# Patient Record
Sex: FEMALE | Race: WHITE | NOT HISPANIC OR LATINO | Employment: OTHER | URBAN - METROPOLITAN AREA
[De-identification: names, ages, dates, MRNs, and addresses within clinical notes are randomized per-mention and may not be internally consistent; named-entity substitution may affect disease eponyms.]

---

## 2023-08-14 ENCOUNTER — HOSPITAL ENCOUNTER (OUTPATIENT)
Dept: DATA CONVERSION | Facility: HOSPITAL | Age: 73
Discharge: HOME | End: 2023-08-14

## 2023-08-14 DIAGNOSIS — K76.9 LIVER DISEASE, UNSPECIFIED: ICD-10-CM

## 2023-09-08 PROBLEM — D75.89 MACROCYTOSIS WITHOUT ANEMIA: Status: ACTIVE | Noted: 2023-09-08

## 2023-09-08 PROBLEM — R00.2 PALPITATIONS: Status: ACTIVE | Noted: 2023-09-08

## 2023-09-08 PROBLEM — R74.8 ELEVATED LIVER ENZYMES: Status: ACTIVE | Noted: 2023-09-08

## 2023-09-08 PROBLEM — J44.9 CHRONIC OBSTRUCTIVE PULMONARY DISEASE (MULTI): Status: ACTIVE | Noted: 2023-09-08

## 2023-09-08 PROBLEM — N76.1 SUBACUTE AND CHRONIC VAGINITIS: Status: ACTIVE | Noted: 2023-09-08

## 2023-09-08 PROBLEM — R06.02 SHORTNESS OF BREATH: Status: ACTIVE | Noted: 2023-09-08

## 2023-09-08 PROBLEM — E78.5 HYPERLIPIDEMIA: Status: ACTIVE | Noted: 2023-09-08

## 2023-09-08 PROBLEM — I20.9 ANGINA PECTORIS (CMS-HCC): Status: ACTIVE | Noted: 2023-09-08

## 2023-09-08 RX ORDER — PRAVASTATIN SODIUM 80 MG/1
1 TABLET ORAL DAILY
COMMUNITY
Start: 2021-10-19 | End: 2023-12-26 | Stop reason: ALTCHOICE

## 2023-09-08 RX ORDER — POLYMYXIN B SULFATE AND TRIMETHOPRIM 1; 10000 MG/ML; [USP'U]/ML
SOLUTION OPHTHALMIC EVERY 6 HOURS
COMMUNITY
Start: 2022-12-02 | End: 2023-12-26 | Stop reason: ALTCHOICE

## 2023-09-08 RX ORDER — METOPROLOL SUCCINATE 50 MG/1
1 TABLET, EXTENDED RELEASE ORAL DAILY
COMMUNITY
Start: 2020-12-02 | End: 2024-01-23 | Stop reason: SDUPTHER

## 2023-09-08 RX ORDER — AMLODIPINE BESYLATE 5 MG/1
1 TABLET ORAL DAILY
COMMUNITY
End: 2024-02-09 | Stop reason: DRUGHIGH

## 2023-09-08 RX ORDER — ASPIRIN 81 MG/1
1 TABLET ORAL DAILY
COMMUNITY

## 2023-09-08 RX ORDER — CLOPIDOGREL BISULFATE 75 MG/1
1 TABLET ORAL DAILY
COMMUNITY
Start: 2022-08-30 | End: 2023-12-26 | Stop reason: ALTCHOICE

## 2023-09-08 RX ORDER — UMECLIDINIUM 62.5 UG/1
1 AEROSOL, POWDER ORAL DAILY
COMMUNITY
End: 2023-12-26 | Stop reason: ALTCHOICE

## 2023-09-08 RX ORDER — ISOSORBIDE DINITRATE 10 MG/1
1 TABLET ORAL 2 TIMES DAILY
COMMUNITY
End: 2023-12-26 | Stop reason: ALTCHOICE

## 2023-09-08 RX ORDER — ATORVASTATIN CALCIUM 40 MG/1
1 TABLET, FILM COATED ORAL DAILY
COMMUNITY
Start: 2023-02-02 | End: 2024-05-01 | Stop reason: SDUPTHER

## 2023-09-08 RX ORDER — ISOSORBIDE MONONITRATE 60 MG/1
TABLET, EXTENDED RELEASE ORAL
COMMUNITY
Start: 2020-12-17 | End: 2023-12-26 | Stop reason: ALTCHOICE

## 2023-09-08 RX ORDER — HYDROCHLOROTHIAZIDE 25 MG/1
1 TABLET ORAL EVERY MORNING
COMMUNITY
End: 2023-12-26 | Stop reason: ALTCHOICE

## 2023-09-15 ENCOUNTER — HOSPITAL ENCOUNTER (OUTPATIENT)
Dept: DATA CONVERSION | Facility: HOSPITAL | Age: 73
End: 2023-09-15

## 2023-09-15 ENCOUNTER — HOSPITAL ENCOUNTER (OUTPATIENT)
Dept: DATA CONVERSION | Facility: HOSPITAL | Age: 73
Discharge: HOME | End: 2023-09-15
Payer: COMMERCIAL

## 2023-09-15 DIAGNOSIS — F41.9 ANXIETY DISORDER, UNSPECIFIED: ICD-10-CM

## 2023-09-22 ENCOUNTER — HOSPITAL ENCOUNTER (OUTPATIENT)
Dept: DATA CONVERSION | Facility: HOSPITAL | Age: 73
Discharge: HOME | End: 2023-09-22
Payer: COMMERCIAL

## 2023-09-22 DIAGNOSIS — R16.0 HEPATOMEGALY, NOT ELSEWHERE CLASSIFIED: ICD-10-CM

## 2023-09-22 LAB
ALBUMIN SERPL-MCNC: 4.3 GM/DL (ref 3.5–5)
ALBUMIN/GLOB SERPL: 1.6 RATIO (ref 1.5–3)
ALP BLD-CCNC: 60 U/L (ref 35–125)
ALT SERPL-CCNC: 23 U/L (ref 5–40)
ANTICOAGULANT: NORMAL
AST SERPL-CCNC: 26 U/L (ref 5–40)
BILIRUB DIRECT SERPL-MCNC: 0.2 MG/DL (ref 0–0.2)
BILIRUB INDIRECT SERPL-MCNC: 0.3 MG/DL (ref 0–0.8)
BILIRUB SERPL-MCNC: 0.5 MG/DL (ref 0.1–1.2)
GLOBULIN SER-MCNC: 2.7 G/DL (ref 1.9–3.7)
INR PPP: 1 (ref 0.86–1.16)
PROT SERPL-MCNC: 7 G/DL (ref 5.9–7.9)
PROTHROMBIN TIME: 10.3 SEC (ref 9.3–12.7)

## 2023-09-23 LAB — AFP SERPL-MCNC: NORMAL NG/ML

## 2023-10-17 ENCOUNTER — APPOINTMENT (OUTPATIENT)
Dept: PRIMARY CARE | Facility: CLINIC | Age: 73
End: 2023-10-17
Payer: MEDICARE

## 2023-12-12 ENCOUNTER — TELEPHONE (OUTPATIENT)
Dept: PRIMARY CARE | Facility: CLINIC | Age: 73
End: 2023-12-12
Payer: COMMERCIAL

## 2023-12-12 DIAGNOSIS — J40 BRONCHITIS: Primary | ICD-10-CM

## 2023-12-12 RX ORDER — AZITHROMYCIN 250 MG/1
TABLET, FILM COATED ORAL
Qty: 6 TABLET | Refills: 0 | Status: SHIPPED | OUTPATIENT
Start: 2023-12-12 | End: 2023-12-17

## 2023-12-12 RX ORDER — BENZONATATE 100 MG/1
100 CAPSULE ORAL 3 TIMES DAILY PRN
Qty: 42 CAPSULE | Refills: 0 | Status: SHIPPED | OUTPATIENT
Start: 2023-12-12 | End: 2024-01-11

## 2023-12-12 NOTE — TELEPHONE ENCOUNTER
PATIENT CALLED C/O MOIST COUGH X 1 WEEK. HAS TREATED WITH ROBITUSSIN NOT HELPING. COVID NEGATIVE, NO FEVER. REQUESTING SOMETHING FOR COUGH

## 2023-12-18 ENCOUNTER — TELEPHONE (OUTPATIENT)
Dept: PRIMARY CARE | Facility: CLINIC | Age: 73
End: 2023-12-18
Payer: COMMERCIAL

## 2023-12-18 NOTE — TELEPHONE ENCOUNTER
"Patient states that tessalon perles have not helped. Requesting \"something different\". Please advise.  "

## 2023-12-26 ENCOUNTER — OFFICE VISIT (OUTPATIENT)
Dept: PRIMARY CARE | Facility: CLINIC | Age: 73
End: 2023-12-26
Payer: MEDICARE

## 2023-12-26 VITALS
SYSTOLIC BLOOD PRESSURE: 121 MMHG | BODY MASS INDEX: 32.33 KG/M2 | HEIGHT: 67 IN | HEART RATE: 82 BPM | OXYGEN SATURATION: 93 % | TEMPERATURE: 97.8 F | WEIGHT: 206 LBS | DIASTOLIC BLOOD PRESSURE: 73 MMHG

## 2023-12-26 DIAGNOSIS — J40 BRONCHITIS: Primary | ICD-10-CM

## 2023-12-26 PROBLEM — F41.9 ANXIETY: Status: ACTIVE | Noted: 2023-12-26

## 2023-12-26 PROBLEM — K76.9 LESION OF LIVER: Status: ACTIVE | Noted: 2023-12-26

## 2023-12-26 PROCEDURE — 1036F TOBACCO NON-USER: CPT | Performed by: LICENSED PRACTICAL NURSE

## 2023-12-26 PROCEDURE — 99213 OFFICE O/P EST LOW 20 MIN: CPT | Performed by: LICENSED PRACTICAL NURSE

## 2023-12-26 PROCEDURE — 1126F AMNT PAIN NOTED NONE PRSNT: CPT | Performed by: LICENSED PRACTICAL NURSE

## 2023-12-26 PROCEDURE — 1159F MED LIST DOCD IN RCRD: CPT | Performed by: LICENSED PRACTICAL NURSE

## 2023-12-26 PROCEDURE — 1160F RVW MEDS BY RX/DR IN RCRD: CPT | Performed by: LICENSED PRACTICAL NURSE

## 2023-12-26 RX ORDER — UMECLIDINIUM 62.5 UG/1
AEROSOL, POWDER ORAL
COMMUNITY

## 2023-12-26 RX ORDER — DOXYCYCLINE 100 MG/1
100 CAPSULE ORAL 2 TIMES DAILY
Qty: 14 CAPSULE | Refills: 0 | Status: SHIPPED | OUTPATIENT
Start: 2023-12-26 | End: 2024-01-02

## 2023-12-26 RX ORDER — METHYLPREDNISOLONE 4 MG/1
TABLET ORAL
Qty: 21 TABLET | Refills: 0 | Status: SHIPPED | OUTPATIENT
Start: 2023-12-26 | End: 2024-01-02

## 2023-12-26 RX ORDER — ALBUTEROL SULFATE 90 UG/1
2 AEROSOL, METERED RESPIRATORY (INHALATION) EVERY 6 HOURS PRN
COMMUNITY

## 2023-12-26 ASSESSMENT — ENCOUNTER SYMPTOMS
OCCASIONAL FEELINGS OF UNSTEADINESS: 0
COUGH: 1
LOSS OF SENSATION IN FEET: 0
SORE THROAT: 0
RHINORRHEA: 0
SINUS PRESSURE: 0
DEPRESSION: 0
SHORTNESS OF BREATH: 1

## 2023-12-26 ASSESSMENT — PAIN SCALES - GENERAL: PAINLEVEL: 0-NO PAIN

## 2023-12-26 ASSESSMENT — PATIENT HEALTH QUESTIONNAIRE - PHQ9
SUM OF ALL RESPONSES TO PHQ9 QUESTIONS 1 AND 2: 0
2. FEELING DOWN, DEPRESSED OR HOPELESS: NOT AT ALL
1. LITTLE INTEREST OR PLEASURE IN DOING THINGS: NOT AT ALL

## 2023-12-26 NOTE — PATIENT INSTRUCTIONS
It was nice meeting you today Mrs. Castellano. I'm sorry you have having difficulty with this cough. I will prescribe you a different antibiotic and a steroid that should help you some. If you notice your symptoms are not improving or worsening please contact your pulmonologist. If you feel sever shortness call 911.

## 2023-12-26 NOTE — PROGRESS NOTES
[facility]: [facility]  PROGRESS NOTE      Elsa Castellano is a 73 y.o. female that is presenting today for Cough (X 2 weeks).      Subjective   Pt presents to the office today for concerns of a cough that has not seemed to go away. She has had the cough for approximately 1 month. She reports a productive cough with clear mucous. She reports the cough is not worsening, but continues to affect her breathing. She tried a Zpak and tessalon perles last week and noticed no improvement in her symptoms. She denies nasal congestion, ear concerns, fever, chills.     Cough  Associated symptoms include shortness of breath. Pertinent negatives include no postnasal drip, rhinorrhea or sore throat.     Review of Systems   HENT:  Negative for postnasal drip, rhinorrhea, sinus pressure and sore throat.    Respiratory:  Positive for cough and shortness of breath.       Objective   Vitals:    12/26/23 0922   BP: 121/73   Pulse: 82   Temp: 36.6 °C (97.8 °F)   SpO2: 93%      Body mass index is 32.02 kg/m².  Physical Exam  Constitutional:       General: She is not in acute distress.     Appearance: She is not ill-appearing or toxic-appearing.   Cardiovascular:      Rate and Rhythm: Normal rate and regular rhythm.   Pulmonary:      Effort: Pulmonary effort is normal.      Breath sounds: Examination of the right-upper field reveals wheezing. Examination of the left-upper field reveals wheezing. Examination of the right-middle field reveals wheezing. Examination of the left-middle field reveals wheezing. Wheezing present.       Diagnostic Results   Lab Results   Component Value Date    GLUCOSE 100 (H) 08/05/2023    CALCIUM 9.0 08/05/2023     08/05/2023    K 4.8 08/05/2023    CO2 20 (L) 08/05/2023     (H) 08/05/2023    BUN 16 08/05/2023    CREATININE 0.8 08/05/2023     Lab Results   Component Value Date    ALT 23 09/22/2023    AST 26 09/22/2023    ALKPHOS 60 09/22/2023    BILITOT 0.5 09/22/2023     Lab  "Results   Component Value Date    WBC 6.7 08/05/2023    HGB 13.8 08/05/2023    HCT 43.4 08/05/2023    MCV 98.2 08/05/2023     08/05/2023     Lab Results   Component Value Date    CHOL 158 08/05/2023    CHOL 216 (H) 01/27/2023    CHOL 200 07/27/2022     Lab Results   Component Value Date    HDL 57 08/05/2023    HDL 60 01/27/2023    HDL 62 07/27/2022     Lab Results   Component Value Date    LDLCALC 79 08/05/2023    LDLCALC 120 01/27/2023    LDLCALC 101 07/27/2022     Lab Results   Component Value Date    TRIG 112 08/05/2023    TRIG 181 (H) 01/27/2023    TRIG 186 (H) 07/27/2022     No components found for: \"CHOLHDL\"  Lab Results   Component Value Date    HGBA1C 5.6 01/27/2023     Other labs not included in the list above were reviewed either before or during this encounter.    History    Past Medical History:   Diagnosis Date    Personal history of other diseases of the circulatory system     History of hypertension    Personal history of other diseases of the respiratory system     History of chronic obstructive lung disease     History reviewed. No pertinent surgical history.  Family History   Problem Relation Name Age of Onset    Stroke Father      No Known Problems Brother       Social History     Socioeconomic History    Marital status:      Spouse name: Not on file    Number of children: Not on file    Years of education: Not on file    Highest education level: Not on file   Occupational History    Not on file   Tobacco Use    Smoking status: Former     Types: Cigarettes     Passive exposure: Past    Smokeless tobacco: Never   Vaping Use    Vaping Use: Never used   Substance and Sexual Activity    Alcohol use: Yes    Drug use: Never    Sexual activity: Not on file   Other Topics Concern    Not on file   Social History Narrative    Not on file     Social Determinants of Health     Financial Resource Strain: Not on file   Food Insecurity: Not on file   Transportation Needs: Not on file   Physical " Activity: Not on file   Stress: Not on file   Social Connections: Not on file   Intimate Partner Violence: Not on file   Housing Stability: Not on file     Allergies   Allergen Reactions    Hydrochlorothiazide Dizziness    Penicillins Unknown     Current Outpatient Medications on File Prior to Visit   Medication Sig Dispense Refill    albuterol (Proventil HFA) 90 mcg/actuation inhaler Inhale 2 puffs every 6 hours if needed for wheezing.      amLODIPine (Norvasc) 5 mg tablet Take 1 tablet (5 mg) by mouth once daily.      aspirin (Adult Low Dose Aspirin) 81 mg EC tablet Take 1 tablet (81 mg) by mouth once daily.      atorvastatin (Lipitor) 40 mg tablet Take 1 tablet (40 mg) by mouth once daily.      benzonatate (Tessalon) 100 mg capsule Take 1 capsule (100 mg) by mouth 3 times a day as needed for cough. Do not crush or chew. 42 capsule 0    metoprolol succinate XL (Toprol-XL) 50 mg 24 hr tablet Take 1 tablet (50 mg) by mouth once daily.      umeclidinium (Incruse Ellipta) 62.5 mcg/actuation inhalation Inhale.      [DISCONTINUED] albuterol 108 (90 Base) MCG/ACT inhaler every 4 hours.      [DISCONTINUED] umeclidinium (Incruse Ellipta) 62.5 mcg/actuation inhalation Inhale 1 puff (62.5 mcg) once daily.      [DISCONTINUED] carbamide peroxide (Debrox) 6.5 % otic solution Administer into affected ear(s).      [DISCONTINUED] clopidogrel (Plavix) 75 mg tablet Take 1 tablet (75 mg) by mouth once daily.      [DISCONTINUED] hydroCHLOROthiazide (HYDRODiuril) 25 mg tablet Take 1 tablet (25 mg) by mouth once daily in the morning.      [DISCONTINUED] isosorbide dinitrate (Isordil) 10 mg tablet Take 1 tablet (10 mg) by mouth 2 times a day.      [DISCONTINUED] isosorbide mononitrate ER (Imdur) 60 mg 24 hr tablet Take by mouth.      [DISCONTINUED] polymyxin B sulf-trimethoprim (Polytrim) ophthalmic solution every 6 hours.      [DISCONTINUED] pravastatin (Pravachol) 80 mg tablet Take 1 tablet (80 mg) by mouth once daily.       No current  facility-administered medications on file prior to visit.     Immunization History   Administered Date(s) Administered    DTaP vaccine, pediatric  (INFANRIX) 08/24/2007    Flu vaccine, quadrivalent, high-dose, preservative free, age 65y+ (FLUZONE) 09/23/2020, 09/28/2021, 10/19/2022    Influenza, High Dose Seasonal, Preservative Free 11/18/2015, 10/12/2018, 12/16/2019    Influenza, injectable, quadrivalent 10/09/2014, 11/12/2019, 10/12/2020    Influenza, seasonal, injectable 10/15/2010, 10/28/2011, 11/30/2012, 01/03/2014, 10/05/2015, 10/17/2016    Moderna SARS-CoV-2 Vaccination 02/02/2021, 03/01/2021, 10/28/2021    PPD Test 08/26/2011    Pneumococcal conjugate vaccine, 13-valent (PREVNAR 13) 01/16/2015    Pneumococcal conjugate vaccine, 20-valent (PREVNAR 20) 07/27/2022    Pneumococcal polysaccharide vaccine, 23-valent, age 2 years and older (PNEUMOVAX 23) 12/16/2019    Tdap vaccine, age 7 year and older (BOOSTRIX) 01/19/2022    Zoster, live 02/04/2010     Patient's medical history was reviewed and updated either before or during this encounter.       Assessment/Plan   Problem List Items Addressed This Visit    None  Visit Diagnoses       Bronchitis    -  Primary    Relevant Medications    doxycycline (Vibramycin) 100 mg capsule    methylPREDNISolone (Medrol Dospak) 4 mg tablets            Ammon Miller, APRN-CNP

## 2023-12-30 PROBLEM — H10.022 OTHER MUCOPURULENT CONJUNCTIVITIS, LEFT EYE: Status: ACTIVE | Noted: 2023-12-30

## 2023-12-30 PROBLEM — E66.9 OBESITY WITH BODY MASS INDEX 30 OR GREATER: Status: ACTIVE | Noted: 2023-12-30

## 2023-12-30 PROBLEM — R07.9 CHEST PAIN: Status: ACTIVE | Noted: 2023-09-08

## 2023-12-30 PROBLEM — I21.4 ACUTE NON-ST SEGMENT ELEVATION MYOCARDIAL INFARCTION (MULTI): Status: ACTIVE | Noted: 2022-08-28

## 2023-12-30 PROBLEM — R06.89 NOISY RESPIRATION: Status: ACTIVE | Noted: 2023-12-30

## 2023-12-30 PROBLEM — H61.20 IMPACTED CERUMEN: Status: ACTIVE | Noted: 2023-12-30

## 2023-12-30 PROBLEM — R73.03 PREDIABETES: Status: ACTIVE | Noted: 2023-01-27

## 2023-12-30 PROBLEM — R16.0 HEPATOMEGALY: Status: ACTIVE | Noted: 2023-08-14

## 2023-12-30 PROBLEM — Z86.79 HISTORY OF HYPERTENSION: Status: ACTIVE | Noted: 2023-12-30

## 2023-12-30 PROBLEM — J31.0 RHINITIS: Status: ACTIVE | Noted: 2023-12-30

## 2023-12-30 PROBLEM — R05.9 COUGH: Status: ACTIVE | Noted: 2022-09-28

## 2024-01-23 ENCOUNTER — OFFICE VISIT (OUTPATIENT)
Dept: CARDIOLOGY | Facility: CLINIC | Age: 74
End: 2024-01-23
Payer: MEDICARE

## 2024-01-23 VITALS
DIASTOLIC BLOOD PRESSURE: 76 MMHG | WEIGHT: 203 LBS | RESPIRATION RATE: 18 BRPM | HEART RATE: 79 BPM | SYSTOLIC BLOOD PRESSURE: 140 MMHG | TEMPERATURE: 98.6 F | BODY MASS INDEX: 31.86 KG/M2 | HEIGHT: 67 IN

## 2024-01-23 DIAGNOSIS — E78.2 MIXED HYPERLIPIDEMIA: ICD-10-CM

## 2024-01-23 DIAGNOSIS — R07.2 PRECORDIAL PAIN: ICD-10-CM

## 2024-01-23 DIAGNOSIS — R00.2 PALPITATIONS: ICD-10-CM

## 2024-01-23 DIAGNOSIS — Z86.79 HISTORY OF HYPERTENSION: ICD-10-CM

## 2024-01-23 DIAGNOSIS — R06.02 SHORTNESS OF BREATH: Primary | ICD-10-CM

## 2024-01-23 PROCEDURE — 93000 ELECTROCARDIOGRAM COMPLETE: CPT | Performed by: INTERNAL MEDICINE

## 2024-01-23 PROCEDURE — 99214 OFFICE O/P EST MOD 30 MIN: CPT | Performed by: INTERNAL MEDICINE

## 2024-01-23 PROCEDURE — 1126F AMNT PAIN NOTED NONE PRSNT: CPT | Performed by: INTERNAL MEDICINE

## 2024-01-23 PROCEDURE — 1036F TOBACCO NON-USER: CPT | Performed by: INTERNAL MEDICINE

## 2024-01-23 PROCEDURE — 1159F MED LIST DOCD IN RCRD: CPT | Performed by: INTERNAL MEDICINE

## 2024-01-23 RX ORDER — METOPROLOL SUCCINATE 50 MG/1
50 TABLET, EXTENDED RELEASE ORAL DAILY
Qty: 90 TABLET | Refills: 3 | Status: SHIPPED | OUTPATIENT
Start: 2024-01-23 | End: 2025-01-22

## 2024-01-23 ASSESSMENT — LIFESTYLE VARIABLES
AUDIT TOTAL SCORE: 4
HOW OFTEN DURING THE LAST YEAR HAVE YOU BEEN UNABLE TO REMEMBER WHAT HAPPENED THE NIGHT BEFORE BECAUSE YOU HAD BEEN DRINKING: NEVER
HOW OFTEN DO YOU HAVE SIX OR MORE DRINKS ON ONE OCCASION: NEVER
AUDIT-C TOTAL SCORE: 4
HAS A RELATIVE, FRIEND, DOCTOR, OR ANOTHER HEALTH PROFESSIONAL EXPRESSED CONCERN ABOUT YOUR DRINKING OR SUGGESTED YOU CUT DOWN: NO
HOW MANY STANDARD DRINKS CONTAINING ALCOHOL DO YOU HAVE ON A TYPICAL DAY: 1 OR 2
HOW OFTEN DURING THE LAST YEAR HAVE YOU FAILED TO DO WHAT WAS NORMALLY EXPECTED FROM YOU BECAUSE OF DRINKING: NEVER
HOW OFTEN DO YOU HAVE A DRINK CONTAINING ALCOHOL: 4 OR MORE TIMES A WEEK
HOW OFTEN DURING THE LAST YEAR HAVE YOU NEEDED AN ALCOHOLIC DRINK FIRST THING IN THE MORNING TO GET YOURSELF GOING AFTER A NIGHT OF HEAVY DRINKING: NEVER
HOW OFTEN DURING THE LAST YEAR HAVE YOU FOUND THAT YOU WERE NOT ABLE TO STOP DRINKING ONCE YOU HAD STARTED: NEVER
HAVE YOU OR SOMEONE ELSE BEEN INJURED AS A RESULT OF YOUR DRINKING: NO
HOW OFTEN DURING THE LAST YEAR HAVE YOU HAD A FEELING OF GUILT OR REMORSE AFTER DRINKING: NEVER
SKIP TO QUESTIONS 9-10: 1

## 2024-01-23 ASSESSMENT — PATIENT HEALTH QUESTIONNAIRE - PHQ9
SUM OF ALL RESPONSES TO PHQ9 QUESTIONS 1 AND 2: 0
1. LITTLE INTEREST OR PLEASURE IN DOING THINGS: NOT AT ALL
2. FEELING DOWN, DEPRESSED OR HOPELESS: NOT AT ALL

## 2024-01-23 ASSESSMENT — PAIN SCALES - GENERAL: PAINLEVEL: 0-NO PAIN

## 2024-01-23 NOTE — PROGRESS NOTES
History of present illness:  This is a very pleasant 73-year-old female returning to my office after recent hospitalization for non STEMI. Cardiac catheterization showed stable moderate LAD disease with negative FFR. Echo in 2021 was ok. Patient feels overall good. Patient denies having having chest pain or shortness of breath. No palpitations or dizziness.  Patient doing overall good.  Denies any chest pain or shortness of breath.    Past Medical History:   Diagnosis Date    Acute non-ST segment elevation myocardial infarction (CMS/[facility]) 08/28/2022    Chest pain 09/08/2023    Chronic obstructive pulmonary disease (CMS/HCC) 09/08/2023    History of hypertension 12/30/2023    Hyperlipidemia 09/08/2023    Palpitations 09/08/2023    Personal history of other diseases of the circulatory system     History of hypertension    Personal history of other diseases of the respiratory system     History of chronic obstructive lung disease    Prediabetes 01/27/2023    Shortness of breath 09/08/2023       Past Surgical History:   Procedure Laterality Date    CARDIAC CATHETERIZATION      x2       Allergies   Allergen Reactions    Penicillins Unknown, Anaphylaxis and Swelling    Hydrochlorothiazide Dizziness        reports that she has quit smoking. Her smoking use included cigarettes. She has been exposed to tobacco smoke. She has never used smokeless tobacco. She reports current alcohol use of about 10.0 standard drinks of alcohol per week. She reports that she does not use drugs.    Family History   Problem Relation Name Age of Onset    Stroke Father      Heart attack Father      No Known Problems Brother         Patient's Medications   New Prescriptions    No medications on file   Previous Medications    ALBUTEROL (PROVENTIL HFA) 90 MCG/ACTUATION INHALER    Inhale 2 puffs every 6 hours if needed for wheezing.    AMLODIPINE (NORVASC) 5 MG TABLET    Take 1 tablet (5 mg) by mouth once daily.    ASPIRIN (ADULT LOW DOSE ASPIRIN) 81  MG EC TABLET    Take 1 tablet (81 mg) by mouth once daily.    ATORVASTATIN (LIPITOR) 40 MG TABLET    Take 1 tablet (40 mg) by mouth once daily.    METOPROLOL SUCCINATE XL (TOPROL-XL) 50 MG 24 HR TABLET    Take 1 tablet (50 mg) by mouth once daily.    UMECLIDINIUM (INCRUSE ELLIPTA) 62.5 MCG/ACTUATION INHALATION    Inhale.   Modified Medications    No medications on file   Discontinued Medications    FLUTICASONE PROPIONATE 93 MCG/ACTUATION AEROSOL BREATH ACTIVATED    Administer 2 puffs into affected nostril(s) once daily.       Objective   Physical Exam  General: Patient in no acute distress   HEENT: Atraumatic normocephalic.  Neck: Supple, jugular venous pressure within normal limit.  No bruits  Lungs: Clear to auscultation bilaterally  Cardiovascular: Regular rate and rhythm, normal heart sounds, no murmurs rubs or gallops  Abdomen: Soft nontender nondistended.  Normal bowel sounds.  Extremities: Warm to touch, no edema.    Lab Review   No visits with results within 2 Month(s) from this visit.   Latest known visit with results is:   Hospital Outpatient Visit on 09/22/2023   Component Date Value    ALPHA-1 FETOPROTEIN (NG/* 09/22/2023                      Value:6  Reference range: 0  to  9  Unit: ng/mL    AFP testing is performed by chemiluminescent immunoassay   using the Siemens Atellica. Values obtained with   different analyte methods cannot be used interchangeably.   This test can be used as an adjunct in the diagnosis and   monitoring of AFP-producing tumors, including non-seminomatous   germ cell tumors and hepatocellular carcinomas.   Test performed at:  [facility] [address] [facility] [id number]      AST 09/22/2023 26     ALT (SGPT) 09/22/2023 23     Alkaline Phosphatase 09/22/2023 60     Total Bilirubin 09/22/2023 0.5     Bilirubin, Direct 09/22/2023 0.2     Bilirubin Indirect 09/22/2023 0.3     Total Protein 09/22/2023 7.0     Albumin 09/22/2023 4.3     Globulin, Total 09/22/2023 2.7     A/G Ratio 09/22/2023  1.6     Anticoagulant 09/22/2023                      Value:ASPIRIN  Performed at [facility],[address] [place] [address]      Protime 09/22/2023 10.3     INR 09/22/2023 1.0         Assessment/Plan   Patient Active Problem List   Diagnosis    Chest pain    Chronic obstructive pulmonary disease (CMS/HCC)    Elevated liver enzymes    Macrocytosis without anemia    Hyperlipidemia    Palpitations    Shortness of breath    Subacute and chronic vaginitis    Anxiety    Hepatomegaly    Other mucopurulent conjunctivitis, left eye    Prediabetes    Cough    History of hypertension    Impacted cerumen    Noisy respiration    Obesity with body mass index 30 or greater    Rhinitis    Acute non-ST segment elevation myocardial infarction (CMS/HCC)      This is a very pleasant 73-year-old female returning to my office after recent hospitalization for non STEMI. Cardiac catheterization showed stable moderate LAD disease with negative FFR. Echo in 2021 was ok. Patient feels overall good. Patient denies having having chest pain or shortness of breath. No palpitations or dizziness.  Patient doing overall good.  Denies any chest pain or shortness of breath.  At this point my recommendation is to do well continue current medications.  We will track her blood pressure if it remains elevated will increase amlodipine to 10 mg oral daily.  Otherwise continue same medications LDL of 71.  Will follow-up in 1 year.  EKG today showed normal sinus rhythm nonspecific ST-T wave abnormalities     Ady Bedolla MD

## 2024-02-08 PROBLEM — R05.9 COUGH: Status: RESOLVED | Noted: 2022-09-28 | Resolved: 2024-02-08

## 2024-02-08 PROBLEM — R06.89 NOISY RESPIRATION: Status: RESOLVED | Noted: 2023-12-30 | Resolved: 2024-02-08

## 2024-02-08 PROBLEM — H10.022 OTHER MUCOPURULENT CONJUNCTIVITIS, LEFT EYE: Status: RESOLVED | Noted: 2023-12-30 | Resolved: 2024-02-08

## 2024-02-08 PROBLEM — R06.02 SHORTNESS OF BREATH: Status: RESOLVED | Noted: 2023-09-08 | Resolved: 2024-02-08

## 2024-02-08 PROBLEM — R00.2 PALPITATIONS: Status: RESOLVED | Noted: 2023-09-08 | Resolved: 2024-02-08

## 2024-02-08 PROBLEM — I21.4 ACUTE NON-ST SEGMENT ELEVATION MYOCARDIAL INFARCTION (MULTI): Status: RESOLVED | Noted: 2022-08-28 | Resolved: 2024-02-08

## 2024-02-08 PROBLEM — H61.20 IMPACTED CERUMEN: Status: RESOLVED | Noted: 2023-12-30 | Resolved: 2024-02-08

## 2024-02-08 PROBLEM — N76.1 SUBACUTE AND CHRONIC VAGINITIS: Status: RESOLVED | Noted: 2023-09-08 | Resolved: 2024-02-08

## 2024-02-08 PROBLEM — R07.9 CHEST PAIN: Status: RESOLVED | Noted: 2023-09-08 | Resolved: 2024-02-08

## 2024-02-08 NOTE — PROGRESS NOTES
[facility]: [facility]  PROGRESS NOTE      Elsa Castellano is a 73 y.o. female that is presenting today for Follow-up (Pt states that her rt eye feels itchy. Pt states that its been going since this morning ).    Assessment/Plan   Diagnoses and all orders for this visit:  Mixed hyperlipidemia  Chronic obstructive pulmonary disease, unspecified COPD type (CMS/HCC)  Anxiety  Prediabetes  Primary hypertension  Coronary artery disease involving native coronary artery of native heart without angina pectoris  Liver mass  Other screening mammogram  -     BI mammo bilateral screening tomosynthesis; Future  Acute conjunctivitis of right eye, unspecified acute conjunctivitis type  -     tobramycin (Tobrex) 0.3 % ophthalmic solution; Administer 1 drop into the right eye every 4 hours for 7 days.  Encounter for routine laboratory testing  We reviewed her overall situation.  In terms of her liver lesion she will go and see Dr. Alexandra I placed a referral for such.  In terms of her cholesterol she will do blood work today.  Terms of her COPD she will keep her follow-up with Dr. Huerta and she seems to be clinically doing well.  Terms of her coronary artery disease she seems to be doing very well and also keeps follow-up with Dr. Bedolla.  In terms of her prediabetes we will recheck her labs today.  In terms of her blood pressure she is not under good control we will increase her amlodipine from 5 to 10 mg/day.  Her weight could be a factor with this as well.  We are both not pleased that she is gaining weight and we will refer her to dietitian in that regard.  We also will explore whether she could be covered for one of the GLP-1 inhibitors.    I will plan on seeing her back next month.  Labs are ordered today as well.  Subjective   She is here for follow-up today for her multiple medical problems as noted.  She really does not feel too poorly but she is concerned that she continues to gain weight despite the  fact that she still is active and exercising and is engaged in weight watchers.  We also discussed the fact that we had referred her to see Dr. Byrd in the fall because of a lesion in her liver that we think is just a hepatic adenoma.  She was not able to keep the appointment because she had COVID and has not yet rescheduled such.      Review of Systems   Respiratory: Negative.     Cardiovascular: Negative.    Gastrointestinal: Negative.    Endocrine: Negative.    Genitourinary: Negative.       Objective   Vitals:    02/09/24 0858   BP: (!) 130/98   Pulse: 73   Temp: 36.2 °C (97.2 °F)   SpO2: 99%      Body mass index is 32.8 kg/m².  Physical Exam  Constitutional:       Appearance: Normal appearance. She is obese.   HENT:      Head: Normocephalic and atraumatic.   Cardiovascular:      Rate and Rhythm: Normal rate and regular rhythm.      Pulses: Normal pulses.      Heart sounds: Normal heart sounds.   Pulmonary:      Effort: Pulmonary effort is normal.      Breath sounds: Normal breath sounds.   Abdominal:      General: Abdomen is flat. Bowel sounds are normal.      Palpations: Abdomen is soft.   Musculoskeletal:         General: Normal range of motion.      Cervical back: Normal range of motion.   Skin:     General: Skin is warm and dry.   Neurological:      General: No focal deficit present.      Mental Status: She is alert.   Psychiatric:         Mood and Affect: Mood normal.       Diagnostic Results   Lab Results   Component Value Date    GLUCOSE 100 (H) 08/05/2023    CALCIUM 9.0 08/05/2023     08/05/2023    K 4.8 08/05/2023    CO2 20 (L) 08/05/2023     (H) 08/05/2023    BUN 16 08/05/2023    CREATININE 0.8 08/05/2023     Lab Results   Component Value Date    ALT 23 09/22/2023    AST 26 09/22/2023    ALKPHOS 60 09/22/2023    BILITOT 0.5 09/22/2023     Lab Results   Component Value Date    WBC 6.7 08/05/2023    HGB 13.8 08/05/2023    HCT 43.4 08/05/2023    MCV 98.2 08/05/2023     08/05/2023  "    Lab Results   Component Value Date    CHOL 158 08/05/2023    CHOL 216 (H) 01/27/2023    CHOL 200 07/27/2022     Lab Results   Component Value Date    HDL 57 08/05/2023    HDL 60 01/27/2023    HDL 62 07/27/2022     Lab Results   Component Value Date    LDLCALC 79 08/05/2023    LDLCALC 120 01/27/2023    LDLCALC 101 07/27/2022     Lab Results   Component Value Date    TRIG 112 08/05/2023    TRIG 181 (H) 01/27/2023    TRIG 186 (H) 07/27/2022     No components found for: \"CHOLHDL\"  Lab Results   Component Value Date    HGBA1C 5.6 01/27/2023     Other labs not included in the list above were reviewed either before or during this encounter.    History    Past Medical History:   Diagnosis Date    Acute non-ST segment elevation myocardial infarction (CMS/HCC) 08/28/2022    Anxiety 12/26/2023    Chest pain 09/08/2023    Chronic obstructive pulmonary disease (CMS/HCC) 09/08/2023    Elevated liver enzymes 09/08/2023    Hepatomegaly 08/14/2023    History of hypertension 12/30/2023    Hyperlipidemia 09/08/2023    Macrocytosis without anemia 09/08/2023    Palpitations 09/08/2023    Personal history of other diseases of the circulatory system     History of hypertension    Personal history of other diseases of the respiratory system     History of chronic obstructive lung disease    Prediabetes 01/27/2023    Rhinitis 12/30/2023    Shortness of breath 09/08/2023     Past Surgical History:   Procedure Laterality Date    CARDIAC CATHETERIZATION  2022    x2    CATARACT EXTRACTION Bilateral 2014    COLONOSCOPY  2008    COLONOSCOPY  2013    COLONOSCOPY  2018    DILATION AND CURETTAGE OF UTERUS  2012    VAGINA SURGERY  1974     Family History   Problem Relation Name Age of Onset    Stroke Father      Heart attack Father      No Known Problems Brother       Social History     Socioeconomic History    Marital status:      Spouse name: Not on file    Number of children: Not on file    Years of education: Not on file    Highest " education level: Not on file   Occupational History    Not on file   Tobacco Use    Smoking status: Former     Types: Cigarettes     Passive exposure: Past    Smokeless tobacco: Never   Vaping Use    Vaping Use: Never used   Substance and Sexual Activity    Alcohol use: Yes     Alcohol/week: 10.0 standard drinks of alcohol     Types: 10 Standard drinks or equivalent per week    Drug use: Never    Sexual activity: Defer   Other Topics Concern    Not on file   Social History Narrative    Not on file     Social Determinants of Health     Financial Resource Strain: Not on file   Food Insecurity: Not on file   Transportation Needs: Not on file   Physical Activity: Not on file   Stress: Not on file   Social Connections: Not on file   Intimate Partner Violence: Not on file   Housing Stability: Not on file     Allergies   Allergen Reactions    Penicillins Unknown, Anaphylaxis and Swelling    Hydrochlorothiazide Dizziness     Current Outpatient Medications on File Prior to Visit   Medication Sig Dispense Refill    albuterol (Proventil HFA) 90 mcg/actuation inhaler Inhale 2 puffs every 6 hours if needed for wheezing.      aspirin (Adult Low Dose Aspirin) 81 mg EC tablet Take 1 tablet (81 mg) by mouth once daily.      atorvastatin (Lipitor) 40 mg tablet Take 1 tablet (40 mg) by mouth once daily.      metoprolol succinate XL (Toprol-XL) 50 mg 24 hr tablet Take 1 tablet (50 mg) by mouth once daily. 90 tablet 3    umeclidinium (Incruse Ellipta) 62.5 mcg/actuation inhalation Inhale.      [DISCONTINUED] amLODIPine (Norvasc) 5 mg tablet Take 1 tablet (5 mg) by mouth once daily.       No current facility-administered medications on file prior to visit.     Immunization History   Administered Date(s) Administered    DTaP vaccine, pediatric  (INFANRIX) 08/24/2007    Flu vaccine, quadrivalent, high-dose, preservative free, age 65y+ (FLUZONE) 09/23/2020, 09/28/2021, 10/19/2022    Influenza, High Dose Seasonal, Preservative Free  11/18/2015, 10/12/2018, 12/16/2019    Influenza, Seasonal, Quadrivalent, Adjuvanted 10/24/2023    Influenza, injectable, quadrivalent 10/09/2014, 11/12/2019, 10/12/2020    Influenza, seasonal, injectable 10/15/2010, 10/28/2011, 11/30/2012, 01/03/2014, 10/05/2015, 10/17/2016    Moderna COVID-19 vaccine, Fall 2023, 12 yeasrs and older (50mcg/0.5mL) 01/31/2024    Moderna COVID-19 vaccine, bivalent, blue cap/gray label *Check age/dose* 10/03/2022    Moderna SARS-CoV-2 Booster 01/31/2024    Moderna SARS-CoV-2 Vaccination 02/02/2021, 03/01/2021, 10/28/2021    Novel influenza-H1N1-09, preservative-free 01/14/2010    PPD Test 08/26/2011    Pneumococcal conjugate vaccine, 13-valent (PREVNAR 13) 01/16/2015    Pneumococcal conjugate vaccine, 20-valent (PREVNAR 20) 07/27/2022    Pneumococcal polysaccharide vaccine, 23-valent, age 2 years and older (PNEUMOVAX 23) 12/16/2019    Tdap vaccine, age 7 year and older (BOOSTRIX, ADACEL) 01/19/2022    Zoster, live 02/04/2010     Patient's medical history was reviewed and updated either before or during this encounter.       Gilbert Velazco MD

## 2024-02-09 ENCOUNTER — LAB (OUTPATIENT)
Dept: LAB | Facility: LAB | Age: 74
End: 2024-02-09
Payer: MEDICARE

## 2024-02-09 ENCOUNTER — OFFICE VISIT (OUTPATIENT)
Dept: PRIMARY CARE | Facility: CLINIC | Age: 74
End: 2024-02-09
Payer: MEDICARE

## 2024-02-09 VITALS
TEMPERATURE: 97.2 F | WEIGHT: 211 LBS | HEART RATE: 73 BPM | OXYGEN SATURATION: 99 % | DIASTOLIC BLOOD PRESSURE: 98 MMHG | SYSTOLIC BLOOD PRESSURE: 130 MMHG | BODY MASS INDEX: 32.8 KG/M2

## 2024-02-09 DIAGNOSIS — Z12.31 OTHER SCREENING MAMMOGRAM: ICD-10-CM

## 2024-02-09 DIAGNOSIS — H10.31 ACUTE CONJUNCTIVITIS OF RIGHT EYE, UNSPECIFIED ACUTE CONJUNCTIVITIS TYPE: ICD-10-CM

## 2024-02-09 DIAGNOSIS — R16.0 LIVER MASS: ICD-10-CM

## 2024-02-09 DIAGNOSIS — E78.2 MIXED HYPERLIPIDEMIA: ICD-10-CM

## 2024-02-09 DIAGNOSIS — Z01.89 ENCOUNTER FOR ROUTINE LABORATORY TESTING: ICD-10-CM

## 2024-02-09 DIAGNOSIS — E78.2 MIXED HYPERLIPIDEMIA: Primary | ICD-10-CM

## 2024-02-09 DIAGNOSIS — J44.9 CHRONIC OBSTRUCTIVE PULMONARY DISEASE, UNSPECIFIED COPD TYPE (MULTI): ICD-10-CM

## 2024-02-09 DIAGNOSIS — I10 PRIMARY HYPERTENSION: ICD-10-CM

## 2024-02-09 DIAGNOSIS — R73.03 PREDIABETES: ICD-10-CM

## 2024-02-09 DIAGNOSIS — F41.9 ANXIETY: ICD-10-CM

## 2024-02-09 DIAGNOSIS — I25.10 CORONARY ARTERY DISEASE INVOLVING NATIVE CORONARY ARTERY OF NATIVE HEART WITHOUT ANGINA PECTORIS: ICD-10-CM

## 2024-02-09 LAB
ALBUMIN SERPL-MCNC: 4.7 G/DL (ref 3.5–5)
ALP BLD-CCNC: 62 U/L (ref 35–125)
ALT SERPL-CCNC: 57 U/L (ref 5–40)
ANION GAP SERPL CALC-SCNC: 14 MMOL/L
AST SERPL-CCNC: 42 U/L (ref 5–40)
BASOPHILS # BLD AUTO: 0.09 X10*3/UL (ref 0–0.1)
BASOPHILS NFR BLD AUTO: 1.1 %
BILIRUB SERPL-MCNC: 0.5 MG/DL (ref 0.1–1.2)
BUN SERPL-MCNC: 16 MG/DL (ref 8–25)
CALCIUM SERPL-MCNC: 9.5 MG/DL (ref 8.5–10.4)
CHLORIDE SERPL-SCNC: 105 MMOL/L (ref 97–107)
CHOLEST SERPL-MCNC: 173 MG/DL (ref 133–200)
CHOLEST/HDLC SERPL: 3 {RATIO}
CO2 SERPL-SCNC: 20 MMOL/L (ref 24–31)
CREAT SERPL-MCNC: 0.8 MG/DL (ref 0.4–1.6)
EGFRCR SERPLBLD CKD-EPI 2021: 77 ML/MIN/1.73M*2
EOSINOPHIL # BLD AUTO: 0.4 X10*3/UL (ref 0–0.4)
EOSINOPHIL NFR BLD AUTO: 4.9 %
ERYTHROCYTE [DISTWIDTH] IN BLOOD BY AUTOMATED COUNT: 13.8 % (ref 11.5–14.5)
EST. AVERAGE GLUCOSE BLD GHB EST-MCNC: 123 MG/DL
GLUCOSE SERPL-MCNC: 88 MG/DL (ref 65–99)
HBA1C MFR BLD: 5.9 %
HCT VFR BLD AUTO: 48.7 % (ref 36–46)
HDLC SERPL-MCNC: 57 MG/DL
HGB BLD-MCNC: 15.4 G/DL (ref 12–16)
IMM GRANULOCYTES # BLD AUTO: 0.08 X10*3/UL (ref 0–0.5)
IMM GRANULOCYTES NFR BLD AUTO: 1 % (ref 0–0.9)
LDLC SERPL CALC-MCNC: 82 MG/DL (ref 65–130)
LYMPHOCYTES # BLD AUTO: 2.87 X10*3/UL (ref 0.8–3)
LYMPHOCYTES NFR BLD AUTO: 35.4 %
MCH RBC QN AUTO: 31 PG (ref 26–34)
MCHC RBC AUTO-ENTMCNC: 31.6 G/DL (ref 32–36)
MCV RBC AUTO: 98 FL (ref 80–100)
MONOCYTES # BLD AUTO: 0.64 X10*3/UL (ref 0.05–0.8)
MONOCYTES NFR BLD AUTO: 7.9 %
NEUTROPHILS # BLD AUTO: 4.03 X10*3/UL (ref 1.6–5.5)
NEUTROPHILS NFR BLD AUTO: 49.7 %
NRBC BLD-RTO: 0 /100 WBCS (ref 0–0)
PLATELET # BLD AUTO: 227 X10*3/UL (ref 150–450)
POTASSIUM SERPL-SCNC: 4.6 MMOL/L (ref 3.4–5.1)
PROT SERPL-MCNC: 7.2 G/DL (ref 5.9–7.9)
RBC # BLD AUTO: 4.97 X10*6/UL (ref 4–5.2)
SODIUM SERPL-SCNC: 139 MMOL/L (ref 133–145)
TRIGL SERPL-MCNC: 170 MG/DL (ref 40–150)
TSH SERPL DL<=0.05 MIU/L-ACNC: 2.86 MIU/L (ref 0.27–4.2)
WBC # BLD AUTO: 8.1 X10*3/UL (ref 4.4–11.3)

## 2024-02-09 PROCEDURE — 1160F RVW MEDS BY RX/DR IN RCRD: CPT | Performed by: INTERNAL MEDICINE

## 2024-02-09 PROCEDURE — 99214 OFFICE O/P EST MOD 30 MIN: CPT | Performed by: INTERNAL MEDICINE

## 2024-02-09 PROCEDURE — 1036F TOBACCO NON-USER: CPT | Performed by: INTERNAL MEDICINE

## 2024-02-09 PROCEDURE — 1126F AMNT PAIN NOTED NONE PRSNT: CPT | Performed by: INTERNAL MEDICINE

## 2024-02-09 PROCEDURE — 85025 COMPLETE CBC W/AUTO DIFF WBC: CPT

## 2024-02-09 PROCEDURE — 3080F DIAST BP >= 90 MM HG: CPT | Performed by: INTERNAL MEDICINE

## 2024-02-09 PROCEDURE — 36415 COLL VENOUS BLD VENIPUNCTURE: CPT

## 2024-02-09 PROCEDURE — 80061 LIPID PANEL: CPT

## 2024-02-09 PROCEDURE — 83036 HEMOGLOBIN GLYCOSYLATED A1C: CPT

## 2024-02-09 PROCEDURE — 1159F MED LIST DOCD IN RCRD: CPT | Performed by: INTERNAL MEDICINE

## 2024-02-09 PROCEDURE — 84443 ASSAY THYROID STIM HORMONE: CPT

## 2024-02-09 PROCEDURE — 3075F SYST BP GE 130 - 139MM HG: CPT | Performed by: INTERNAL MEDICINE

## 2024-02-09 PROCEDURE — 80053 COMPREHEN METABOLIC PANEL: CPT

## 2024-02-09 RX ORDER — AMLODIPINE BESYLATE 10 MG/1
10 TABLET ORAL DAILY
Qty: 90 TABLET | Refills: 3 | Status: SHIPPED | OUTPATIENT
Start: 2024-02-09 | End: 2025-02-08

## 2024-02-09 RX ORDER — TOBRAMYCIN 3 MG/ML
1 SOLUTION/ DROPS OPHTHALMIC EVERY 4 HOURS
Qty: 5 ML | Refills: 0 | Status: SHIPPED | OUTPATIENT
Start: 2024-02-09 | End: 2024-02-16

## 2024-02-09 ASSESSMENT — ENCOUNTER SYMPTOMS
LOSS OF SENSATION IN FEET: 0
OCCASIONAL FEELINGS OF UNSTEADINESS: 0
GASTROINTESTINAL NEGATIVE: 1
DEPRESSION: 0
RESPIRATORY NEGATIVE: 1
ENDOCRINE NEGATIVE: 1
CARDIOVASCULAR NEGATIVE: 1

## 2024-02-09 ASSESSMENT — PAIN SCALES - GENERAL: PAINLEVEL: 0-NO PAIN

## 2024-02-09 ASSESSMENT — PATIENT HEALTH QUESTIONNAIRE - PHQ9
2. FEELING DOWN, DEPRESSED OR HOPELESS: NOT AT ALL
1. LITTLE INTEREST OR PLEASURE IN DOING THINGS: NOT AT ALL
SUM OF ALL RESPONSES TO PHQ9 QUESTIONS 1 AND 2: 0

## 2024-02-09 NOTE — PATIENT INSTRUCTIONS
In terms of your medication I am not happy with your blood pressure lets increase your amlodipine from 5 mg daily to 10 mg daily.  I sent the 10 mg strength into [facility] for you but you can in the meantime take 5 mg 2 pills at once once a day.    I placed a referral for dietitian to help you with your weight.  I also need to explore whether you could benefit from Mounjaro or Ozempic to help you in this regard.  I think that that might be a good choice given your underlying prediabetes and underlying coronary disease.  We can do for now that your weight get out of control.    Blood work has been ordered today.  I also ordered your mammogram for the end of March.  I placed a referral in for you to complete that visit with Dr. Alexandra so we can explore what kind of follow-up you might need for that liver lesion that you have.    I will see you back next month.

## 2024-02-10 RX ORDER — TIRZEPATIDE 2.5 MG/.5ML
2.5 INJECTION, SOLUTION SUBCUTANEOUS
Qty: 2 ML | Refills: 0 | Status: SHIPPED | OUTPATIENT
Start: 2024-02-10 | End: 2024-02-13 | Stop reason: SDUPTHER

## 2024-02-12 ENCOUNTER — PATIENT MESSAGE (OUTPATIENT)
Dept: PRIMARY CARE | Facility: CLINIC | Age: 74
End: 2024-02-12
Payer: COMMERCIAL

## 2024-02-12 DIAGNOSIS — R73.03 PREDIABETES: ICD-10-CM

## 2024-02-13 RX ORDER — TIRZEPATIDE 2.5 MG/.5ML
2.5 INJECTION, SOLUTION SUBCUTANEOUS
Qty: 6 ML | Refills: 1 | Status: SHIPPED | OUTPATIENT
Start: 2024-02-13

## 2024-02-16 ENCOUNTER — PATIENT OUTREACH (OUTPATIENT)
Dept: CARE COORDINATION | Facility: CLINIC | Age: 74
End: 2024-02-16
Payer: COMMERCIAL

## 2024-03-19 ENCOUNTER — CLINICAL SUPPORT (OUTPATIENT)
Dept: CARE COORDINATION | Facility: CLINIC | Age: 74
End: 2024-03-19
Payer: COMMERCIAL

## 2024-03-19 NOTE — PROGRESS NOTES
Reason for Nutrition Visit:  Pt is a 73 y.o. female referred for weight management.  Pt reports her goal is to lose weight and improve her overall health, prevent diabetes      Past Medical Hx:  Patient Active Problem List   Diagnosis    Chronic obstructive pulmonary disease (CMS/HCC)    Elevated liver enzymes    Macrocytosis without anemia    Hyperlipidemia    Anxiety    Hepatomegaly    Prediabetes    History of hypertension    Obesity with body mass index 30 or greater    Rhinitis    Heart attack 2022, participated in cardiac rehab after, had a great experience, attended nutrition classes    Weight change:    Highest adult weight: 226 lbs  Current weight: 204 lbs  Has been on Mounjaro for 2 weeks so far, no side effects reported  Reports it being more difficult to lose weight as she got older    Lab Results   Component Value Date    HGBA1C 5.9 (H) 02/09/2024    CHOL 173 02/09/2024    TRIG 170 (H) 02/09/2024    HDL 57.0 02/09/2024      No data recorded   Food and Nutrition Hx:    24 Diet Recall:  Meal 1:  Either egg with toast, I can't believe its not butter OR greek yogurt, 2 cups black coffee   Meal 2: sometimes leftovers from eating out, or salad with light ranch  Meal 3:  out to eat: salad or chicken with veggies, shrimp, or pasta  Snacks: cheese cubes, a few club crackers  Beverages: water- about 60 oz daily, no pop, does drink whiskey daily in the evening   * reports she knows she does not eat enough vegetables    Past diet attempts: weight watchers lifetime member, Noom, Nutrisystem, diet pills, b12 shots, a few others  She has a good understanding of nutrition, but reports it difficult to implement sometimes    Intolerance: None  Appetite: Good      Types of Activities: Gym Membership  Frequency: 2 times a week  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  Sleep duration/quality : Disrupted sleep, up to use bathroom, 'thinking' keeps her awake sometimes    Supplements: Denies     Cravings : None no specific  cravings but keeps certain things out of the house like ice cream that is hard to resist    Food Preparation : Patient Used to cook a lot, but since being retired her  likes to eat out every night  Cooking Skills/Barriers : None reported  Grocery Shopping : Partner/Spouse    Eating Out Type : Dinner Frequency : daily  Karmen Cordero Sylvestros italian      Estimated Energy Needs:    Weight Maintanence: ~5363-9631 kcal/day   Weight Loss Needs: ~1782-9561 kcal/day    She is wondering if Nutrisystem might be a good option to start again, it worked for her in the past. But it is expensive. And not very sustainable. I recommend holding off on this. Since the Mounjara may help with eating smaller portions, I recommend giving it some time and we can reassess her progress as time goes on    Nutrition Goals:  Until our next visit work on goals/recommendations discussed    Nutrition Recommendations:  Decrease overall portions, choose healthy options when out to eat when able as discussed, increase consumption of fruits and vegetables, decrease alcohol consumption, use myplate as guide for balanced meals, include protein at each meal and snack if possible  Continue with consistent exercise routine and continue with consistent water consumption    Educational Handouts: Myplate guide and heart healthy handout  Follow up visit scheduled next month

## 2024-03-21 NOTE — PROGRESS NOTES
[facility]: [facility]  PROGRESS NOTE      Elsa Castellano is a 73 y.o. female that is presenting today for Follow-up.    Assessment/Plan   Diagnoses and all orders for this visit:  Prediabetes  Mixed hyperlipidemia  -     Comprehensive Metabolic Panel; Future  -     Lipid Panel; Future  Primary hypertension  Encounter for routine laboratory testing  -     Hemoglobin A1C; Future  Pre-diabetes  -     Hemoglobin A1C; Future  Other orders  -     Follow Up In Primary Care - Established  -     Follow Up In Primary Care - Established; Future  We reviewed the overall situation.  Her blood pressure is improved which is great.  She now has started the Mounjaro for her prediabetes/weight and is seeing the dietitian.  We are both very optimistic about the future.  We will keep her medicines the same and I will see her back in 3 months and see how her labs look prior to that visit  Subjective   She is here for follow-up for her medical problems.  In the interim she had a nice trip to [place].  In the interim she also saw Dr. Byrd regarding the liver lesion and they are going to just observe this with another scan in 3 months.  At her last visit we had increased her amlodipine from 5 to 10 mg daily which she has tolerated very well.  She also has started Mounjaro and thinks that this is having some beneficial impact on her diet/appetite and she is tolerating it well so far in the first few weeks.      Review of Systems   Respiratory: Negative.     Cardiovascular: Negative.    Gastrointestinal: Negative.    Genitourinary: Negative.       Objective   Vitals:    03/22/24 1045   BP: 126/74   Pulse: 72   Temp: 35.3 °C (95.5 °F)   SpO2: 97%      Body mass index is 32.73 kg/m².  Physical Exam  Cardiovascular:      Rate and Rhythm: Normal rate and regular rhythm.      Pulses: Normal pulses.      Heart sounds: Normal heart sounds.   Pulmonary:      Effort: Pulmonary effort is normal.      Breath sounds: Normal  "breath sounds.   Abdominal:      General: Abdomen is flat. Bowel sounds are normal.      Palpations: Abdomen is soft.   Musculoskeletal:      Cervical back: Normal range of motion and neck supple.   Skin:     General: Skin is warm and dry.       Diagnostic Results   Lab Results   Component Value Date    GLUCOSE 88 02/09/2024    CALCIUM 9.5 02/09/2024     02/09/2024    K 4.6 02/09/2024    CO2 20 (L) 02/09/2024     02/09/2024    BUN 16 02/09/2024    CREATININE 0.80 02/09/2024     Lab Results   Component Value Date    ALT 57 (H) 02/09/2024    AST 42 (H) 02/09/2024    ALKPHOS 62 02/09/2024    BILITOT 0.5 02/09/2024     Lab Results   Component Value Date    WBC 8.1 02/09/2024    HGB 15.4 02/09/2024    HCT 48.7 (H) 02/09/2024    MCV 98 02/09/2024     02/09/2024     Lab Results   Component Value Date    CHOL 173 02/09/2024    CHOL 158 08/05/2023    CHOL 216 (H) 01/27/2023     Lab Results   Component Value Date    HDL 57.0 02/09/2024    HDL 57 08/05/2023    HDL 60 01/27/2023     Lab Results   Component Value Date    LDLCALC 82 02/09/2024    LDLCALC 79 08/05/2023    LDLCALC 120 01/27/2023     Lab Results   Component Value Date    TRIG 170 (H) 02/09/2024    TRIG 112 08/05/2023    TRIG 181 (H) 01/27/2023     No components found for: \"CHOLHDL\"  Lab Results   Component Value Date    HGBA1C 5.9 (H) 02/09/2024     Other labs not included in the list above were reviewed either before or during this encounter.    History    Past Medical History:   Diagnosis Date    Acute non-ST segment elevation myocardial infarction (CMS/HCC) 08/28/2022    Anxiety 12/26/2023    Chest pain 09/08/2023    Chronic obstructive pulmonary disease (CMS/HCC) 09/08/2023    Elevated liver enzymes 09/08/2023    Hepatomegaly 08/14/2023    History of hypertension 12/30/2023    Hyperlipidemia 09/08/2023    Macrocytosis without anemia 09/08/2023    Palpitations 09/08/2023    Personal history of other diseases of the circulatory system     " History of hypertension    Personal history of other diseases of the respiratory system     History of chronic obstructive lung disease    Prediabetes 01/27/2023    Rhinitis 12/30/2023    Shortness of breath 09/08/2023     Past Surgical History:   Procedure Laterality Date    CARDIAC CATHETERIZATION  2022    x2    CATARACT EXTRACTION Bilateral 2014    COLONOSCOPY  2008    COLONOSCOPY  2013    COLONOSCOPY  2018    DILATION AND CURETTAGE OF UTERUS  2012    VAGINA SURGERY  1974     Family History   Problem Relation Name Age of Onset    Stroke Father      Heart attack Father      No Known Problems Brother       Social History     Socioeconomic History    Marital status:      Spouse name: Not on file    Number of children: Not on file    Years of education: Not on file    Highest education level: Not on file   Occupational History    Not on file   Tobacco Use    Smoking status: Former     Types: Cigarettes     Passive exposure: Past    Smokeless tobacco: Never   Vaping Use    Vaping Use: Never used   Substance and Sexual Activity    Alcohol use: Yes     Alcohol/week: 10.0 standard drinks of alcohol     Types: 10 Standard drinks or equivalent per week    Drug use: Never    Sexual activity: Defer   Other Topics Concern    Not on file   Social History Narrative    Not on file     Social Determinants of Health     Financial Resource Strain: Not on file   Food Insecurity: Not on file   Transportation Needs: Not on file   Physical Activity: Not on file   Stress: Not on file   Social Connections: Not on file   Intimate Partner Violence: Not on file   Housing Stability: Not on file     Allergies   Allergen Reactions    Penicillins Unknown, Anaphylaxis and Swelling    Hydrochlorothiazide Dizziness     Current Outpatient Medications on File Prior to Visit   Medication Sig Dispense Refill    albuterol (Proventil HFA) 90 mcg/actuation inhaler Inhale 2 puffs every 6 hours if needed for wheezing.      amLODIPine (Norvasc) 10 mg  tablet Take 1 tablet (10 mg) by mouth once daily. 90 tablet 3    aspirin (Adult Low Dose Aspirin) 81 mg EC tablet Take 1 tablet (81 mg) by mouth once daily.      atorvastatin (Lipitor) 40 mg tablet Take 1 tablet (40 mg) by mouth once daily.      metoprolol succinate XL (Toprol-XL) 50 mg 24 hr tablet Take 1 tablet (50 mg) by mouth once daily. 90 tablet 3    tirzepatide (Mounjaro) 2.5 mg/0.5 mL pen injector Inject 2.5 mg under the skin 1 (one) time per week. 6 mL 1    umeclidinium (Incruse Ellipta) 62.5 mcg/actuation inhalation Inhale.       No current facility-administered medications on file prior to visit.     Immunization History   Administered Date(s) Administered    DTaP vaccine, pediatric  (INFANRIX) 08/24/2007    Flu vaccine, quadrivalent, high-dose, preservative free, age 65y+ (FLUZONE) 09/23/2020, 09/28/2021, 10/19/2022    Influenza, High Dose Seasonal, Preservative Free 11/18/2015, 10/12/2018, 12/16/2019    Influenza, Seasonal, Quadrivalent, Adjuvanted 10/24/2023    Influenza, injectable, quadrivalent 10/09/2014, 11/12/2019, 10/12/2020    Influenza, seasonal, injectable 10/15/2010, 10/28/2011, 11/30/2012, 01/03/2014, 10/05/2015, 10/17/2016    Moderna COVID-19 vaccine, bivalent, blue cap/gray label *Check age/dose* 10/03/2022    Moderna SARS-CoV-2 Booster 01/31/2024    Moderna SARS-CoV-2 Vaccination 02/02/2021, 03/01/2021, 10/28/2021    Novel influenza-H1N1-09, preservative-free 01/14/2010    PPD Test 08/26/2011    Pneumococcal conjugate vaccine, 13-valent (PREVNAR 13) 01/16/2015    Pneumococcal conjugate vaccine, 20-valent (PREVNAR 20) 07/27/2022    Pneumococcal polysaccharide vaccine, 23-valent, age 2 years and older (PNEUMOVAX 23) 12/16/2019    Tdap vaccine, age 7 year and older (BOOSTRIX, ADACEL) 01/19/2022    Zoster, live 02/04/2010     Patient's medical history was reviewed and updated either before or during this encounter.       Gilbert Velazco MD

## 2024-03-22 ENCOUNTER — OFFICE VISIT (OUTPATIENT)
Dept: PRIMARY CARE | Facility: CLINIC | Age: 74
End: 2024-03-22
Payer: MEDICARE

## 2024-03-22 VITALS
BODY MASS INDEX: 32.8 KG/M2 | HEIGHT: 67 IN | OXYGEN SATURATION: 97 % | WEIGHT: 209 LBS | HEART RATE: 72 BPM | TEMPERATURE: 95.5 F | SYSTOLIC BLOOD PRESSURE: 126 MMHG | DIASTOLIC BLOOD PRESSURE: 74 MMHG

## 2024-03-22 DIAGNOSIS — R73.03 PRE-DIABETES: ICD-10-CM

## 2024-03-22 DIAGNOSIS — I10 PRIMARY HYPERTENSION: ICD-10-CM

## 2024-03-22 DIAGNOSIS — R73.03 PREDIABETES: Primary | ICD-10-CM

## 2024-03-22 DIAGNOSIS — Z01.89 ENCOUNTER FOR ROUTINE LABORATORY TESTING: ICD-10-CM

## 2024-03-22 DIAGNOSIS — E78.2 MIXED HYPERLIPIDEMIA: ICD-10-CM

## 2024-03-22 PROCEDURE — 3078F DIAST BP <80 MM HG: CPT | Performed by: INTERNAL MEDICINE

## 2024-03-22 PROCEDURE — 99213 OFFICE O/P EST LOW 20 MIN: CPT | Performed by: INTERNAL MEDICINE

## 2024-03-22 PROCEDURE — 3074F SYST BP LT 130 MM HG: CPT | Performed by: INTERNAL MEDICINE

## 2024-03-22 PROCEDURE — 1036F TOBACCO NON-USER: CPT | Performed by: INTERNAL MEDICINE

## 2024-03-22 PROCEDURE — 1159F MED LIST DOCD IN RCRD: CPT | Performed by: INTERNAL MEDICINE

## 2024-03-22 PROCEDURE — 1160F RVW MEDS BY RX/DR IN RCRD: CPT | Performed by: INTERNAL MEDICINE

## 2024-03-22 PROCEDURE — 1125F AMNT PAIN NOTED PAIN PRSNT: CPT | Performed by: INTERNAL MEDICINE

## 2024-03-22 PROCEDURE — G2211 COMPLEX E/M VISIT ADD ON: HCPCS | Performed by: INTERNAL MEDICINE

## 2024-03-22 ASSESSMENT — ENCOUNTER SYMPTOMS
RESPIRATORY NEGATIVE: 1
CARDIOVASCULAR NEGATIVE: 1
GASTROINTESTINAL NEGATIVE: 1
OCCASIONAL FEELINGS OF UNSTEADINESS: 0
DEPRESSION: 0
LOSS OF SENSATION IN FEET: 0

## 2024-03-22 ASSESSMENT — LIFESTYLE VARIABLES
HOW MANY STANDARD DRINKS CONTAINING ALCOHOL DO YOU HAVE ON A TYPICAL DAY: 1 OR 2
HOW OFTEN DO YOU HAVE A DRINK CONTAINING ALCOHOL: 4 OR MORE TIMES A WEEK
HOW OFTEN DURING THE LAST YEAR HAVE YOU NEEDED AN ALCOHOLIC DRINK FIRST THING IN THE MORNING TO GET YOURSELF GOING AFTER A NIGHT OF HEAVY DRINKING: NEVER
HOW OFTEN DURING THE LAST YEAR HAVE YOU BEEN UNABLE TO REMEMBER WHAT HAPPENED THE NIGHT BEFORE BECAUSE YOU HAD BEEN DRINKING: NEVER
HOW OFTEN DURING THE LAST YEAR HAVE YOU FAILED TO DO WHAT WAS NORMALLY EXPECTED FROM YOU BECAUSE OF DRINKING: NEVER
HAS A RELATIVE, FRIEND, DOCTOR, OR ANOTHER HEALTH PROFESSIONAL EXPRESSED CONCERN ABOUT YOUR DRINKING OR SUGGESTED YOU CUT DOWN: NO
HOW OFTEN DURING THE LAST YEAR HAVE YOU FOUND THAT YOU WERE NOT ABLE TO STOP DRINKING ONCE YOU HAD STARTED: NEVER
AUDIT-C TOTAL SCORE: 4
SKIP TO QUESTIONS 9-10: 1
HOW OFTEN DURING THE LAST YEAR HAVE YOU HAD A FEELING OF GUILT OR REMORSE AFTER DRINKING: NEVER
AUDIT TOTAL SCORE: 4
HAVE YOU OR SOMEONE ELSE BEEN INJURED AS A RESULT OF YOUR DRINKING: NO
HOW OFTEN DO YOU HAVE SIX OR MORE DRINKS ON ONE OCCASION: NEVER

## 2024-03-22 ASSESSMENT — PAIN SCALES - GENERAL: PAINLEVEL: 6

## 2024-03-22 NOTE — PATIENT INSTRUCTIONS
It looks like you are doing really well with the changes in your medications.  Lets keep this program the same and I will plan on seeing you back in 3 months.  I did order a blood test that you can do a few days before that appointment.

## 2024-03-25 ENCOUNTER — APPOINTMENT (OUTPATIENT)
Dept: RADIOLOGY | Facility: CLINIC | Age: 74
End: 2024-03-25
Payer: COMMERCIAL

## 2024-03-28 ENCOUNTER — HOSPITAL ENCOUNTER (OUTPATIENT)
Dept: RADIOLOGY | Facility: CLINIC | Age: 74
Discharge: HOME | End: 2024-03-28
Payer: MEDICARE

## 2024-03-28 VITALS — BODY MASS INDEX: 30.62 KG/M2 | HEIGHT: 68 IN | WEIGHT: 202 LBS

## 2024-03-28 DIAGNOSIS — Z12.31 OTHER SCREENING MAMMOGRAM: ICD-10-CM

## 2024-03-28 DIAGNOSIS — Z12.31 SCREENING MAMMOGRAM FOR BREAST CANCER: ICD-10-CM

## 2024-03-28 PROCEDURE — 77063 BREAST TOMOSYNTHESIS BI: CPT | Performed by: RADIOLOGY

## 2024-03-28 PROCEDURE — 77067 SCR MAMMO BI INCL CAD: CPT | Performed by: RADIOLOGY

## 2024-03-28 PROCEDURE — 77067 SCR MAMMO BI INCL CAD: CPT

## 2024-04-17 ENCOUNTER — PATIENT MESSAGE (OUTPATIENT)
Dept: PRIMARY CARE | Facility: CLINIC | Age: 74
End: 2024-04-17
Payer: COMMERCIAL

## 2024-04-17 DIAGNOSIS — R73.03 PREDIABETES: Primary | ICD-10-CM

## 2024-04-17 RX ORDER — TIRZEPATIDE 5 MG/.5ML
5 INJECTION, SOLUTION SUBCUTANEOUS
Qty: 2 ML | Refills: 2 | Status: SHIPPED | OUTPATIENT
Start: 2024-04-21

## 2024-04-23 ENCOUNTER — CLINICAL SUPPORT (OUTPATIENT)
Dept: CARE COORDINATION | Facility: CLINIC | Age: 74
End: 2024-04-23
Payer: MEDICARE

## 2024-04-23 NOTE — PROGRESS NOTES
Met with patient for nutrition counseling follow up. Her weight is 201 lbs, down 3 lbs since our last visit. She states she feels frustrated by the slowness of progress. We discussed how it may be slow, but more sustainable and this is still great progress. She explains she was able to make some changes in the past month. She has cut her alcohol consumption in half, she is adding water to beverages to make them last longer. She finds this very helpful especially in a social setting. She has also increased her fruit and vegetable intake, eating more salads for lunches. She is not snacking and has continued to drink a lot of water. She has also continued her walks for exercise. We discussed my recommendation to keep doing what she is doing as this is great progress. She agrees and looks forward to continuing and seeing how the next month goes.

## 2024-05-01 DIAGNOSIS — E78.49 OTHER HYPERLIPIDEMIA: ICD-10-CM

## 2024-05-01 RX ORDER — ATORVASTATIN CALCIUM 40 MG/1
40 TABLET, FILM COATED ORAL DAILY
Qty: 90 TABLET | Refills: 3 | Status: SHIPPED | OUTPATIENT
Start: 2024-05-01

## 2024-05-29 ENCOUNTER — CLINICAL SUPPORT (OUTPATIENT)
Dept: CARE COORDINATION | Facility: CLINIC | Age: 74
End: 2024-05-29
Payer: MEDICARE

## 2024-05-29 NOTE — PROGRESS NOTES
Met with Adwoa for nutrition counseling follow up. Her weight is now 198lbs! She is very happy to be under 200 lbs and to be seeing continued progress. We discussed how this is so encouraging and motivating. She reports continuing to be consistent with these habits: drinking water about 60 oz daily, eating more fruits and vegetables, salads often for lunch, not snacking often, prioritizing protein, and going to the gym. The main thing she has adjusted since our last visit is eating less at dinner, she reports cutting her portions  at dinner in half. She feels confident she can continue these efforts. We discussed no new goals at this time, just continue being consistent with current behaviors.

## 2024-06-18 ENCOUNTER — LAB (OUTPATIENT)
Dept: LAB | Facility: LAB | Age: 74
End: 2024-06-18
Payer: COMMERCIAL

## 2024-06-18 DIAGNOSIS — Z01.89 ENCOUNTER FOR ROUTINE LABORATORY TESTING: ICD-10-CM

## 2024-06-18 DIAGNOSIS — E78.2 MIXED HYPERLIPIDEMIA: ICD-10-CM

## 2024-06-18 DIAGNOSIS — R73.03 PRE-DIABETES: ICD-10-CM

## 2024-06-18 LAB
ALBUMIN SERPL-MCNC: 4.4 G/DL (ref 3.5–5)
ALP BLD-CCNC: 62 U/L (ref 35–125)
ALT SERPL-CCNC: 26 U/L (ref 5–40)
ANION GAP SERPL CALC-SCNC: 12 MMOL/L
AST SERPL-CCNC: 30 U/L (ref 5–40)
BILIRUB SERPL-MCNC: 0.5 MG/DL (ref 0.1–1.2)
BUN SERPL-MCNC: 19 MG/DL (ref 8–25)
CALCIUM SERPL-MCNC: 9.8 MG/DL (ref 8.5–10.4)
CHLORIDE SERPL-SCNC: 103 MMOL/L (ref 97–107)
CHOLEST SERPL-MCNC: 162 MG/DL (ref 133–200)
CHOLEST/HDLC SERPL: 2.6 {RATIO}
CO2 SERPL-SCNC: 21 MMOL/L (ref 24–31)
CREAT SERPL-MCNC: 0.9 MG/DL (ref 0.4–1.6)
EGFRCR SERPLBLD CKD-EPI 2021: 67 ML/MIN/1.73M*2
EST. AVERAGE GLUCOSE BLD GHB EST-MCNC: 111 MG/DL
GLUCOSE SERPL-MCNC: 94 MG/DL (ref 65–99)
HBA1C MFR BLD: 5.5 %
HDLC SERPL-MCNC: 62 MG/DL
LDLC SERPL CALC-MCNC: 80 MG/DL (ref 65–130)
POTASSIUM SERPL-SCNC: 4.7 MMOL/L (ref 3.4–5.1)
PROT SERPL-MCNC: 7 G/DL (ref 5.9–7.9)
SODIUM SERPL-SCNC: 136 MMOL/L (ref 133–145)
TRIGL SERPL-MCNC: 99 MG/DL (ref 40–150)

## 2024-06-18 PROCEDURE — 80061 LIPID PANEL: CPT

## 2024-06-18 PROCEDURE — 83036 HEMOGLOBIN GLYCOSYLATED A1C: CPT

## 2024-06-18 PROCEDURE — 80053 COMPREHEN METABOLIC PANEL: CPT

## 2024-06-25 NOTE — PROGRESS NOTES
[facility]: [facility]  PROGRESS NOTE      Elsa Castellano is a 73 y.o. female that is presenting today for Follow-up.    Assessment/Plan   Diagnoses and all orders for this visit:  Prediabetes  -     Hemoglobin A1C; Future  Mixed hyperlipidemia  -     Lipid Panel; Future  History of hypertension  -     CBC and Auto Differential; Future  -     Comprehensive Metabolic Panel; Future  Anxiety  Encounter for screening for malignant neoplasm of colon  -     Cologuard® colon cancer screening; Future  Encounter for routine laboratory testing  -     Hemoglobin A1C; Future  Other orders  -     Follow Up In Primary Care - Established  -     Follow Up In Primary Care - Medicare Annual; Future  We reviewed her current situation as well as her recent blood test.  She has lost now 8 pounds.  She is tolerating the Mounjaro fine but is having a hard time titrating up on the dose because of availability.  She is going to use up her current supply of 2.5 and then she is going to explore again getting the 5 mg.  It is still not available we will also we could explore getting her switched over to zepbound instead.    Terms of blood work everything has moved in the right direction prediabetes cholesterol and blood pressure are all stable at goal.  We did realize she needs a Cologuard and that is ordered for her for colon cancer screening.    I will see her back in 6 months.  Subjective   She is here for her follow-up visit she continues to take her Mounjaro and is tolerating it very well and thinks that it is benefiting from her.  She overall is happy with how she is feeling and is not having any new medical issues.      Review of Systems   Respiratory: Negative.     Cardiovascular: Negative.    Gastrointestinal: Negative.    Genitourinary: Negative.       Objective   Vitals:    06/26/24 0839   BP: 128/78   Pulse: 78   Temp: 36.5 °C (97.7 °F)   SpO2: 95%      Body mass index is 30.57 kg/m².  Physical  "Exam  Cardiovascular:      Rate and Rhythm: Normal rate and regular rhythm.      Pulses: Normal pulses.      Heart sounds: Normal heart sounds.   Pulmonary:      Effort: Pulmonary effort is normal.      Breath sounds: Normal breath sounds.   Abdominal:      General: Abdomen is flat. Bowel sounds are normal.      Palpations: Abdomen is soft.   Musculoskeletal:         General: Normal range of motion.   Psychiatric:         Mood and Affect: Mood normal.       Diagnostic Results   Lab Results   Component Value Date    GLUCOSE 94 06/18/2024    CALCIUM 9.8 06/18/2024     06/18/2024    K 4.7 06/18/2024    CO2 21 (L) 06/18/2024     06/18/2024    BUN 19 06/18/2024    CREATININE 0.90 06/18/2024     Lab Results   Component Value Date    ALT 26 06/18/2024    AST 30 06/18/2024    ALKPHOS 62 06/18/2024    BILITOT 0.5 06/18/2024     Lab Results   Component Value Date    WBC 8.1 02/09/2024    HGB 15.4 02/09/2024    HCT 48.7 (H) 02/09/2024    MCV 98 02/09/2024     02/09/2024     Lab Results   Component Value Date    CHOL 162 06/18/2024    CHOL 173 02/09/2024    CHOL 158 08/05/2023     Lab Results   Component Value Date    HDL 62.0 06/18/2024    HDL 57.0 02/09/2024    HDL 57 08/05/2023     Lab Results   Component Value Date    LDLCALC 80 06/18/2024    LDLCALC 82 02/09/2024    LDLCALC 79 08/05/2023     Lab Results   Component Value Date    TRIG 99 06/18/2024    TRIG 170 (H) 02/09/2024    TRIG 112 08/05/2023     No components found for: \"CHOLHDL\"  Lab Results   Component Value Date    HGBA1C 5.5 06/18/2024     Other labs not included in the list above were reviewed either before or during this encounter.    History    Past Medical History:   Diagnosis Date    Acute non-ST segment elevation myocardial infarction (Multi) 08/28/2022    Anxiety 12/26/2023    Chest pain 09/08/2023    Chronic obstructive pulmonary disease (Multi) 09/08/2023    Elevated liver enzymes 09/08/2023    Hepatomegaly 08/14/2023    History of " hypertension 12/30/2023    Hyperlipidemia 09/08/2023    Macrocytosis without anemia 09/08/2023    Palpitations 09/08/2023    Personal history of other diseases of the circulatory system     History of hypertension    Personal history of other diseases of the respiratory system     History of chronic obstructive lung disease    Prediabetes 01/27/2023    Rhinitis 12/30/2023    Shortness of breath 09/08/2023     Past Surgical History:   Procedure Laterality Date    CARDIAC CATHETERIZATION  2022    x2    CATARACT EXTRACTION Bilateral 2014    COLONOSCOPY  2008    COLONOSCOPY  2013    COLONOSCOPY  2018    DILATION AND CURETTAGE OF UTERUS  2012    VAGINA SURGERY  1974     Family History   Problem Relation Name Age of Onset    Stroke Father      Heart attack Father      No Known Problems Brother      Breast cancer Paternal Grandmother      Breast cancer Father's Sister       Social History     Socioeconomic History    Marital status:      Spouse name: Not on file    Number of children: Not on file    Years of education: Not on file    Highest education level: Not on file   Occupational History    Not on file   Tobacco Use    Smoking status: Former     Types: Cigarettes     Passive exposure: Past    Smokeless tobacco: Never   Vaping Use    Vaping status: Never Used   Substance and Sexual Activity    Alcohol use: Yes     Alcohol/week: 10.0 standard drinks of alcohol     Types: 10 Standard drinks or equivalent per week    Drug use: Never    Sexual activity: Defer   Other Topics Concern    Not on file   Social History Narrative    Not on file     Social Determinants of Health     Financial Resource Strain: Not on file   Food Insecurity: Not on file   Transportation Needs: Not on file   Physical Activity: Not on file   Stress: Not on file   Social Connections: Not on file   Intimate Partner Violence: Not on file   Housing Stability: Not on file     Allergies   Allergen Reactions    Penicillins Unknown, Anaphylaxis and  Swelling    Hydrochlorothiazide Dizziness     Current Outpatient Medications on File Prior to Visit   Medication Sig Dispense Refill    albuterol (Proventil HFA) 90 mcg/actuation inhaler Inhale 2 puffs every 6 hours if needed for wheezing.      amLODIPine (Norvasc) 10 mg tablet Take 1 tablet (10 mg) by mouth once daily. 90 tablet 3    aspirin (Adult Low Dose Aspirin) 81 mg EC tablet Take 1 tablet (81 mg) by mouth once daily.      atorvastatin (Lipitor) 40 mg tablet Take 1 tablet (40 mg) by mouth once daily. 90 tablet 3    metoprolol succinate XL (Toprol-XL) 50 mg 24 hr tablet Take 1 tablet (50 mg) by mouth once daily. 90 tablet 3    tirzepatide (Mounjaro) 2.5 mg/0.5 mL pen injector Inject 2.5 mg under the skin 1 (one) time per week. 6 mL 1    umeclidinium (Incruse Ellipta) 62.5 mcg/actuation inhalation Inhale.      tirzepatide (Mounjaro) 5 mg/0.5 mL pen injector Inject 5 mg under the skin 1 (one) time per week. (Patient not taking: Reported on 6/26/2024) 2 mL 2     No current facility-administered medications on file prior to visit.     Immunization History   Administered Date(s) Administered    DTaP vaccine, pediatric  (INFANRIX) 08/24/2007    Flu vaccine, quadrivalent, high-dose, preservative free, age 65y+ (FLUZONE) 09/23/2020, 09/28/2021, 10/19/2022    Influenza, High Dose Seasonal, Preservative Free 11/18/2015, 10/12/2018, 12/16/2019    Influenza, Seasonal, Quadrivalent, Adjuvanted 10/24/2023    Influenza, injectable, quadrivalent 10/09/2014, 11/12/2019, 10/12/2020    Influenza, seasonal, injectable 10/15/2010, 10/28/2011, 11/30/2012, 01/03/2014, 10/05/2015, 10/17/2016    Moderna COVID-19 vaccine, bivalent, blue cap/gray label *Check age/dose* 10/03/2022    Moderna SARS-CoV-2 Booster 01/31/2024    Moderna SARS-CoV-2 Vaccination 02/02/2021, 03/01/2021, 10/28/2021    Novel influenza-H1N1-09, preservative-free 01/14/2010    PPD Test 08/26/2011    Pneumococcal conjugate vaccine, 13-valent (PREVNAR 13) 01/16/2015     Pneumococcal conjugate vaccine, 20-valent (PREVNAR 20) 07/27/2022    Pneumococcal polysaccharide vaccine, 23-valent, age 2 years and older (PNEUMOVAX 23) 12/16/2019    Tdap vaccine, age 7 year and older (BOOSTRIX, ADACEL) 01/19/2022    Zoster, live 02/04/2010     Patient's medical history was reviewed and updated either before or during this encounter.       Gilbert Velazco MD

## 2024-06-26 ENCOUNTER — OFFICE VISIT (OUTPATIENT)
Dept: PRIMARY CARE | Facility: CLINIC | Age: 74
End: 2024-06-26
Payer: MEDICARE

## 2024-06-26 VITALS
TEMPERATURE: 97.7 F | BODY MASS INDEX: 30.46 KG/M2 | HEIGHT: 68 IN | WEIGHT: 201 LBS | SYSTOLIC BLOOD PRESSURE: 128 MMHG | HEART RATE: 78 BPM | DIASTOLIC BLOOD PRESSURE: 78 MMHG | OXYGEN SATURATION: 95 %

## 2024-06-26 DIAGNOSIS — F41.9 ANXIETY: ICD-10-CM

## 2024-06-26 DIAGNOSIS — Z01.89 ENCOUNTER FOR ROUTINE LABORATORY TESTING: ICD-10-CM

## 2024-06-26 DIAGNOSIS — Z12.11 ENCOUNTER FOR SCREENING FOR MALIGNANT NEOPLASM OF COLON: ICD-10-CM

## 2024-06-26 DIAGNOSIS — E78.2 MIXED HYPERLIPIDEMIA: ICD-10-CM

## 2024-06-26 DIAGNOSIS — R73.03 PREDIABETES: Primary | ICD-10-CM

## 2024-06-26 DIAGNOSIS — Z86.79 HISTORY OF HYPERTENSION: ICD-10-CM

## 2024-06-26 PROCEDURE — 1159F MED LIST DOCD IN RCRD: CPT | Performed by: INTERNAL MEDICINE

## 2024-06-26 PROCEDURE — 99214 OFFICE O/P EST MOD 30 MIN: CPT | Performed by: INTERNAL MEDICINE

## 2024-06-26 PROCEDURE — G2211 COMPLEX E/M VISIT ADD ON: HCPCS | Performed by: INTERNAL MEDICINE

## 2024-06-26 PROCEDURE — 1160F RVW MEDS BY RX/DR IN RCRD: CPT | Performed by: INTERNAL MEDICINE

## 2024-06-26 PROCEDURE — 1126F AMNT PAIN NOTED NONE PRSNT: CPT | Performed by: INTERNAL MEDICINE

## 2024-06-26 ASSESSMENT — PATIENT HEALTH QUESTIONNAIRE - PHQ9
2. FEELING DOWN, DEPRESSED OR HOPELESS: NOT AT ALL
SUM OF ALL RESPONSES TO PHQ9 QUESTIONS 1 AND 2: 0
1. LITTLE INTEREST OR PLEASURE IN DOING THINGS: NOT AT ALL

## 2024-06-26 ASSESSMENT — ENCOUNTER SYMPTOMS
GASTROINTESTINAL NEGATIVE: 1
RESPIRATORY NEGATIVE: 1
CARDIOVASCULAR NEGATIVE: 1

## 2024-06-26 ASSESSMENT — PAIN SCALES - GENERAL: PAINLEVEL: 0-NO PAIN

## 2024-06-26 NOTE — PATIENT INSTRUCTIONS
Looks like you are doing really well right now lets keep your medicines the same and I will plan on seeing you back in 6 months.  In when you are out of your 2.5 mg Mounjaro is let us know because I would really like to move that up to 5 mg or switch you to zepbound as we talked about.    Enjoy the summer and fall I will see what is colder outside.    As we discussed , george ordered.

## 2024-07-05 ENCOUNTER — TELEPHONE (OUTPATIENT)
Dept: PRIMARY CARE | Facility: CLINIC | Age: 74
End: 2024-07-05
Payer: COMMERCIAL

## 2024-07-05 DIAGNOSIS — J40 BRONCHITIS: Primary | ICD-10-CM

## 2024-07-05 RX ORDER — AZITHROMYCIN 250 MG/1
TABLET, FILM COATED ORAL DAILY
Qty: 6 TABLET | Refills: 0 | Status: SHIPPED | OUTPATIENT
Start: 2024-07-05 | End: 2024-07-10

## 2024-07-05 NOTE — TELEPHONE ENCOUNTER
Patient has productive rattling cough x 4 day covid negative, denies fever or any other Sx taking robitussin OTC and its not helping asking for Zpak productive yellow cough [facility] [employer] ([place])

## 2024-07-08 ENCOUNTER — APPOINTMENT (OUTPATIENT)
Dept: CARE COORDINATION | Facility: CLINIC | Age: 74
End: 2024-07-08
Payer: COMMERCIAL

## 2024-07-08 LAB — NONINV COLON CA DNA+OCC BLD SCRN STL QL: NEGATIVE

## 2024-07-15 ENCOUNTER — TELEPHONE (OUTPATIENT)
Dept: PRIMARY CARE | Facility: CLINIC | Age: 74
End: 2024-07-15

## 2024-07-15 DIAGNOSIS — R73.03 PREDIABETES: ICD-10-CM

## 2024-07-15 RX ORDER — TIRZEPATIDE 2.5 MG/.5ML
2.5 INJECTION, SOLUTION SUBCUTANEOUS
Qty: 6 ML | Refills: 1 | Status: SHIPPED | OUTPATIENT
Start: 2024-07-15 | End: 2024-07-15 | Stop reason: DRUGHIGH

## 2024-07-15 RX ORDER — TIRZEPATIDE 5 MG/.5ML
5 INJECTION, SOLUTION SUBCUTANEOUS
Qty: 2 ML | Refills: 2 | Status: SHIPPED | OUTPATIENT
Start: 2024-07-15

## 2024-07-29 ENCOUNTER — APPOINTMENT (OUTPATIENT)
Dept: CARE COORDINATION | Facility: CLINIC | Age: 74
End: 2024-07-29
Payer: COMMERCIAL

## 2024-07-30 NOTE — PROGRESS NOTES
Met with patient for nutrition counseling follow up. Her weight is 197 lbs, she was hoping for more weight loss. Adwoa is really happy her lab work has improved. Total cholesterol is now 162, TG are 99, LDL is 80, HDL 62, A1c is 5.5. These are all improvements from 5 months ago and a year ago. We discussed how great this is and she has done all the hard work making changes. She has continued to eat a lot of salads, consistently drinking water, drinking less alcohol, she eats a lot of the same foods. She does not get tired of eating the same things. She wants to try time restricted eating, maybe shortening the window by moving her breakfast later. We discussed some pros and cons to this. All questions were answered, she feels confident about moving forward and continuing to be consistent with healthy habits. Next follow up planned for 9/16.

## 2024-08-22 NOTE — PROGRESS NOTES
"[facility]: [facility]  PROGRESS NOTE      Elsa Castellano is a 73 y.o. female that is presenting today for No chief complaint on file..    Assessment/Plan   {Assess/Plan SmartLinks (Optional):27564}  Subjective   HPI  Review of Systems   Objective   There were no vitals filed for this visit.   There is no height or weight on file to calculate BMI.  Physical Exam  Diagnostic Results   Lab Results   Component Value Date    GLUCOSE 94 06/18/2024    CALCIUM 9.8 06/18/2024     06/18/2024    K 4.7 06/18/2024    CO2 21 (L) 06/18/2024     06/18/2024    BUN 19 06/18/2024    CREATININE 0.90 06/18/2024     Lab Results   Component Value Date    ALT 26 06/18/2024    AST 30 06/18/2024    ALKPHOS 62 06/18/2024    BILITOT 0.5 06/18/2024     Lab Results   Component Value Date    WBC 8.1 02/09/2024    HGB 15.4 02/09/2024    HCT 48.7 (H) 02/09/2024    MCV 98 02/09/2024     02/09/2024     Lab Results   Component Value Date    CHOL 162 06/18/2024    CHOL 173 02/09/2024    CHOL 158 08/05/2023     Lab Results   Component Value Date    HDL 62.0 06/18/2024    HDL 57.0 02/09/2024    HDL 57 08/05/2023     Lab Results   Component Value Date    LDLCALC 80 06/18/2024    LDLCALC 82 02/09/2024    LDLCALC 79 08/05/2023     Lab Results   Component Value Date    TRIG 99 06/18/2024    TRIG 170 (H) 02/09/2024    TRIG 112 08/05/2023     No components found for: \"CHOLHDL\"  Lab Results   Component Value Date    HGBA1C 5.5 06/18/2024     Other labs not included in the list above were reviewed either before or during this encounter.    History    Past Medical History:   Diagnosis Date    Acute non-ST segment elevation myocardial infarction (Multi) 08/28/2022    Anxiety 12/26/2023    Chest pain 09/08/2023    Chronic obstructive pulmonary disease (Multi) 09/08/2023    Elevated liver enzymes 09/08/2023    Hepatomegaly 08/14/2023    History of hypertension 12/30/2023    Hyperlipidemia 09/08/2023    Macrocytosis without " anemia 09/08/2023    Palpitations 09/08/2023    Personal history of other diseases of the circulatory system     History of hypertension    Personal history of other diseases of the respiratory system     History of chronic obstructive lung disease    Prediabetes 01/27/2023    Rhinitis 12/30/2023    Shortness of breath 09/08/2023     Past Surgical History:   Procedure Laterality Date    CARDIAC CATHETERIZATION  2022    x2    CATARACT EXTRACTION Bilateral 2014    COLONOSCOPY  2008    COLONOSCOPY  2013    COLONOSCOPY  2018    DILATION AND CURETTAGE OF UTERUS  2012    VAGINA SURGERY  1974     Family History   Problem Relation Name Age of Onset    Stroke Father      Heart attack Father      No Known Problems Brother      Breast cancer Paternal Grandmother      Breast cancer Father's Sister       Social History     Socioeconomic History    Marital status:      Spouse name: Not on file    Number of children: Not on file    Years of education: Not on file    Highest education level: Not on file   Occupational History    Not on file   Tobacco Use    Smoking status: Former     Types: Cigarettes     Passive exposure: Past    Smokeless tobacco: Never   Vaping Use    Vaping status: Never Used   Substance and Sexual Activity    Alcohol use: Yes     Alcohol/week: 10.0 standard drinks of alcohol     Types: 10 Standard drinks or equivalent per week    Drug use: Never    Sexual activity: Defer   Other Topics Concern    Not on file   Social History Narrative    Not on file     Social Determinants of Health     Financial Resource Strain: Not on file   Food Insecurity: Not on file   Transportation Needs: Not on file   Physical Activity: Not on file   Stress: Not on file   Social Connections: Not on file   Intimate Partner Violence: Not on file   Housing Stability: Not on file     Allergies   Allergen Reactions    Penicillins Unknown, Anaphylaxis and Swelling    Hydrochlorothiazide Dizziness     Current Outpatient Medications on  File Prior to Visit   Medication Sig Dispense Refill    albuterol (Proventil HFA) 90 mcg/actuation inhaler Inhale 2 puffs every 6 hours if needed for wheezing.      amLODIPine (Norvasc) 10 mg tablet Take 1 tablet (10 mg) by mouth once daily. 90 tablet 3    aspirin (Adult Low Dose Aspirin) 81 mg EC tablet Take 1 tablet (81 mg) by mouth once daily.      atorvastatin (Lipitor) 40 mg tablet Take 1 tablet (40 mg) by mouth once daily. 90 tablet 3    metoprolol succinate XL (Toprol-XL) 50 mg 24 hr tablet Take 1 tablet (50 mg) by mouth once daily. 90 tablet 3    tirzepatide (Mounjaro) 5 mg/0.5 mL pen injector Inject 5 mg under the skin 1 (one) time per week. 2 mL 2    umeclidinium (Incruse Ellipta) 62.5 mcg/actuation inhalation Inhale.       No current facility-administered medications on file prior to visit.     Immunization History   Administered Date(s) Administered    DTaP vaccine, pediatric  (INFANRIX) 08/24/2007    Flu vaccine, quadrivalent, high-dose, preservative free, age 65y+ (FLUZONE) 09/23/2020, 09/28/2021, 10/19/2022    Flu vaccine, trivalent, preservative free, HIGH-DOSE, age 65y+ (Fluzone) 11/18/2015, 10/12/2018, 12/16/2019    Influenza, Seasonal, Quadrivalent, Adjuvanted 10/24/2023    Influenza, injectable, quadrivalent 10/09/2014, 11/12/2019, 10/12/2020    Influenza, seasonal, injectable 10/15/2010, 10/28/2011, 11/30/2012, 01/03/2014, 10/05/2015, 10/17/2016    Moderna COVID-19 vaccine, bivalent, blue cap/gray label *Check age/dose* 10/03/2022    Moderna SARS-CoV-2 Booster 01/31/2024    Moderna SARS-CoV-2 Vaccination 02/02/2021, 03/01/2021, 10/28/2021    Novel influenza-H1N1-09, preservative-free 01/14/2010    PPD Test 08/26/2011    Pneumococcal conjugate vaccine, 13-valent (PREVNAR 13) 01/16/2015    Pneumococcal conjugate vaccine, 20-valent (PREVNAR 20) 07/27/2022    Pneumococcal polysaccharide vaccine, 23-valent, age 2 years and older (PNEUMOVAX 23) 12/16/2019    Tdap vaccine, age 7 year and older  (BOOSTRIX, ADACEL) 01/19/2022    Zoster, live 02/04/2010     Patient's medical history was reviewed and updated either before or during this encounter.       Gilbert Velazco MD

## 2024-08-23 ENCOUNTER — APPOINTMENT (OUTPATIENT)
Dept: PRIMARY CARE | Facility: CLINIC | Age: 74
End: 2024-08-23
Payer: MEDICARE

## 2024-08-23 DIAGNOSIS — L30.9 DERMATITIS: Primary | ICD-10-CM

## 2024-09-09 ENCOUNTER — HOSPITAL ENCOUNTER (OUTPATIENT)
Dept: RADIOLOGY | Facility: HOSPITAL | Age: 74
Discharge: HOME | End: 2024-09-09
Payer: MEDICARE

## 2024-09-09 DIAGNOSIS — D13.4 BENIGN NEOPLASM OF LIVER: ICD-10-CM

## 2024-09-09 PROCEDURE — A9575 INJ GADOTERATE MEGLUMI 0.1ML: HCPCS | Performed by: INTERNAL MEDICINE

## 2024-09-09 PROCEDURE — 74183 MRI ABD W/O CNTR FLWD CNTR: CPT

## 2024-09-09 PROCEDURE — 2550000001 HC RX 255 CONTRASTS: Performed by: INTERNAL MEDICINE

## 2024-09-09 PROCEDURE — 74183 MRI ABD W/O CNTR FLWD CNTR: CPT | Performed by: STUDENT IN AN ORGANIZED HEALTH CARE EDUCATION/TRAINING PROGRAM

## 2024-09-09 RX ORDER — GADOTERATE MEGLUMINE 376.9 MG/ML
18 INJECTION INTRAVENOUS
Status: COMPLETED | OUTPATIENT
Start: 2024-09-09 | End: 2024-09-09

## 2024-09-10 DIAGNOSIS — R16.0 LIVER MASS, LEFT LOBE: Primary | ICD-10-CM

## 2024-09-10 NOTE — PROGRESS NOTES
Called patient and advised liver biopsy of the liver lesion. She is agreeable. Discussed in general what a liver biopsy entails. Gave number for scheduling. Order for CT guided liver biopsy ordered. Salina Alexandra, DO

## 2024-09-15 DIAGNOSIS — R73.03 PREDIABETES: ICD-10-CM

## 2024-09-16 ENCOUNTER — APPOINTMENT (OUTPATIENT)
Dept: CARE COORDINATION | Facility: CLINIC | Age: 74
End: 2024-09-16
Payer: COMMERCIAL

## 2024-09-17 RX ORDER — TIRZEPATIDE 5 MG/.5ML
5 INJECTION, SOLUTION SUBCUTANEOUS WEEKLY
Qty: 0.5 ML | Refills: 11 | Status: SHIPPED | OUTPATIENT
Start: 2024-09-17

## 2024-09-17 NOTE — PROGRESS NOTES
"Nutrition Follow-up   Dietitian  7 week  follow-up. Pt is being seen at [facility] for  wt management, improving overall health    Labs  Lab Results   Component Value Date    HGBA1C 5.5 06/18/2024    HGBA1C 5.9 (H) 02/09/2024    HGBA1C 5.6 01/27/2023     06/18/2024    K 4.7 06/18/2024     06/18/2024    CO2 21 (L) 06/18/2024    BUN 19 06/18/2024    CREATININE 0.90 06/18/2024    CALCIUM 9.8 06/18/2024    ALBUMIN 4.4 06/18/2024    PROT 7.0 06/18/2024    BILITOT 0.5 06/18/2024    ALKPHOS 62 06/18/2024    ALT 26 06/18/2024    AST 30 06/18/2024    GLUCOSE 94 06/18/2024       Lab Results   Component Value Date    CHOL 162 06/18/2024    LDLCALC 80 06/18/2024    TRIG 99 06/18/2024    HDL 62.0 06/18/2024        A1c: 5.5 (6/2024) down from 5.9    Anthropometrics  Ht Readings from Last 1 Encounters:   09/09/24 1.727 m (5' 8\")       BMI Readings from Last 1 Encounters:   09/09/24 29.80 kg/m²       Wt Readings from Last 10 Encounters:   09/09/24 88.9 kg (196 lb)   06/26/24 91.2 kg (201 lb)   03/28/24 91.6 kg (202 lb)   03/22/24 94.8 kg (209 lb)   02/09/24 95.7 kg (211 lb)   01/23/24 92.1 kg (203 lb)   12/26/23 93.4 kg (206 lb)   08/04/23 92.5 kg (204 lb)   07/10/23 92.1 kg (203 lb)   01/27/23 91.2 kg (201 lb)       Weight change:     195 lbs on home scale    Visit Summary  Adwoa reports she is now on 5mg Mounjaro, no side effects noted. Appetite is decreased, sometimes she has a later lunch and skips dinner and wakes up hungry during the night. She is going to try to have something small in the evening to prevent this. She is continuing to be consistent with water intake, healthy meals, consistent protein intake. She challenges herself with step count.       Nutrition Diagnosis:    Diagnosis Status: Ongoing  Diagnosis : Unintended weight gain related to excessive energy intake as evidenced by weight gain    Nutrition Goals    Avoid skipping dinner, have something small that includes protein if appetite is low. "     Follow up Plan  Will follow-up- scheduled for 11/18

## 2024-10-01 NOTE — H&P
History Of Present Illness  Elsa Castellano is a 73 y.o. female presenting with hepatic lesions that have increased in size in past 2years. Currently measures 4.2 cm, prior measurement 2.6 cm. PMH includes  liver adenoma, HTN, HLD, prediabetes,     MRI Liver  IMPRESSION:  1. Diffuse hepatic steatosis with lateral segment 2 hepatic  progressively enhancing solid lesion measuring currently 4.2 x 2.9  cm. This lesion has significantly increased in size since CT scan  dated 08/28/2022 as it was measuring 2.6 x 2.0 cm. The growth,  patient's age/demographic coupled with the absence of classic signal  intensity of benign hepatic lesions including hemangioma would raise  the concern for malignant process including mass forming  cholangiocarcinoma or alternatively metastatic lesions.  2. 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 No duct dilatation. Please see below  guidelines for suggested follow-up  3. Known intestinal malrotation without obstruction      Past Medical History:  Past Medical History:   Diagnosis Date    Acute non-ST segment elevation myocardial infarction (Multi) 08/28/2022    Anxiety 12/26/2023    Chest pain 09/08/2023    Chronic obstructive pulmonary disease (Multi) 09/08/2023    Elevated liver enzymes 09/08/2023    Hepatomegaly 08/14/2023    History of hypertension 12/30/2023    Hyperlipidemia 09/08/2023    Macrocytosis without anemia 09/08/2023    Palpitations 09/08/2023    Personal history of other diseases of the circulatory system     History of hypertension    Personal history of other diseases of the respiratory system     History of chronic obstructive lung disease    Prediabetes 01/27/2023    Rhinitis 12/30/2023    Shortness of breath 09/08/2023        Past Surgical History:  Past Surgical History:   Procedure Laterality Date    CARDIAC CATHETERIZATION  2022    x2    CATARACT EXTRACTION Bilateral 2014    COLONOSCOPY  2008    COLONOSCOPY  2013     COLONOSCOPY  2018    DILATION AND CURETTAGE OF UTERUS  2012    VAGINA SURGERY  1974          Social History:   reports that she has quit smoking. Her smoking use included cigarettes. She has been exposed to tobacco smoke. She has never used smokeless tobacco. She reports current alcohol use of about 10.0 standard drinks of alcohol per week. She reports that she does not use drugs.     Family History:  Family History   Problem Relation Name Age of Onset    Stroke Father      Heart attack Father      No Known Problems Brother      Breast cancer Paternal Grandmother      Breast cancer Father's Sister          Allergies:  Allergies   Allergen Reactions    Penicillins Unknown, Anaphylaxis and Swelling    Hydrochlorothiazide Dizziness        Home Medications:  Current Outpatient Medications   Medication Instructions    albuterol (Proventil HFA) 90 mcg/actuation inhaler 2 puffs, inhalation, Every 6 hours PRN    amLODIPine (NORVASC) 10 mg, oral, Daily    aspirin (Adult Low Dose Aspirin) 81 mg EC tablet 1 tablet, oral, Daily    atorvastatin (LIPITOR) 40 mg, oral, Daily    metoprolol succinate XL (TOPROL-XL) 50 mg, oral, Daily    Mounjaro 5 mg, abdominal subcutaneous, Weekly    umeclidinium (Incruse Ellipta) 62.5 mcg/actuation inhalation inhalation       Inpatient Medications:  Scheduled medications   Medication Dose Route Frequency     PRN medications   Medication     Continuous Medications   Medication Dose Last Rate    dextrose 5%-0.45 % sodium chloride  100 mL/hr           Review of Systems   Constitutional: Negative.    HENT: Negative.     Eyes: Negative.    Respiratory: Negative.     Cardiovascular: Negative.    Gastrointestinal: Negative.    Endocrine: Negative.    Genitourinary: Negative.    Musculoskeletal: Negative.    Skin: Negative.    Allergic/Immunologic: Negative.    Neurological: Negative.    Hematological: Negative.    Psychiatric/Behavioral: Negative.            Physical Exam  Constitutional:       General:  She is awake. She is not in acute distress.     Appearance: She is not ill-appearing.   HENT:      Head: Normocephalic and atraumatic.      Mouth/Throat:      Mouth: Mucous membranes are moist.      Pharynx: Oropharynx is clear.   Cardiovascular:      Rate and Rhythm: Normal rate and regular rhythm.      Pulses:           Radial pulses are 2+ on the right side and 2+ on the left side.        Dorsalis pedis pulses are 2+ on the right side and 2+ on the left side.      Heart sounds: Normal heart sounds. No murmur heard.  Pulmonary:      Effort: Pulmonary effort is normal.      Breath sounds: Normal breath sounds.   Abdominal:      General: Bowel sounds are normal.      Palpations: Abdomen is soft.   Musculoskeletal:         General: Normal range of motion.      Cervical back: Normal range of motion.      Right lower leg: No edema.      Left lower leg: No edema.   Skin:     General: Skin is warm and dry.      Capillary Refill: Capillary refill takes less than 2 seconds.   Neurological:      General: No focal deficit present.      Mental Status: She is alert and oriented to person, place, and time. Mental status is at baseline.      Cranial Nerves: Cranial nerves 2-12 are intact.   Psychiatric:         Mood and Affect: Mood and affect normal.         Behavior: Behavior normal.        Sedation Plan    ASA 3     Mallampati class: I.           NPO since 2200 on 10/1/2024.     Last Recorded Vitals  Blood pressure 145/75, pulse 74, temperature 36.6 °C (97.8 °F), temperature source Temporal, resp. rate 16, SpO2 100%.         Vitals from the Past 24 Hours  Heart Rate:  [74]   Temp:  [36.6 °C (97.8 °F)]   Resp:  [16]   BP: (145)/(75)   SpO2:  [100 %]          Relevant Results    Labs    CBC:   Recent Labs     10/02/24  0917 02/09/24  1016 08/05/23  1038 01/27/23  0923 08/28/22  0943 07/27/22  0948   WBC 7.8 8.1 6.7 8.0 3.4* 7.1   HGB 15.0 15.4 13.8 15.3* 17.2* 14.8   HCT 44.6 48.7* 43.4 47.2* 53.2* 46.9*    227 186 185  "166 150   MCV 93 98 98.2 96.3 98.0 102.2*     BMP/CMP:   Recent Labs     06/18/24  1122 02/09/24  1016 09/22/23  1134 08/05/23  1038 01/27/23  0923 08/28/22  1158 08/28/22  0943 07/27/22  0948    139  --  140 144  --  134 138   K 4.7 4.6  --  4.8 4.8 3.2* SAMPLE HEMOLYZED TO BE RECOLLECTED 4.8    105  --  108* 107  --  102 104   BUN 19 16  --  16 17  --  15 18   CREATININE 0.90 0.80  --  0.8 0.8  --  1.1 0.9   CO2 21* 20*  --  20* 21*  --  18* 21*   CALCIUM 9.8 9.5  --  9.0 9.8  --  9.2 9.4   PROT 7.0 7.2 7.0 6.6 7.4  --   --  6.8   BILITOT 0.5 0.5 0.5 0.5 0.4  --   --  0.7   ALKPHOS 62 62 60 60 68  --   --  52   ALT 26 57* 23 19 21  --   --  43*   AST 30 42* 26 21 26  --   --  35   GLUCOSE 94 88  --  100* 98  --  149* 125*      Lipid Panel:   Recent Labs     06/18/24  1122 02/09/24  1016 08/05/23  1038 01/27/23  0923 07/27/22  0948 01/21/22  1046 07/17/21  1004 01/14/21  1044 06/26/20  1118 12/30/19  1022 04/26/19  0933 10/12/18  1119   CHOL 162 173 158 216* 200 199 223* 197 195 202* 194 232*   HDL 62.0 57.0 57 60 62 62 84 62 70 64 67 72   CHHDL 2.6 3.0 2.8 3.6 3.2 3.2 2.7 3.2 2.8 3.2 2.9 3.2   TRIG 99 170* 112 181* 186* 115 179* 125 99 108 120 127     Cardiac       No lab exists for component: \"CK\", \"CKMBP\"   Hemoglobin A1C:   Recent Labs     06/18/24  1122 02/09/24  1016 01/27/23  0923   HGBA1C 5.5 5.9* 5.6     TSH/ Free T4:   Recent Labs     02/09/24  1016 07/27/22  0948 01/14/21  1044 12/30/19  1022 10/12/18  1119   TSH 2.86 4.04 2.62 2.68 1.53         STUDY:  MR LIVER W AND WO IV CONTRAST;  9/9/2024 11:23 am      INDICATION:  Signs/Symptoms:benign neoplasm of liver.      ,D13.4 Benign neoplasm of liver      COMPARISON:  Multiple studies dated back to 2021      ACCESSION NUMBER(S):  [id number]      ORDERING CLINICIAN:  KINJAL LOCK      TECHNIQUE:  MRI LIVER; Multiplanar magnetic resonance images of the abdomen were  obtained including the following sequences; T2-weighted SSFSE with  and without " fat saturation, T1-weighted GRE in/opposed phase, DWI,  fat saturated 3D-T1w GRE pre and dynamically post contrast.  18 ML of  Dotarem was administered intravenously without immediate complication.      FINDINGS:  LIVER:  Normal size. Normal contour. Diffuse steatosis.      Lateral segment 2 lesion measuring 4.2 x 2.9 cm that shows  progressive fill in of contrast (series 9, image 16/series 12, image  19). No corresponding hyperintense signal in T2 weighted image to  suggest hemangioma.      BILE DUCTS:  No intrahepatic or extrahepatic bile duct dilatation is demonstrated.      GALLBLADDER:  Within normal limits.      PANCREAS:  Normal signal intensity. Normal enhancement. No solid masses.  Multiple tiny pancreatic cystic foci mostly in the region of the  head. The pancreatic duct is normal.      SPLEEN:  The spleen is normal in size without evidence of focal lesions.      ADRENAL GLANDS:  Within normal limits.      KIDNEYS:  No right-sided hydronephrosis. Left kidney is not visualized.      LYMPH NODES:  No lymphadenopathy.      ABDOMINAL VESSELS:  No aneurysmal dilatation of the abdominal aorta.      BOWEL:  Evidence of malrotation without obstruction.      PERITONEUM/RETROPERITONEUM:  No ascites.      BONES AND LOWER THORAX:  Multilevel degenerative spine changes and facet joint disease. The  visualized lower lung fields are unremarkable. The heart is normal in  size.      IMPRESSION:  1. Diffuse hepatic steatosis with lateral segment 2 hepatic  progressively enhancing solid lesion measuring currently 4.2 x 2.9  cm. This lesion has significantly increased in size since CT scan  dated 08/28/2022 as it was measuring 2.6 x 2.0 cm. The growth,  patient's age/demographic coupled with the absence of classic signal  intensity of benign hepatic lesions including hemangioma would raise  the concern for malignant process including mass forming  cholangiocarcinoma or alternatively metastatic lesions.  2. 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 No duct dilatation. Please see below  guidelines for suggested follow-up  3. Known intestinal malrotation without obstruction      Recommendation:  Hepatobiliary surgery/service referral.  Consider tissue sampling.      MACRO:  Incidental Finding:  There is a small cystic lesion noted within the  pancreas measuring less than 15 mm.  (**-YCF-**)      Instructions:  Follow-up contrast enhanced MRI or pancreas protocol  CT every two years up to 10 years or more frequent imaging versus  EUS/FNA in case of interval growth. (Debora AJ, Karel ME, Jericho DE,  et al. Management of Incidental Pancreatic Cysts: A White Paper of  the ACR Incidental Findings Committee. J Am Jeremy Radiol.  2017;14(7):911-923.) [email or web address]      Signed by: Shad Sanchez 9/9/2024 3:11 PM  Dictation workstation:   [id number]        Past Cardiology Tests (Last 3 Years):    EKG:  No results for input(s): \"ATRRATE\", \"VENTRATE\", \"PRINT\", \"QRSDUR\", \"QTCFRED\", \"QTCCALCB\" in the last 56416 hours.No results found for this or any previous visit (from the past 4464 hour(s)).  Echo:  Echocardiogram: No results found for this or any previous visit from the past 1800 days.    Ejection Fractions:  No results found for: \"EF\"  Cath:  Coronary Angiography:   ADULT CATH     Narrative  Ordered by an unspecified provider.    Right Heart Cath: No results found for this or any previous visit from the past 1800 days.    Stress Test:  Nuclear:No results found for this or any previous visit from the past 1800 days.    Metabolic Stress: No results found for this or any previous visit from the past 1800 days.    Cardiac Imaging:  Cardiac Scoring:   CT HEART CALCIUM SCORING WO IV CONTRAST 04/15/2021    Narrative  MRN: [id number]  Patient Name: MAICO STANLEY    STUDY:  CT CARDIAC SCORING;  4/15/2021 2:45 pm    INDICATION:  I20.8 Other forms of angina pectoris.    COMPARISON:  None.    ACCESSION " NUMBER(S):  [id number]    ORDERING CLINICIAN:  KIA FRIAS    TECHNIQUE:  Using prospective ECG gating, limited CT scan of the coronary  arteries was performed without intravenous contrast. Coronary calcium  scoring  was performed according to the method of Agatston.    FINDINGS:  The score and distribution of calcium in the coronary arteries is as  follows:    LM: 0.  LAD: 185.4.  LCx: 0.  RCA: 0.    Total: 185.4.    The visualized segments of the lungs are normally expanded. Patchy  bibasilar infiltrates and/or atelectasis. Elevated right  hemidiaphragm.    The visualized mid/lower ascending thoracic aorta measures 3.4 cm in  diameter.  There are some atherosclerotic calcifications visualized  descending thoracic aorta.    The heart is borderline enlarged. Trace pericardial effusion.    No gross evidence of mediastinal or hilar lymphadenopathy is  identified.    Small to moderate hiatal hernia. Wall thickening visualized distal  esophagus. Diffuse fatty liver. Focus relative increased attenuation  in the left hepatic lobe measuring 2.5 cm.    Impression  1. Coronary artery calcium score of 185.4 *. This corresponds to the  66th percentile for females age .  2. Patchy bibasilar infiltrates and/or atelectasis. Elevated right  hemidiaphragm.  3. Small to moderate hiatal hernia and wall thickening visualized  distal esophagus for which correlation with endoscopy is recommended.  4. Diffuse fatty liver. Focus of relative increased attenuation left  hepatic lobe up to 2.5 cm could represent focal fatty sparing or  other lesion. Suggest correlation with ultrasound or MRI for further  assessment.  5. Additional findings as above.    *Coronary artery calcium scoring may be helpful in predicting the  risk for future coronary heart disease events.  According to the  American College of Cardiology Foundation Clinical Expert Consensus  Task Force, such testing provides important prognostic information in  patients with  "more than one coronary heart disease risk factor. The  coronary artery calcium score correlates with the annual risk of a  non-fatal myocardial infarction or coronary heart disease death.    Coronary artery score            Annual Risk    0-99                             0.4%  100-399                        1.3%  >400                            2.4%    These three \"breakpoints\" correspond to lower, intermediate and high  risk states for future coronary events.  Such information should be  used, along with appropriate clinical judgment, to make decisions  regarding the intensity of risk factor management strategies to treat  blood lipids and to modify other non-lipid coronary risk factors.    Reference: [place] P et al. Circulation.  2007; 115:402-426    Cardiac MRI: No results found for this or any previous visit from the past 1800 days.         Assessment/Plan  Assessment/Plan   Active Problems:  There are no active Hospital Problems.    Liver lesions    Here for CT guided liver biopsy with Dr. Sexton on 10/2/2024.                  I spent 35 minutes in the professional and overall care of this patient.      Shiloh Shepard, APRN-CNP    "

## 2024-10-02 ENCOUNTER — HOSPITAL ENCOUNTER (OUTPATIENT)
Dept: RADIOLOGY | Facility: HOSPITAL | Age: 74
Discharge: HOME | End: 2024-10-02
Payer: MEDICARE

## 2024-10-02 VITALS
RESPIRATION RATE: 16 BRPM | HEART RATE: 70 BPM | DIASTOLIC BLOOD PRESSURE: 79 MMHG | OXYGEN SATURATION: 96 % | SYSTOLIC BLOOD PRESSURE: 133 MMHG

## 2024-10-02 VITALS
HEART RATE: 66 BPM | OXYGEN SATURATION: 94 % | DIASTOLIC BLOOD PRESSURE: 69 MMHG | SYSTOLIC BLOOD PRESSURE: 119 MMHG | RESPIRATION RATE: 16 BRPM | TEMPERATURE: 97.8 F

## 2024-10-02 DIAGNOSIS — R16.0 LIVER MASS, LEFT LOBE: ICD-10-CM

## 2024-10-02 LAB
ERYTHROCYTE [DISTWIDTH] IN BLOOD BY AUTOMATED COUNT: 13.6 % (ref 11.5–14.5)
HCT VFR BLD AUTO: 44.6 % (ref 36–46)
HGB BLD-MCNC: 15 G/DL (ref 12–16)
INR PPP: 1 (ref 0.9–1.2)
MCH RBC QN AUTO: 31.3 PG (ref 26–34)
MCHC RBC AUTO-ENTMCNC: 33.6 G/DL (ref 32–36)
MCV RBC AUTO: 93 FL (ref 80–100)
NRBC BLD-RTO: 0 /100 WBCS (ref 0–0)
PLATELET # BLD AUTO: 180 X10*3/UL (ref 150–450)
PROTHROMBIN TIME: 10.3 SECONDS (ref 9.3–12.7)
RBC # BLD AUTO: 4.8 X10*6/UL (ref 4–5.2)
WBC # BLD AUTO: 7.8 X10*3/UL (ref 4.4–11.3)

## 2024-10-02 PROCEDURE — 99153 MOD SED SAME PHYS/QHP EA: CPT | Performed by: STUDENT IN AN ORGANIZED HEALTH CARE EDUCATION/TRAINING PROGRAM

## 2024-10-02 PROCEDURE — 47000 NEEDLE BIOPSY OF LIVER PERQ: CPT | Performed by: STUDENT IN AN ORGANIZED HEALTH CARE EDUCATION/TRAINING PROGRAM

## 2024-10-02 PROCEDURE — 76942 ECHO GUIDE FOR BIOPSY: CPT | Performed by: STUDENT IN AN ORGANIZED HEALTH CARE EDUCATION/TRAINING PROGRAM

## 2024-10-02 PROCEDURE — 99152 MOD SED SAME PHYS/QHP 5/>YRS: CPT | Performed by: STUDENT IN AN ORGANIZED HEALTH CARE EDUCATION/TRAINING PROGRAM

## 2024-10-02 PROCEDURE — 7100000009 HC PHASE TWO TIME - INITIAL BASE CHARGE

## 2024-10-02 PROCEDURE — 76942 ECHO GUIDE FOR BIOPSY: CPT

## 2024-10-02 PROCEDURE — 99153 MOD SED SAME PHYS/QHP EA: CPT

## 2024-10-02 PROCEDURE — 85610 PROTHROMBIN TIME: CPT | Performed by: NURSE PRACTITIONER

## 2024-10-02 PROCEDURE — 7100000010 HC PHASE TWO TIME - EACH INCREMENTAL 1 MINUTE

## 2024-10-02 PROCEDURE — 85027 COMPLETE CBC AUTOMATED: CPT | Performed by: NURSE PRACTITIONER

## 2024-10-02 PROCEDURE — 99152 MOD SED SAME PHYS/QHP 5/>YRS: CPT

## 2024-10-02 PROCEDURE — 2720000007 HC OR 272 NO HCPCS

## 2024-10-02 PROCEDURE — 36415 COLL VENOUS BLD VENIPUNCTURE: CPT | Performed by: NURSE PRACTITIONER

## 2024-10-02 PROCEDURE — 2500000004 HC RX 250 GENERAL PHARMACY W/ HCPCS (ALT 636 FOR OP/ED): Performed by: STUDENT IN AN ORGANIZED HEALTH CARE EDUCATION/TRAINING PROGRAM

## 2024-10-02 PROCEDURE — 77012 CT SCAN FOR NEEDLE BIOPSY: CPT

## 2024-10-02 RX ORDER — LIDOCAINE HYDROCHLORIDE 10 MG/ML
INJECTION, SOLUTION EPIDURAL; INFILTRATION; INTRACAUDAL; PERINEURAL
Status: COMPLETED | OUTPATIENT
Start: 2024-10-02 | End: 2024-10-02

## 2024-10-02 RX ORDER — DEXTROSE MONOHYDRATE AND SODIUM CHLORIDE 5; .45 G/100ML; G/100ML
100 INJECTION, SOLUTION INTRAVENOUS CONTINUOUS
Status: DISCONTINUED | OUTPATIENT
Start: 2024-10-02 | End: 2024-10-02

## 2024-10-02 RX ORDER — FENTANYL CITRATE 50 UG/ML
INJECTION, SOLUTION INTRAMUSCULAR; INTRAVENOUS
Status: COMPLETED | OUTPATIENT
Start: 2024-10-02 | End: 2024-10-02

## 2024-10-02 RX ORDER — MIDAZOLAM HYDROCHLORIDE 1 MG/ML
INJECTION, SOLUTION INTRAMUSCULAR; INTRAVENOUS
Status: COMPLETED | OUTPATIENT
Start: 2024-10-02 | End: 2024-10-02

## 2024-10-02 ASSESSMENT — PAIN SCALES - GENERAL
PAINLEVEL_OUTOF10: 8
PAINLEVEL_OUTOF10: 0 - NO PAIN
PAINLEVEL_OUTOF10: 8
PAINLEVEL_OUTOF10: 0 - NO PAIN
PAINLEVEL_OUTOF10: 0 - NO PAIN
PAINLEVEL_OUTOF10: 5 - MODERATE PAIN
PAINLEVEL_OUTOF10: 8
PAINLEVEL_OUTOF10: 0 - NO PAIN
PAINLEVEL_OUTOF10: 0 - NO PAIN
PAINLEVEL_OUTOF10: 5 - MODERATE PAIN
PAINLEVEL_OUTOF10: 8
PAINLEVEL_OUTOF10: 5 - MODERATE PAIN

## 2024-10-02 ASSESSMENT — PAIN - FUNCTIONAL ASSESSMENT
PAIN_FUNCTIONAL_ASSESSMENT: 0-10

## 2024-10-02 ASSESSMENT — ENCOUNTER SYMPTOMS
ENDOCRINE NEGATIVE: 1
GASTROINTESTINAL NEGATIVE: 1
PSYCHIATRIC NEGATIVE: 1
RESPIRATORY NEGATIVE: 1
ALLERGIC/IMMUNOLOGIC NEGATIVE: 1
MUSCULOSKELETAL NEGATIVE: 1
CONSTITUTIONAL NEGATIVE: 1
EYES NEGATIVE: 1
HEMATOLOGIC/LYMPHATIC NEGATIVE: 1
NEUROLOGICAL NEGATIVE: 1
CARDIOVASCULAR NEGATIVE: 1

## 2024-10-02 ASSESSMENT — COLUMBIA-SUICIDE SEVERITY RATING SCALE - C-SSRS
2. HAVE YOU ACTUALLY HAD ANY THOUGHTS OF KILLING YOURSELF?: NO
6. HAVE YOU EVER DONE ANYTHING, STARTED TO DO ANYTHING, OR PREPARED TO DO ANYTHING TO END YOUR LIFE?: NO
1. IN THE PAST MONTH, HAVE YOU WISHED YOU WERE DEAD OR WISHED YOU COULD GO TO SLEEP AND NOT WAKE UP?: NO

## 2024-10-02 NOTE — DISCHARGE INSTRUCTIONS
No NSAIDS (ibuprofen, naproxen, aleve, advil) or aspirin for 48 hours after procedure. You may take tylenol if needed but do not exceed 10 tablets in 24 hour period       Follow these Instructions for a Safer Recovery:  Activity:  ? Rest by sitting up for 4 to 6 hours.  ? Limited activity for 24 hours after the test.  ? No driving for 24 hours. You will need someone to drive you home.  ? Do not do any heavy lifting, such as groceries or toddlers, for 48 hours.  ? Avoid intense exercise and contact sports for 48 hours.  Diet:  ? You may resume your normal diet.  Medicines:  ? If you take blood-thinning medications.  ? You may take your other medicines as ordered by your doctor.  Call your Doctor if you have:  ? Redness, swelling or pus-like drainage at the biopsy site.  ? Fever over 100.4 F or higher.  ? Pain or tenderness at the biopsy site that gets worst after the procedure.  ? Any Questions.  Call your Doctor right away, if you have any of these signs:  ? Bleeding from the biopsy site.  ? Shortness of breath or trouble breathing.  ? Increase in pain in the procedure site.  ? Dizziness or fainting.  If you are not able to contact your doctor, call 911 and go to the nearest Emergency Room.   How to Reach your Doctor:    Call Dr. Alexandra at [phone] with problems or questions.  This info is a general resource. It is not meant to replace your health care provider’s advice.  Ask your doctor or health care team any questions. Always follow their instructions.

## 2024-10-02 NOTE — Clinical Note
Pt able to move onto the cart on her own.  Pt sstates her stomach feels like somebody punched it.  Rates the pain at an 8.  Dr Sexton is aware

## 2024-10-02 NOTE — PRE-PROCEDURE NOTE
Interventional Radiology Preprocedure Note    Indication for procedure: The encounter diagnosis was Liver mass, left lobe.    Relevant review of systems: NA    Relevant Labs:   Lab Results   Component Value Date    CREATININE 0.90 06/18/2024    EGFR 67 06/18/2024    INR 1.0 10/02/2024    PROTIME 10.3 10/02/2024       Planned Sedation/Anesthesia: Moderate    Airway assessment: normal    Directed physical examination:    GENERAL: awake, no acute distress  HEENT: AT/NC  NECK: supple  CV: RRR  PULM: non-labored breathing  ABDOMEN: soft, ND  NEURO: AAOx3  MSK: full ROM  SKIN: no rash      Mallampati: III (soft and hard palate and base of uvula visible)    ASA Score: ASA 3 - Patient with moderate systemic disease with functional limitations    Benefits, risks and alternatives of procedure and planned sedation have been discussed with the patient and/or their representative. All questions answered and they agree to proceed.

## 2024-10-02 NOTE — Clinical Note
"Pt states the pain is an '8\" but she is calm and not in any distress.  Explained to patient that we will hold off on any pain medicine for now and see how she does and see if the pain gets any worse.  Pt states she is okay with that"

## 2024-10-02 NOTE — POST-PROCEDURE NOTE
Interventional Radiology Brief Postprocedure Note    Attending: Ketan Sexton    Diagnosis: Left hepatic lobe lesion    Description of procedure: CT and US guided 18G core needle biopsy of the left hepatic lobe lesion x2     Anesthesia:  MAC    Complications: None    Estimated Blood Loss: minimal    Medications (Filter: Administrations occurring from 0958 to 1107 on 10/02/24) As of 10/02/24 1107      midazolam (Versed) injection (mg) Total dose:  2 mg      Date/Time Rate/Dose/Volume Action       10/02/24  1014 1 mg Given      1028 1 mg Given               fentaNYL PF (Sublimaze) injection (mcg) Total dose:  100 mcg      Date/Time Rate/Dose/Volume Action       10/02/24  1014 50 mcg Given      1028 50 mcg Given               lidocaine PF (Xylocaine) 10 mg/mL (1 %) injection (mL) Total volume:  7 mL      Date/Time Rate/Dose/Volume Action       10/02/24  1020 7 mL Given                   ID Type Source Tests Collected by Time   1 : ct and ultrasound guided liver mass biopsy left Tissue LIVER MASS BIOPSY LEFT SURGICAL PATHOLOGY EXAM Pernell Sexton MD 10/2/2024 1103         See detailed result report with images in PACS.    The patient tolerated the procedure well without incident or complication and is in stable condition.

## 2024-10-10 LAB
LAB AP ASR DISCLAIMER: NORMAL
LABORATORY COMMENT REPORT: NORMAL
PATH REPORT.FINAL DX SPEC: NORMAL
PATH REPORT.GROSS SPEC: NORMAL
PATH REPORT.RELEVANT HX SPEC: NORMAL
PATH REPORT.TOTAL CANCER: NORMAL

## 2024-10-11 ENCOUNTER — TELEPHONE (OUTPATIENT)
Dept: GASTROENTEROLOGY | Facility: HOSPITAL | Age: 74
End: 2024-10-11

## 2024-10-11 DIAGNOSIS — C22.9 ADENOCARCINOMA DETERMINED BY BIOPSY OF LIVER (MULTI): Primary | ICD-10-CM

## 2024-10-14 LAB
LAB AP ASR DISCLAIMER: NORMAL
LABORATORY COMMENT REPORT: NORMAL
PATH REPORT.ADDENDUM SPEC: NORMAL
PATH REPORT.FINAL DX SPEC: NORMAL
PATH REPORT.GROSS SPEC: NORMAL
PATH REPORT.RELEVANT HX SPEC: NORMAL
PATH REPORT.TOTAL CANCER: NORMAL

## 2024-10-17 ENCOUNTER — PATIENT OUTREACH (OUTPATIENT)
Dept: HEMATOLOGY/ONCOLOGY | Facility: CLINIC | Age: 74
End: 2024-10-17
Payer: COMMERCIAL

## 2024-10-17 SDOH — ECONOMIC STABILITY: TRANSPORTATION INSECURITY
IN THE PAST 12 MONTHS, HAS LACK OF TRANSPORTATION KEPT YOU FROM MEETINGS, WORK, OR FROM GETTING THINGS NEEDED FOR DAILY LIVING?: NO

## 2024-10-17 SDOH — ECONOMIC STABILITY: GENERAL
WHICH OF THE FOLLOWING DO YOU KNOW HOW TO USE AND HAVE ACCESS TO EVERY DAY? (CHOOSE ALL THAT APPLY): DESKTOP COMPUTER, LAPTOP COMPUTER, OR TABLET WITH BROADBAND INTERNET CONNECTION;SMARTPHONE WITH CELLULAR DATA PLAN

## 2024-10-17 SDOH — ECONOMIC STABILITY: TRANSPORTATION INSECURITY
IN THE PAST 12 MONTHS, HAS THE LACK OF TRANSPORTATION KEPT YOU FROM MEDICAL APPOINTMENTS OR FROM GETTING MEDICATIONS?: NO

## 2024-10-17 SDOH — ECONOMIC STABILITY: FOOD INSECURITY: WITHIN THE PAST 12 MONTHS, THE FOOD YOU BOUGHT JUST DIDN'T LAST AND YOU DIDN'T HAVE MONEY TO GET MORE.: NEVER TRUE

## 2024-10-17 SDOH — ECONOMIC STABILITY: INCOME INSECURITY: IN THE LAST 12 MONTHS, WAS THERE A TIME WHEN YOU WERE NOT ABLE TO PAY THE MORTGAGE OR RENT ON TIME?: NO

## 2024-10-17 SDOH — ECONOMIC STABILITY: GENERAL
WHICH OF THE FOLLOWING WOULD YOU LIKE TO GET CONNECTED TO IN ORDER TO RECEIVE A DISCOUNT OR FOR FREE? (CHOOSE ALL THAT APPLY): NONE OF THESE

## 2024-10-17 SDOH — ECONOMIC STABILITY: FOOD INSECURITY: WITHIN THE PAST 12 MONTHS, YOU WORRIED THAT YOUR FOOD WOULD RUN OUT BEFORE YOU GOT MONEY TO BUY MORE.: NEVER TRUE

## 2024-10-17 SDOH — HEALTH STABILITY: PHYSICAL HEALTH: ON AVERAGE, HOW MANY MINUTES DO YOU ENGAGE IN EXERCISE AT THIS LEVEL?: 0 MIN

## 2024-10-17 SDOH — HEALTH STABILITY: PHYSICAL HEALTH: ON AVERAGE, HOW MANY DAYS PER WEEK DO YOU ENGAGE IN MODERATE TO STRENUOUS EXERCISE (LIKE A BRISK WALK)?: 0 DAYS

## 2024-10-17 ASSESSMENT — SOCIAL DETERMINANTS OF HEALTH (SDOH)
HOW OFTEN DO YOU ATTEND CHURCH OR RELIGIOUS SERVICES?: MORE THAN 4 TIMES PER YEAR
DO YOU BELONG TO ANY CLUBS OR ORGANIZATIONS SUCH AS CHURCH GROUPS UNIONS, FRATERNAL OR ATHLETIC GROUPS, OR SCHOOL GROUPS?: NO
WITHIN THE LAST YEAR, HAVE YOU BEEN AFRAID OF YOUR PARTNER OR EX-PARTNER?: NO
IN THE PAST 12 MONTHS, HAS THE ELECTRIC, GAS, OIL, OR WATER COMPANY THREATENED TO SHUT OFF SERVICE IN YOUR HOME?: NO
HOW OFTEN DO YOU ATTENT MEETINGS OF THE CLUB OR ORGANIZATION YOU BELONG TO?: NEVER
WITHIN THE LAST YEAR, HAVE YOU BEEN KICKED, HIT, SLAPPED, OR OTHERWISE PHYSICALLY HURT BY YOUR PARTNER OR EX-PARTNER?: NO
IN A TYPICAL WEEK, HOW MANY TIMES DO YOU TALK ON THE PHONE WITH FAMILY, FRIENDS, OR NEIGHBORS?: THREE TIMES A WEEK
HOW OFTEN DO YOU GET TOGETHER WITH FRIENDS OR RELATIVES?: MORE THAN THREE TIMES A WEEK
WITHIN THE LAST YEAR, HAVE YOU BEEN HUMILIATED OR EMOTIONALLY ABUSED IN OTHER WAYS BY YOUR PARTNER OR EX-PARTNER?: NO
HOW HARD IS IT FOR YOU TO PAY FOR THE VERY BASICS LIKE FOOD, HOUSING, MEDICAL CARE, AND HEATING?: NOT HARD AT ALL
WITHIN THE LAST YEAR, HAVE TO BEEN RAPED OR FORCED TO HAVE ANY KIND OF SEXUAL ACTIVITY BY YOUR PARTNER OR EX-PARTNER?: NO

## 2024-10-17 NOTE — PROGRESS NOTES
Called and spoke to Elsa. No questions at this time. Reminded of NPV with Dr. Castro on 10/21 at 10 am.

## 2024-10-21 ENCOUNTER — OFFICE VISIT (OUTPATIENT)
Dept: HEMATOLOGY/ONCOLOGY | Facility: CLINIC | Age: 74
End: 2024-10-21
Payer: MEDICARE

## 2024-10-21 VITALS
HEART RATE: 88 BPM | DIASTOLIC BLOOD PRESSURE: 80 MMHG | BODY MASS INDEX: 30.88 KG/M2 | TEMPERATURE: 96.2 F | OXYGEN SATURATION: 95 % | HEIGHT: 67 IN | RESPIRATION RATE: 17 BRPM | SYSTOLIC BLOOD PRESSURE: 134 MMHG | WEIGHT: 196.76 LBS

## 2024-10-21 DIAGNOSIS — C24.0: Primary | ICD-10-CM

## 2024-10-21 DIAGNOSIS — C78.7: ICD-10-CM

## 2024-10-21 DIAGNOSIS — C18.9: ICD-10-CM

## 2024-10-21 DIAGNOSIS — C22.9 ADENOCARCINOMA DETERMINED BY BIOPSY OF LIVER (MULTI): ICD-10-CM

## 2024-10-21 LAB
AFP SERPL-MCNC: 5 NG/ML (ref 0–9)
ALBUMIN SERPL BCP-MCNC: 4.4 G/DL (ref 3.4–5)
ALP SERPL-CCNC: 49 U/L (ref 33–136)
ALT SERPL W P-5'-P-CCNC: 34 U/L (ref 7–45)
ANION GAP SERPL CALC-SCNC: 15 MMOL/L (ref 10–20)
AST SERPL W P-5'-P-CCNC: 29 U/L (ref 9–39)
BASOPHILS # BLD AUTO: 0.06 X10*3/UL (ref 0–0.1)
BASOPHILS NFR BLD AUTO: 0.8 %
BILIRUB SERPL-MCNC: 0.6 MG/DL (ref 0–1.2)
BUN SERPL-MCNC: 17 MG/DL (ref 6–23)
CALCIUM SERPL-MCNC: 9.4 MG/DL (ref 8.6–10.3)
CANCER AG19-9 SERPL-ACNC: 47.24 U/ML
CEA SERPL-MCNC: 2.7 UG/L
CHLORIDE SERPL-SCNC: 104 MMOL/L (ref 98–107)
CO2 SERPL-SCNC: 21 MMOL/L (ref 21–32)
CREAT SERPL-MCNC: 0.83 MG/DL (ref 0.5–1.05)
EGFRCR SERPLBLD CKD-EPI 2021: 74 ML/MIN/1.73M*2
EOSINOPHIL # BLD AUTO: 0.36 X10*3/UL (ref 0–0.4)
EOSINOPHIL NFR BLD AUTO: 4.6 %
ERYTHROCYTE [DISTWIDTH] IN BLOOD BY AUTOMATED COUNT: 13.7 % (ref 11.5–14.5)
GLUCOSE SERPL-MCNC: 116 MG/DL (ref 74–99)
HCT VFR BLD AUTO: 43.3 % (ref 36–46)
HGB BLD-MCNC: 14.4 G/DL (ref 12–16)
IMM GRANULOCYTES # BLD AUTO: 0.03 X10*3/UL (ref 0–0.5)
IMM GRANULOCYTES NFR BLD AUTO: 0.4 % (ref 0–0.9)
LYMPHOCYTES # BLD AUTO: 2.3 X10*3/UL (ref 0.8–3)
LYMPHOCYTES NFR BLD AUTO: 29.3 %
MCH RBC QN AUTO: 31 PG (ref 26–34)
MCHC RBC AUTO-ENTMCNC: 33.3 G/DL (ref 32–36)
MCV RBC AUTO: 93 FL (ref 80–100)
MONOCYTES # BLD AUTO: 0.73 X10*3/UL (ref 0.05–0.8)
MONOCYTES NFR BLD AUTO: 9.3 %
NEUTROPHILS # BLD AUTO: 4.37 X10*3/UL (ref 1.6–5.5)
NEUTROPHILS NFR BLD AUTO: 55.6 %
NRBC BLD-RTO: 0 /100 WBCS (ref 0–0)
PLATELET # BLD AUTO: 224 X10*3/UL (ref 150–450)
POTASSIUM SERPL-SCNC: 4 MMOL/L (ref 3.5–5.3)
PROT SERPL-MCNC: 7.1 G/DL (ref 6.4–8.2)
RBC # BLD AUTO: 4.65 X10*6/UL (ref 4–5.2)
SODIUM SERPL-SCNC: 136 MMOL/L (ref 136–145)
WBC # BLD AUTO: 7.9 X10*3/UL (ref 4.4–11.3)

## 2024-10-21 PROCEDURE — 82105 ALPHA-FETOPROTEIN SERUM: CPT | Mod: WESLAB | Performed by: INTERNAL MEDICINE

## 2024-10-21 PROCEDURE — 1159F MED LIST DOCD IN RCRD: CPT | Performed by: INTERNAL MEDICINE

## 2024-10-21 PROCEDURE — 86301 IMMUNOASSAY TUMOR CA 19-9: CPT | Mod: WESLAB | Performed by: INTERNAL MEDICINE

## 2024-10-21 PROCEDURE — 82378 CARCINOEMBRYONIC ANTIGEN: CPT | Mod: WESLAB | Performed by: INTERNAL MEDICINE

## 2024-10-21 PROCEDURE — 99215 OFFICE O/P EST HI 40 MIN: CPT | Performed by: INTERNAL MEDICINE

## 2024-10-21 PROCEDURE — 84075 ASSAY ALKALINE PHOSPHATASE: CPT | Performed by: INTERNAL MEDICINE

## 2024-10-21 PROCEDURE — 1126F AMNT PAIN NOTED NONE PRSNT: CPT | Performed by: INTERNAL MEDICINE

## 2024-10-21 PROCEDURE — 99205 OFFICE O/P NEW HI 60 MIN: CPT | Performed by: INTERNAL MEDICINE

## 2024-10-21 PROCEDURE — 85025 COMPLETE CBC W/AUTO DIFF WBC: CPT | Performed by: INTERNAL MEDICINE

## 2024-10-21 PROCEDURE — 3008F BODY MASS INDEX DOCD: CPT | Performed by: INTERNAL MEDICINE

## 2024-10-21 PROCEDURE — 1036F TOBACCO NON-USER: CPT | Performed by: INTERNAL MEDICINE

## 2024-10-21 ASSESSMENT — PAIN SCALES - GENERAL: PAINLEVEL_OUTOF10: 0-NO PAIN

## 2024-10-21 ASSESSMENT — ENCOUNTER SYMPTOMS
DEPRESSION: 0
OCCASIONAL FEELINGS OF UNSTEADINESS: 0
LOSS OF SENSATION IN FEET: 0

## 2024-10-21 ASSESSMENT — COLUMBIA-SUICIDE SEVERITY RATING SCALE - C-SSRS
6. HAVE YOU EVER DONE ANYTHING, STARTED TO DO ANYTHING, OR PREPARED TO DO ANYTHING TO END YOUR LIFE?: NO
1. IN THE PAST MONTH, HAVE YOU WISHED YOU WERE DEAD OR WISHED YOU COULD GO TO SLEEP AND NOT WAKE UP?: NO
2. HAVE YOU ACTUALLY HAD ANY THOUGHTS OF KILLING YOURSELF?: NO

## 2024-10-21 NOTE — PROGRESS NOTES
"Patient ID: Elsa Castellano is a 73 y.o. female.    Diagnoses: Moderately differentiated adenocarcinoma of the liver    Genomic profile: pMMR, Tempus xT and Guardant 360 NGS testing ordered on 10-.    Assessment and Plan: 73 year old female with a history of pre-DM, HLD, HTN, CAD s/p MI, COPD, congenital single kidney, who presents as a new patient with adenocarcinoma of the liver.    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 We discussed that patient is in need of further staging including a CT CAP and EGD. Patient's most recent Cologuard was reportedly negative (she cannot undergo colonoscopy due to intestinal malrotation). We reviewed that this tumor can be ideally resected and she will be referred to Dr. Burton.     Plan: check CBC, CMP, CEA, , AFP, Tempus and Guardant 360 NGS. CT CAP. Refer to GI for EGD and Dr. Burton.     Follow up plan- 2 weeks to review the above     I have placed all orders as outlined above. I advised the patient to schedule the tests and follow-up appointment as discussed by contacting the  on the way out or calling by phone.    Providers:  Surgeon:   Jitendra:  Brandon:    Chief complaint: Liver lesion    HPI: 73 year old female with a history of pre-DM, HLD, HTN, CAD s/p MI, COPD, congenital single kidney, who presents as a new patient with adenocarcinoma of the liver.    Patient notes that in 2022 she had a \"small heart attack\" and a CT done on 8/28/22 demonstrated a lateral segment 2 lesion measuring 2.6x2cm. A follow up MRI liver performed on 9/15/23 showed this lesion was 2.8cm and read as \"most compatible with adenoma\". A follow up MRI liver perfromed on 9/9/24 demonstrated this lesion had grown to 4.x2x2.29cm. This was biopsied on 10/2/24 showing moderately differentiated adenocarcinoma involving the liver.     Patient notes that she has been in her usual state of health and " denies any complaints. Patient denies any fevers, chills, fatigue, lymphadenopathy, headache, chest pain, dyspnea, cough, n/v/c/d, abd pain, dysuria, rash, changes in weight or appetite.     Past Medical History:   Past Medical History:  08/28/2022: Acute non-ST segment elevation myocardial infarction   (Multi)  12/26/2023: Anxiety  09/08/2023: Chest pain  09/08/2023: Chronic obstructive pulmonary disease (Multi)  09/08/2023: Elevated liver enzymes  08/14/2023: Hepatomegaly  12/30/2023: History of hypertension  09/08/2023: Hyperlipidemia  09/08/2023: Macrocytosis without anemia  09/08/2023: Palpitations  No date: Personal history of other diseases of the circulatory system      Comment:  History of hypertension  No date: Personal history of other diseases of the respiratory system      Comment:  History of chronic obstructive lung disease  01/27/2023: Prediabetes  12/30/2023: Rhinitis  09/08/2023: Shortness of breath   Surgical History:    Past Surgical History:   Procedure Laterality Date    CARDIAC CATHETERIZATION  2022    x2    CATARACT EXTRACTION Bilateral 2014    COLONOSCOPY  2008    COLONOSCOPY  2013    COLONOSCOPY  2018    DILATION AND CURETTAGE OF UTERUS  2012    VAGINA SURGERY  1974      Family History:    Family History   Problem Relation Name Age of Onset    Stroke Father      Heart attack Father      No Known Problems Brother      Breast cancer Paternal Grandmother      Breast cancer Father's Sister       Family Oncology History:    Cancer-related family history includes Breast cancer in her father's sister and paternal grandmother.  Social History:    Social History     Tobacco Use    Smoking status: Former     Types: Cigarettes     Passive exposure: Past    Smokeless tobacco: Never   Vaping Use    Vaping status: Never Used   Substance Use Topics    Alcohol use: Yes     Alcohol/week: 10.0 standard drinks of alcohol     Types: 10 Standard drinks or equivalent per week    Drug use: Never     "    Allergies  Allergies   Allergen Reactions    Penicillins Unknown, Anaphylaxis and Swelling    Hydrochlorothiazide Dizziness        Medications  Current Outpatient Medications   Medication Instructions    albuterol (Proventil HFA) 90 mcg/actuation inhaler 2 puffs, inhalation, Every 6 hours PRN    amLODIPine (NORVASC) 10 mg, oral, Daily    aspirin (Adult Low Dose Aspirin) 81 mg EC tablet 1 tablet, oral, Daily    atorvastatin (LIPITOR) 40 mg, oral, Daily    metoprolol succinate XL (TOPROL-XL) 50 mg, oral, Daily    Mounjaro 5 mg, abdominal subcutaneous, Weekly    umeclidinium (Incruse Ellipta) 62.5 mcg/actuation inhalation inhalation          Objective   VS: /80 (BP Location: Right arm, Patient Position: Sitting, BP Cuff Size: Adult long)   Pulse 88   Temp 35.7 °C (96.2 °F) (Temporal)   Resp 17   Ht 1.695 m (5' 6.73\")   Wt 89.3 kg (196 lb 12.2 oz)   SpO2 95%   BMI 31.07 kg/m²   Weight:   Vitals:    10/21/24 1006   Weight: 89.3 kg (196 lb 12.2 oz)        PHYSICAL EXAMINATION  ECOG performance status- 0  VS:  /80 (BP Location: Right arm, Patient Position: Sitting, BP Cuff Size: Adult long)   Pulse 88   Temp 35.7 °C (96.2 °F) (Temporal)   Resp 17   Ht 1.695 m (5' 6.73\")   Wt 89.3 kg (196 lb 12.2 oz)   SpO2 95%   BMI 31.07 kg/m²   Weight:   Vitals:    10/21/24 1006   Weight: 89.3 kg (196 lb 12.2 oz)       BSA: 2.05 meters squared   Pain Scale: 0    Constitutional: Awake/alert/oriented x3, cooperative and answers questions appropriately.     Eyes: No pallor, conjunctival injection, clear sclera.    Mouth: No ulceration. No thrush.     Head/Neck: Neck supple, no apparent injury, thyroid without mass or tenderness, No JVD, trachea midline, no bruits.     Respiratory/Thorax: Normal breath sounds with bilaterally symmetrical chest expansion. No dullness.      Cardiovascular: No audible murmurs, normal heart sounds. No pericardial rub.     Gastrointestinal: Nondistended, soft, non-tender, no rebound " tenderness or guarding, no masses palpable, no organomegaly, +BS, no bruits. No ascites.     Musculoskeletal: No joint swelling, redness.      Extremities: normal extremities, no cyanosis edema, contusions or wounds, no clubbing.     Lymphatic: No significant lymphadenopathy.     Skin: Warm and dry, no lesions, no rashes.     Labs                         Image   9/9/24 MRI Liver  IMPRESSION:  1. Diffuse hepatic steatosis with lateral segment 2 hepatic  progressively enhancing solid lesion measuring currently 4.2 x 2.9  cm. This lesion has significantly increased in size since CT scan  dated 08/28/2022 as it was measuring 2.6 x 2.0 cm. The growth,  patient's age/demographic coupled with the absence of classic signal  intensity of benign hepatic lesions including hemangioma would raise  the concern for malignant process including mass forming  cholangiocarcinoma or alternatively metastatic lesions.  2. 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 No duct dilatation. Please see below  guidelines for suggested follow-up  3. Known intestinal malrotation without obstruction    This note was created using a voice recognition system ( the Dragon dictation system). Inaccuracies and misspellings are unintentional.     Patient seen with Dr. Alex Spears.  I concur with the documentation and the plan.    Scottie Castro MD.

## 2024-10-30 ENCOUNTER — HOSPITAL ENCOUNTER (OUTPATIENT)
Dept: RADIOLOGY | Facility: CLINIC | Age: 74
Discharge: HOME | End: 2024-10-30
Payer: MEDICARE

## 2024-10-30 DIAGNOSIS — C22.9 ADENOCARCINOMA DETERMINED BY BIOPSY OF LIVER (MULTI): ICD-10-CM

## 2024-10-30 PROCEDURE — 71260 CT THORAX DX C+: CPT | Performed by: RADIOLOGY

## 2024-10-30 PROCEDURE — 2550000001 HC RX 255 CONTRASTS: Performed by: INTERNAL MEDICINE

## 2024-10-30 PROCEDURE — 74177 CT ABD & PELVIS W/CONTRAST: CPT | Performed by: RADIOLOGY

## 2024-10-30 PROCEDURE — 74177 CT ABD & PELVIS W/CONTRAST: CPT

## 2024-11-04 ENCOUNTER — TELEPHONE (OUTPATIENT)
Dept: HEMATOLOGY/ONCOLOGY | Facility: CLINIC | Age: 74
End: 2024-11-04

## 2024-11-04 ENCOUNTER — OFFICE VISIT (OUTPATIENT)
Dept: HEMATOLOGY/ONCOLOGY | Facility: CLINIC | Age: 74
End: 2024-11-04
Payer: MEDICARE

## 2024-11-04 VITALS
OXYGEN SATURATION: 97 % | HEART RATE: 71 BPM | SYSTOLIC BLOOD PRESSURE: 105 MMHG | RESPIRATION RATE: 16 BRPM | DIASTOLIC BLOOD PRESSURE: 68 MMHG | BODY MASS INDEX: 31.33 KG/M2 | WEIGHT: 198.41 LBS | TEMPERATURE: 97 F

## 2024-11-04 DIAGNOSIS — C24.0: Primary | ICD-10-CM

## 2024-11-04 PROCEDURE — 1125F AMNT PAIN NOTED PAIN PRSNT: CPT | Performed by: INTERNAL MEDICINE

## 2024-11-04 PROCEDURE — 99214 OFFICE O/P EST MOD 30 MIN: CPT | Performed by: INTERNAL MEDICINE

## 2024-11-04 PROCEDURE — 1159F MED LIST DOCD IN RCRD: CPT | Performed by: INTERNAL MEDICINE

## 2024-11-04 ASSESSMENT — PAIN SCALES - GENERAL: PAINLEVEL_OUTOF10: 2

## 2024-11-04 ASSESSMENT — PATIENT HEALTH QUESTIONNAIRE - PHQ9
1. LITTLE INTEREST OR PLEASURE IN DOING THINGS: NOT AT ALL
SUM OF ALL RESPONSES TO PHQ9 QUESTIONS 1 AND 2: 0
2. FEELING DOWN, DEPRESSED OR HOPELESS: NOT AT ALL

## 2024-11-04 NOTE — PROGRESS NOTES
"Patient ambulated into clinic for follow up with Dr. Castro accompanied by her . Medications and allergies reviewed.     Stated appetite was okay.   Reported left lateral side pain almost where biopsy was done. \"Like a ball of gas pain\".     Has an appointment with Dr. Burton on 11/13.     CT scan reviewed.     EGD to be done tomorrow.       "

## 2024-11-04 NOTE — PROGRESS NOTES
"Patient ID: Elsa Castellano is a 73 y.o. female.    Diagnoses: Moderately differentiated adenocarcinoma of the liver    Genomic profile: pMMR, Tempus xT pending, and Guardant 360 NGS testing re-ordered on 11-4-2024 (initial specimen insufficient)     Assessment and Plan: 73 year old female with a history of pre-DM, HLD, HTN, CAD s/p MI, COPD, congenital single kidney, who presents in follow up with adenocarcinoma of the liver.    Patient's imaging and history is most consistent with an intra-hepatic cholangiocarcinoma. Her CT CAP does not reveal any definite metastatic disease. She is undergoing an EGD tomorrow. Her most recent Cologuard was reportedly negative (she cannot undergo colonoscopy due to intestinal malrotation). She is scheduled to see Dr. Burton on 11/13 to discuss surgical resection. We will resend Guardant 360 NGS as the initial specimen was an insufficient quantity. She will return virtually in 3 weeks to review the above and finalize the treatment plan.     Plan: follow up Temnimous, resend Guardant 360 NGS. Patient to undergo EGD tomorrow (11/5/2024) and see Dr. Burton on 11/13/24.     Follow up plan- 3 weeks to review the above     I have placed all orders as outlined above. I advised the patient to schedule the tests and follow-up appointment as discussed by contacting the  on the way out or calling by phone.    Providers:  Surgeon:   Jitendra:  Kayc:    Chief complaint: Liver lesion    HPI: 73 year old female with a history of pre-DM, HLD, HTN, CAD s/p MI, COPD, congenital single kidney, who presented as a new patient with adenocarcinoma of the liver.    Patient notes that in 2022 she had a \"small heart attack\" and a CT done on 8/28/22 demonstrated a lateral segment 2 lesion measuring 2.6x2cm. A follow up MRI liver performed on 9/15/23 showed this lesion was 2.8cm and read as \"most compatible with adenoma\". A follow up MRI liver perfromed on 9/9/24 demonstrated this lesion had grown to " 4.x2x2.29cm. This was biopsied on 10/2/24 showing moderately differentiated adenocarcinoma involving the liver.      47. Staging CT CAP with 4.3x3x3.7cm hepatic segment 2/3 mass and a 2.6 right adnexal cyst.    Patient continues to feel well. She has occasional left sided discomfort that she attributes to her biopsy. She denies any fevers, chills, fatigue, lymphadenopathy, headache, chest pain, dyspnea, cough, n/v/c/d, dysuria, rash, changes in weight or appetite.     Past Medical History:   Past Medical History:  08/28/2022: Acute non-ST segment elevation myocardial infarction   (Multi)  12/26/2023: Anxiety  09/08/2023: Chest pain  09/08/2023: Chronic obstructive pulmonary disease (Multi)  09/08/2023: Elevated liver enzymes  08/14/2023: Hepatomegaly  12/30/2023: History of hypertension  09/08/2023: Hyperlipidemia  09/08/2023: Macrocytosis without anemia  09/08/2023: Palpitations  No date: Personal history of other diseases of the circulatory system      Comment:  History of hypertension  No date: Personal history of other diseases of the respiratory system      Comment:  History of chronic obstructive lung disease  01/27/2023: Prediabetes  12/30/2023: Rhinitis  09/08/2023: Shortness of breath   Surgical History:    Past Surgical History:   Procedure Laterality Date    CARDIAC CATHETERIZATION  2022    x2    CATARACT EXTRACTION Bilateral 2014    COLONOSCOPY  2008    COLONOSCOPY  2013    COLONOSCOPY  2018    DILATION AND CURETTAGE OF UTERUS  2012    VAGINA SURGERY  1974      Family History:    Family History   Problem Relation Name Age of Onset    Stroke Father      Heart attack Father      No Known Problems Brother      Breast cancer Paternal Grandmother      Breast cancer Father's Sister       Family Oncology History:    Cancer-related family history includes Breast cancer in her father's sister and paternal grandmother.  Social History:    Social History     Tobacco Use    Smoking status: Former     Types:  Cigarettes     Passive exposure: Past    Smokeless tobacco: Never   Vaping Use    Vaping status: Never Used   Substance Use Topics    Alcohol use: Yes     Alcohol/week: 10.0 standard drinks of alcohol     Types: 10 Standard drinks or equivalent per week    Drug use: Never        Allergies  Allergies   Allergen Reactions    Penicillins Unknown, Anaphylaxis and Swelling    Hydrochlorothiazide Dizziness        Medications  Current Outpatient Medications   Medication Instructions    albuterol (Proventil HFA) 90 mcg/actuation inhaler 2 puffs, Every 6 hours PRN    amLODIPine (NORVASC) 10 mg, oral, Daily    aspirin (Adult Low Dose Aspirin) 81 mg EC tablet 1 tablet, Daily    atorvastatin (LIPITOR) 40 mg, oral, Daily    metoprolol succinate XL (TOPROL-XL) 50 mg, oral, Daily    Mounjaro 5 mg, abdominal subcutaneous, Weekly    umeclidinium (Incruse Ellipta) 62.5 mcg/actuation inhalation Inhale.          Objective   VS: /68 (BP Location: Right arm, Patient Position: Sitting, BP Cuff Size: Adult long)   Pulse 71   Temp 36.1 °C (97 °F) (Temporal)   Resp 16   Wt 90 kg (198 lb 6.6 oz)   SpO2 97%   BMI 31.33 kg/m²   Weight:   Vitals:    11/04/24 0916   Weight: 90 kg (198 lb 6.6 oz)        PHYSICAL EXAMINATION  ECOG performance status- 0  VS:  /68 (BP Location: Right arm, Patient Position: Sitting, BP Cuff Size: Adult long)   Pulse 71   Temp 36.1 °C (97 °F) (Temporal)   Resp 16   Wt 90 kg (198 lb 6.6 oz)   SpO2 97%   BMI 31.33 kg/m²   Weight:   Vitals:    11/04/24 0916   Weight: 90 kg (198 lb 6.6 oz)       BSA: 2.06 meters squared   Pain Scale: 0    Constitutional: Awake/alert/oriented x3, cooperative and answers questions appropriately.     Eyes: No pallor, conjunctival injection, clear sclera.    Mouth: No ulceration. No thrush.     Head/Neck: Neck supple, no apparent injury, thyroid without mass or tenderness, No JVD, trachea midline, no bruits.     Respiratory/Thorax: Normal breath sounds with bilaterally  symmetrical chest expansion. No dullness.      Cardiovascular: No audible murmurs, normal heart sounds. No pericardial rub.     Gastrointestinal: Nondistended, soft, non-tender, no rebound tenderness or guarding, no masses palpable, no organomegaly, +BS, no bruits. No ascites.     Musculoskeletal: No joint swelling, redness.      Extremities: normal extremities, no cyanosis edema, contusions or wounds, no clubbing.     Lymphatic: No significant lymphadenopathy.     Skin: Warm and dry, no lesions, no rashes.     Labs  10/21/24:  47, CEA 1.7, AFP 5    Image  10/30/24 CT CAP   IMPRESSION:  Liver lesion of unknown primary:  1. When compared to the 2023 MRI and CT there has been slight interval increase in size of the left hepatic lobe lesion consistent with patient's biopsy-proven adenocarcinoma. No significant change when compared with the recent MRI dated 09/09/2024. No definite sites of metastatic disease elsewhere.  2. Diffuse hepatic steatosis, correlate with liver function tests.  3. Cystic lesion of the right adnexa measuring 2.6 cm, further evaluation with a pelvic ultrasound is recommended.  4. Additional stable chronic and incidental findings described above.     9/9/24 MRI Liver  IMPRESSION:  1. Diffuse hepatic steatosis with lateral segment 2 hepatic  progressively enhancing solid lesion measuring currently 4.2 x 2.9 cm. This lesion has significantly increased in size since CT scan dated 08/28/2022 as it was measuring 2.6 x 2.0 cm. The growth, patient's age/demographic coupled with the absence of classic signal intensity of benign hepatic lesions including hemangioma would raise  the concern for malignant process including mass forming  cholangiocarcinoma or alternatively metastatic lesions.  2. 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 No duct dilatation. Please see below guidelines for suggested follow-up  3. Known intestinal malrotation without  obstruction    This note was created using a voice recognition system ( the Dragon dictation system). Inaccuracies and misspellings are unintentional.     Patient seen with Dr. Alex Spears.  I concur with the documentation and the plan.    Scottie Castro MD.    Addendum on 11/4/2024:  She has a cystic lesion in the right adnexa.  This lesion is present since at least 2022.  I talked to her over the phone today about it.  She has not seen a gynecology physician for many years.  I suggested a gynecology referral to address this issue which she is not willing to pursue at this time.  Instead, she wanted to talk to her family physician about it before making a plan and getting a gynecologic referral.    Scottie Castro MD.

## 2024-11-05 LAB
DNA RANGE(S) EXAMINED NAR: NORMAL
GENE DIS ANL INTERP-IMP: POSITIVE
GENE DIS ASSESSED: NORMAL
GENE MUT TESTED BLD/T: 3.2 M/MB
MSI CA SPEC-IMP: NORMAL
REASON FOR STUDY: NORMAL
TEMPUS GERMLINE NOTE: NORMAL
TEMPUS LCA: NORMAL
TEMPUS PORTAL: NORMAL
TEMPUS THERAPY1: NORMAL
TEMPUS THERAPYCOUNT: 1
TEMPUS TRIALCOUNT: 3
TEMPUS TRIALMATCHES1: NORMAL
TEMPUS TRIALMATCHES2: NORMAL
TEMPUS TRIALMATCHES3: NORMAL

## 2024-11-13 ENCOUNTER — OFFICE VISIT (OUTPATIENT)
Dept: SURGICAL ONCOLOGY | Facility: HOSPITAL | Age: 74
End: 2024-11-13
Payer: MEDICARE

## 2024-11-13 VITALS
DIASTOLIC BLOOD PRESSURE: 61 MMHG | RESPIRATION RATE: 16 BRPM | HEART RATE: 74 BPM | WEIGHT: 198.2 LBS | SYSTOLIC BLOOD PRESSURE: 140 MMHG | TEMPERATURE: 95.9 F | BODY MASS INDEX: 31.29 KG/M2 | OXYGEN SATURATION: 99 %

## 2024-11-13 DIAGNOSIS — C78.7: ICD-10-CM

## 2024-11-13 DIAGNOSIS — C18.9: ICD-10-CM

## 2024-11-13 DIAGNOSIS — C24.0: Primary | ICD-10-CM

## 2024-11-13 LAB — SCAN RESULT: NORMAL

## 2024-11-13 PROCEDURE — 99205 OFFICE O/P NEW HI 60 MIN: CPT | Performed by: SURGERY

## 2024-11-13 PROCEDURE — 99215 OFFICE O/P EST HI 40 MIN: CPT | Performed by: SURGERY

## 2024-11-13 PROCEDURE — 1126F AMNT PAIN NOTED NONE PRSNT: CPT | Performed by: SURGERY

## 2024-11-13 RX ORDER — HEPARIN SODIUM 5000 [USP'U]/ML
5000 INJECTION, SOLUTION INTRAVENOUS; SUBCUTANEOUS ONCE
OUTPATIENT
Start: 2024-11-13 | End: 2024-11-13

## 2024-11-13 RX ORDER — INDOCYANINE GREEN AND WATER 25 MG
2.5 KIT INJECTION ONCE
OUTPATIENT
Start: 2024-11-13 | End: 2024-11-13

## 2024-11-13 ASSESSMENT — ENCOUNTER SYMPTOMS
ADENOPATHY: 0
WEAKNESS: 0
SHORTNESS OF BREATH: 0
NAUSEA: 0
HALLUCINATIONS: 0
ENDOCRINE NEGATIVE: 1
CONSTITUTIONAL NEGATIVE: 1
EYE DISCHARGE: 0
DYSURIA: 0
CONFUSION: 0
SORE THROAT: 0
ABDOMINAL PAIN: 0
SPEECH DIFFICULTY: 0
FLANK PAIN: 0
HEADACHES: 0
VOMITING: 0

## 2024-11-13 ASSESSMENT — PAIN SCALES - GENERAL: PAINLEVEL_OUTOF10: 0-NO PAIN

## 2024-11-13 NOTE — PROGRESS NOTES
"Subjective     HPI  Elsa Castellano is a 73 y.o. female who is referred by Dr Castro for intrahepatic cholangiocarcinoma, biopsy-proven moderately differentiated adenocarcinoma. EGD recently without cancer, Cologpatrick was reportedly negative (she cannot undergo colonoscopy due to intestinal malrotation). From Dr Castro's note - Patient notes that in 2022 she had a \"small heart attack\" and a CT done on 8/28/22 demonstrated a lateral segment 2 lesion measuring 2.6x2cm. A follow up MRI liver performed on 9/15/23 showed this lesion was 2.8cm and read as \"most compatible with adenoma\". A follow up MRI liver perfromed on 9/9/24 demonstrated this lesion had grown to 4.x2x2.29cm. This was biopsied on 10/2/24 showing moderately differentiated adenocarcinoma involving the liver.       47. Staging CT CAP with 4.3x3x3.7cm hepatic segment 2/3 mass and a 2.6 right adnexal cyst.     Patient continues to feel well. She has occasional left sided discomfort that she attributes to her biopsy. She denies any fevers, chills, fatigue, lymphadenopathy, headache, chest pain, dyspnea, cough, n/v/c/d, dysuria, rash, changes in weight or appetite.      PMH - pre-DM, HLD, HTN, CAD s/p MI, COPD, congenital single kidney     Past abdominal surgical history is a laparotomy in her 20s which demonstrated  malrotation with appendectomy at that time    Review of Systems   Constitutional: Negative.    HENT:  Negative for ear discharge, nosebleeds and sore throat.    Eyes:  Negative for discharge.   Respiratory:  Negative for shortness of breath.    Cardiovascular:  Negative for chest pain.   Gastrointestinal:  Negative for abdominal pain, nausea and vomiting.   Endocrine: Negative.    Genitourinary:  Negative for dysuria and flank pain.   Musculoskeletal:  Negative for gait problem.   Skin:  Negative for rash.   Neurological:  Negative for speech difficulty, weakness and headaches.   Hematological:  Negative for adenopathy. "   Psychiatric/Behavioral:  Negative for behavioral problems, confusion and hallucinations.         Past Medical History:   Diagnosis Date    Acute non-ST segment elevation myocardial infarction (Multi) 08/28/2022    Anxiety 12/26/2023    Chest pain 09/08/2023    Chronic obstructive pulmonary disease (Multi) 09/08/2023    Elevated liver enzymes 09/08/2023    Hepatomegaly 08/14/2023    History of hypertension 12/30/2023    Hyperlipidemia 09/08/2023    Macrocytosis without anemia 09/08/2023    Palpitations 09/08/2023    Personal history of other diseases of the circulatory system     History of hypertension    Personal history of other diseases of the respiratory system     History of chronic obstructive lung disease    Prediabetes 01/27/2023    Rhinitis 12/30/2023    Shortness of breath 09/08/2023      Past Surgical History:   Procedure Laterality Date    CARDIAC CATHETERIZATION  2022    x2    CATARACT EXTRACTION Bilateral 2014    COLONOSCOPY  2008    COLONOSCOPY  2013    COLONOSCOPY  2018    DILATION AND CURETTAGE OF UTERUS  2012    VAGINA SURGERY  1974      Current Outpatient Medications on File Prior to Visit   Medication Sig Dispense Refill    albuterol (Proventil HFA) 90 mcg/actuation inhaler Inhale 2 puffs every 6 hours if needed for wheezing.      amLODIPine (Norvasc) 10 mg tablet Take 1 tablet (10 mg) by mouth once daily. 90 tablet 3    aspirin (Adult Low Dose Aspirin) 81 mg EC tablet Take 1 tablet (81 mg) by mouth once daily.      atorvastatin (Lipitor) 40 mg tablet Take 1 tablet (40 mg) by mouth once daily. 90 tablet 3    metoprolol succinate XL (Toprol-XL) 50 mg 24 hr tablet Take 1 tablet (50 mg) by mouth once daily. 90 tablet 3    tirzepatide (Mounjaro) 5 mg/0.5 mL pen injector 5 mg by abdominal subcutaneous route 1 (one) time per week. 0.5 mL 11    umeclidinium (Incruse Ellipta) 62.5 mcg/actuation inhalation Inhale.       No current facility-administered medications on file prior to visit.      Allergies    Allergen Reactions    Penicillins Unknown, Anaphylaxis and Swelling    Hydrochlorothiazide Dizziness      Social History     Socioeconomic History    Marital status:      Spouse name: Not on file    Number of children: Not on file    Years of education: Not on file    Highest education level: Not on file   Occupational History    Not on file   Tobacco Use    Smoking status: Former     Types: Cigarettes     Passive exposure: Past    Smokeless tobacco: Never   Vaping Use    Vaping status: Never Used   Substance and Sexual Activity    Alcohol use: Yes     Alcohol/week: 10.0 standard drinks of alcohol     Types: 10 Standard drinks or equivalent per week    Drug use: Never    Sexual activity: Defer   Other Topics Concern    Not on file   Social History Narrative    Not on file     Social Drivers of Health     Financial Resource Strain: Low Risk  (10/17/2024)    Overall Financial Resource Strain (CARDIA)     Difficulty of Paying Living Expenses: Not hard at all   Food Insecurity: No Food Insecurity (10/17/2024)    Hunger Vital Sign     Worried About Running Out of Food in the Last Year: Never true     Ran Out of Food in the Last Year: Never true   Transportation Needs: No Transportation Needs (10/17/2024)    PRAPARE - Transportation     Lack of Transportation (Medical): No     Lack of Transportation (Non-Medical): No   Physical Activity: Inactive (10/17/2024)    Exercise Vital Sign     Days of Exercise per Week: 0 days     Minutes of Exercise per Session: 0 min   Stress: No Stress Concern Present (10/17/2024)    [employer] - Occupational Stress Questionnaire     Feeling of Stress : Not at all   Social Connections: Moderately Integrated (10/17/2024)    Social Connection and Isolation Panel [NHANES]     Frequency of Communication with Friends and Family: Three times a week     Frequency of Social Gatherings with Friends and Family: More than three times a week     Attends Taoist  Services: More than 4 times per year     Active Member of Clubs or Organizations: No     Attends Club or Organization Meetings: Never     Marital Status:    Intimate Partner Violence: Not At Risk (10/17/2024)    Humiliation, Afraid, Rape, and Kick questionnaire     Fear of Current or Ex-Partner: No     Emotionally Abused: No     Physically Abused: No     Sexually Abused: No   Housing Stability: Unknown (10/17/2024)    Housing Stability Vital Sign     Unable to Pay for Housing in the Last Year: No     Number of Times Moved in the Last Year: Not on file     Homeless in the Last Year: No      Family History   Problem Relation Name Age of Onset    Stroke Father      Heart attack Father      No Known Problems Brother      Breast cancer Paternal Grandmother      Breast cancer Father's Sister            Objective     Vitals:    11/13/24 1113   BP: 140/61   Pulse: 74   Resp: 16   Temp: 35.5 °C (95.9 °F)   SpO2: 99%          Physical Exam  Constitutional:       Appearance: Normal appearance.   HENT:      Head: Atraumatic.      Nose: Nose normal.   Eyes:      Extraocular Movements: Extraocular movements intact.      Conjunctiva/sclera: Conjunctivae normal.   Cardiovascular:      Rate and Rhythm: Normal rate.   Pulmonary:      Effort: Pulmonary effort is normal.   Abdominal:      Palpations: Abdomen is soft.      Tenderness: There is no abdominal tenderness.      Comments: Lower midline scar to level of umbilicus   Musculoskeletal:         General: Normal range of motion.   Skin:     Findings: No rash.   Neurological:      General: No focal deficit present.      Mental Status: She is alert.   Psychiatric:         Behavior: Behavior normal.          Lab Results   Component Value Date    WBC 7.9 10/21/2024    HGB 14.4 10/21/2024    HCT 43.3 10/21/2024     10/21/2024     Lab Results   Component Value Date     10/21/2024    K 4.0 10/21/2024     10/21/2024    CO2 21 10/21/2024    BUN 17 10/21/2024     CREATININE 0.83 10/21/2024    EGFR 74 10/21/2024    CALCIUM 9.4 10/21/2024    ALBUMIN 4.4 10/21/2024    PROT 7.1 10/21/2024    AST 29 10/21/2024    ALT 34 10/21/2024    BILITOT 0.6 10/21/2024     Lab Results   Component Value Date    CEA 2.7 10/21/2024     47.24 (H) 10/21/2024    AFP 5 10/21/2024        === 09/09/24 ===    MR LIVER WO AND W CONTRAST    - Impression -  1. Diffuse hepatic steatosis with lateral segment 2 hepatic  progressively enhancing solid lesion measuring currently 4.2 x 2.9  cm. This lesion has significantly increased in size since CT scan  dated 08/28/2022 as it was measuring 2.6 x 2.0 cm. The growth,  patient's age/demographic coupled with the absence of classic signal  intensity of benign hepatic lesions including hemangioma would raise  the concern for malignant process including mass forming  cholangiocarcinoma or alternatively metastatic lesions.  2. 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 No duct dilatation. Please see below  guidelines for suggested follow-up  3. Known intestinal malrotation without obstruction    Recommendation:  Hepatobiliary surgery/service referral.  Consider tissue sampling.    MACRO:  Incidental Finding:  There is a small cystic lesion noted within the  pancreas measuring less than 15 mm.  (**-YCF-**)    Instructions:  Follow-up contrast enhanced MRI or pancreas protocol  CT every two years up to 10 years or more frequent imaging versus  EUS/FNA in case of interval growth. (Debora AJ, Karel ME, Jericho DE,  et al. Management of Incidental Pancreatic Cysts: A White Paper of  the ACR Incidental Findings Committee. J Am Jeremy Radiol.  2017;14(7):911-923.) [email or web address]    Signed by: Shad Sanchez 9/9/2024 3:11 PM  Dictation workstation:   [id number]      === 09/15/23 ===    MR LIVER WO AND W CONTRAST    - Impression -  2.8 lesion in the lateral segment of left lobe of liver, not compatible with  hemangioma.  Timing of arterial phase imaging was inadequate. Lesional  hyperenhancement observed on CT 08/28/2022 with no significant change in  volume of the observation. The constellation of findings are most compatible  with adenoma.      Dictation workstation:   [id number]    Original Interpreting Physician:   NEFTALI HERNANDEZ MD  Original Transcribed by/Date: MMODAL Sep 15 2023 11:55A  Original Electronically Signed by/Date: NEFTALI HERNANDEZ MD Sep 15 2023  4:28P    Addendum Interpreting Physician:  Addendum Transcribed by/Date: NO ADDENDUM  Addendum Electronically Signed by/Date:      === 11/18/13 ===    MRI SHOULDER LEFT WO CONTRAST    - Impression -  1. Partial bursal surface tearing of the anterior fibers  of the supraspinatus and partial articular surface tearing of the anterior  fibers of the infraspinatus. No full-thickness rotator cuff tear.  2. MR findings suggestive of adhesive capsulitis.  Clinical correlation is  suggested.  3. Biceps tenosynovitis.  4. Extensive tearing of the superior, posterior/superior, and  anterior/inferior labrum.  5. Moderately severe AC joint osteoarthrosis with mass effect upon the  myotendinous junction of the supraspinatus.          Interpreting Physician:   MADI BIRCH MD  Transcribed by/Date: PSCB   Nov 18 2013  5:24P  Electronically Signed by/Date: MADI BIRCH MD [id number]  Addendum Dictated by/Date: NO ADDENDUM  The physician noted as 'Electronically Signed by' may not be the physician  who interpreted the images. This physician may only be releasing the report  for printing according to hospital protocol.  end  CT chest abdomen pelvis w IV contrast    Result Date: 10/31/2024  Interpreted By:  Jose De Anda and Jiang Sirui STUDY: CT CHEST ABDOMEN PELVIS W IV CONTRAST;  10/30/2024 9:13 am   INDICATION: Signs/Symptoms:Liver lesion with unknown primary.   Per clinical notes: 73-year-old female with history of recently diagnosed adenocarcinoma of the liver.   ,C22.9  Malignant neoplasm of liver, not specified as primary or secondary (Multi)   COMPARISON: MRI 09/09/2024, 09/15/2023 CT AP 08/14/2023   ACCESSION NUMBER(S): [id number]   ORDERING CLINICIAN: MAIKEL SZYMANSKI   TECHNIQUE: CT of the chest, abdomen, and pelvis was performed.  Contiguous axial images were obtained at 3 mm slice thickness through the chest, abdomen and pelvis. Coronal and sagittal reconstructions at 3 mm slice thickness were performed. 75 ML of Omnipaque 350 was administered intravenously without immediate complication.   FINDINGS: CHEST:   LUNG/PLEURA/LARGE AIRWAYS: The trachea and central airways are patent. No endobronchial lesion. No consolidation, pleural effusion, or pneumothorax. No suspicious pulmonary nodules identified.   VESSELS: Aorta and pulmonary arteries are normal caliber.  Mild atherosclerotic changes are noted of the aorta and branching vessels. Moderate coronary artery calcifications are present.   HEART: The heart is normal in size.  No pericardial effusion   MEDIASTINUM AND REGINALDO: No mediastinal, hilar or axillary lymphadenopathy is present.  A small sliding hiatal hernia is noted.   CHEST WALL AND LOWER NECK: The soft tissues of the chest wall demonstrate no gross abnormality. The visualized thyroid gland appears within normal limits.   ABDOMEN:   LIVER: The liver is normal in size. There is diffuse hypoattenuation compatible with steatosis. A 4.3 x 3.0 x 3.7 cm heterogeneously enhancing mass is seen of hepatic segment 2/3 (series 3, image 87 and series 7, image 44) which is not significantly changed compared to the prior MRI dated 09/09/2024 and minimally increased in size when compared to the MRI dated 09/15/2023 where it measured 4.1 x 2.6 cm. No other worrisome hepatic lesions identified.   BILE DUCTS: The intrahepatic and extrahepatic ducts are not dilated.   GALLBLADDER: No calcified stones. No wall thickening.   PANCREAS: The pancreas appears unremarkable without evidence of  ductal dilatation or masses.   SPLEEN: The spleen is normal in size without focal lesions.   ADRENAL GLANDS: Bilateral adrenal glands appear normal.   KIDNEYS AND URETERS: The left kidney is absent, consistent with patient's given history of congenital absence. The right kidney is normal in size and enhancement. No hydroureteronephrosis or nephroureterolithiasis.   PELVIS:   BLADDER: The urinary bladder is decompressed, limited for evaluation.   REPRODUCTIVE ORGANS: The uterus is present. There is a 2.6 cm cystic lesion of the right adnexa (series 3, image 218).   BOWEL: A small sliding hiatal hernia is noted, otherwise the stomach is decompressed, limited for evaluation. The small and large bowel demonstrate no abnormal bowel thickening or dilatation. The appendix is not definitively visualized. Scattered colonic diverticulosis without evidence of acute diverticulitis.     VESSELS: Mild atherosclerotic calcification of the abdominal aorta without AAA. The IVC is unremarkable. The portal venous vasculature is patent.   PERITONEUM/RETROPERITONEUM/LYMPH NODES: No ascites or free air, no fluid collection.  There are prominent periportal lymph nodes measuring up to 1.0 cm (series 3, image 99) which are not significantly changed compared to the prior examination.   BONE AND SOFT TISSUE: No suspicious osseous lesions are identified. Degenerative discogenic disease is noted in the lower thoracic and lumbar spine.  The abdominal wall soft tissues appear normal.       Liver lesion of unknown primary: 1. When compared to the 2023 MRI and CT there has been slight interval increase in size of the left hepatic lobe lesion consistent with patient's biopsy-proven adenocarcinoma. No significant change when compared with the recent MRI dated 09/09/2024. No definite sites of metastatic disease elsewhere. 2. Diffuse hepatic steatosis, correlate with liver function tests. 3. Cystic lesion of the right adnexa measuring 2.6 cm, further  evaluation with a pelvic ultrasound is recommended. 4. Additional stable chronic and incidental findings described above.   I personally reviewed the image(s) / study and I agree with the findings as stated by Lavon Puente MD. This study was interpreted at [facility], [place], [place].   MACRO: None   Signed by: Jose De Anda 10/31/2024 7:22 PM Dictation workstation:   [id number]      Assessment/Plan     73 year old with intrahepatic cholangiocarcinoma in left lateral section. Recommend robotic-assisted left lateral sectionectomy with regional lymphadenectomy. Plan for 12/3/2024. Informed consent obtained. PAT samantha Burton MD

## 2024-11-18 ENCOUNTER — APPOINTMENT (OUTPATIENT)
Dept: CARE COORDINATION | Facility: CLINIC | Age: 74
End: 2024-11-18
Payer: COMMERCIAL

## 2024-11-18 LAB
DNA RANGE(S) EXAMINED NAR: NORMAL
GENE DIS ANL INTERP-IMP: POSITIVE
GENE DIS ASSESSED: NORMAL
GENE MUT TESTED BLD/T: 3.2 M/MB
MSI CA SPEC-IMP: NORMAL
REASON FOR STUDY: NORMAL
TEMPUS GERMLINE NOTE: NORMAL
TEMPUS LCA: NORMAL
TEMPUS PORTAL: NORMAL
TEMPUS THERAPY1: NORMAL
TEMPUS THERAPYCOUNT: 1
TEMPUS TRIALCOUNT: 3
TEMPUS TRIALMATCHES1: NORMAL
TEMPUS TRIALMATCHES2: NORMAL
TEMPUS TRIALMATCHES3: NORMAL
TEMPUS XR RESULT 1: NORMAL

## 2024-11-18 NOTE — PROGRESS NOTES
"Nutrition Follow-up   Dietitian 8 wks/2 mo follow-up. Pt is being seen at [facility] for  wt management, improving overall health    Labs  Lab Results   Component Value Date    HGBA1C 5.5 06/18/2024    HGBA1C 5.9 (H) 02/09/2024    HGBA1C 5.6 01/27/2023     10/21/2024    K 4.0 10/21/2024     10/21/2024    CO2 21 10/21/2024    BUN 17 10/21/2024    CREATININE 0.83 10/21/2024    CALCIUM 9.4 10/21/2024    ALBUMIN 4.4 10/21/2024    PROT 7.1 10/21/2024    BILITOT 0.6 10/21/2024    ALKPHOS 49 10/21/2024    ALT 34 10/21/2024    AST 29 10/21/2024    GLUCOSE 116 (H) 10/21/2024       Lab Results   Component Value Date    CHOL 162 06/18/2024    LDLCALC 80 06/18/2024    TRIG 99 06/18/2024    HDL 62.0 06/18/2024       Anthropometrics  Ht Readings from Last 1 Encounters:   10/21/24 (S) 1.695 m (5' 6.73\")       BMI Readings from Last 1 Encounters:   11/13/24 31.29 kg/m²       Wt Readings from Last 10 Encounters:   11/13/24 89.9 kg (198 lb 3.2 oz)   11/04/24 90 kg (198 lb 6.6 oz)   10/21/24 89.3 kg (196 lb 12.2 oz)   09/09/24 88.9 kg (196 lb)   06/26/24 91.2 kg (201 lb)   03/28/24 91.6 kg (202 lb)   03/22/24 94.8 kg (209 lb)   02/09/24 95.7 kg (211 lb)   01/23/24 92.1 kg (203 lb)   12/26/23 93.4 kg (206 lb)       Weight change:     Down ~2 lbs, 193 lbs currently    Visit Summary  Adwoa explains her recent liver cancer diagnosis, she is staying positive and looking forward to the holidays. Unsure if they will keep her on Mounjaro, will find out more prior to surgery (12/3). Her eating habits and water intake have not changed, she has maintained healthy habits. She canceled her membership at the fitness [employer] for now. We discussed some tips handling poor appetite post surgery if that happens. All questions answered.       Nutrition Goals    Continue current eating habits, will reassess situation in January.      Follow up Plan  This RD is scheduled to call patient in January, we will discuss a follow up at that time  "

## 2024-11-21 ENCOUNTER — PRE-ADMISSION TESTING (OUTPATIENT)
Dept: PREADMISSION TESTING | Facility: HOSPITAL | Age: 74
End: 2024-11-21
Payer: MEDICARE

## 2024-11-21 VITALS
OXYGEN SATURATION: 98 % | HEIGHT: 67 IN | SYSTOLIC BLOOD PRESSURE: 127 MMHG | WEIGHT: 197.9 LBS | TEMPERATURE: 96.7 F | BODY MASS INDEX: 31.06 KG/M2 | DIASTOLIC BLOOD PRESSURE: 85 MMHG | HEART RATE: 77 BPM

## 2024-11-21 DIAGNOSIS — C24.0: ICD-10-CM

## 2024-11-21 LAB
ABO GROUP (TYPE) IN BLOOD: NORMAL
ANTIBODY SCREEN: NORMAL
RH FACTOR (ANTIGEN D): NORMAL

## 2024-11-21 PROCEDURE — 86850 RBC ANTIBODY SCREEN: CPT

## 2024-11-21 PROCEDURE — 36415 COLL VENOUS BLD VENIPUNCTURE: CPT

## 2024-11-21 PROCEDURE — 99205 OFFICE O/P NEW HI 60 MIN: CPT | Performed by: NURSE PRACTITIONER

## 2024-11-21 RX ORDER — OMEPRAZOLE 20 MG/1
20 CAPSULE, DELAYED RELEASE ORAL DAILY
COMMUNITY

## 2024-11-21 ASSESSMENT — ENCOUNTER SYMPTOMS
NECK NEGATIVE: 1
ENDOCRINE NEGATIVE: 1
NEUROLOGICAL NEGATIVE: 1
RESPIRATORY NEGATIVE: 1
CONSTITUTIONAL NEGATIVE: 1
GASTROINTESTINAL NEGATIVE: 1
EYES NEGATIVE: 1
MUSCULOSKELETAL NEGATIVE: 1

## 2024-11-21 ASSESSMENT — DUKE ACTIVITY SCORE INDEX (DASI)
DASI METS SCORE: 6.3
CAN YOU RUN A SHORT DISTANCE: NO
CAN YOU WALK A BLOCK OR TWO ON LEVEL GROUND: YES
CAN YOU HAVE SEXUAL RELATIONS: YES
CAN YOU DO HEAVY WORK AROUND THE HOUSE LIKE SCRUBBING FLOORS OR LIFTING AND MOVING HEAVY FURNITURE: NO
CAN YOU DO LIGHT WORK AROUND THE HOUSE LIKE DUSTING OR WASHING DISHES: YES
CAN YOU TAKE CARE OF YOURSELF (EAT, DRESS, BATHE, OR USE TOILET): YES
TOTAL_SCORE: 28.7
CAN YOU PARTICIPATE IN STRENOUS SPORTS LIKE SWIMMING, SINGLES TENNIS, FOOTBALL, BASKETBALL, OR SKIING: NO
CAN YOU CLIMB A FLIGHT OF STAIRS OR WALK UP A HILL: YES
CAN YOU PARTICIPATE IN MODERATE RECREATIONAL ACTIVITIES LIKE GOLF, BOWLING, DANCING, DOUBLES TENNIS OR THROWING A BASEBALL OR FOOTBALL: NO
CAN YOU DO YARD WORK LIKE RAKING LEAVES, WEEDING OR PUSHING A MOWER: YES
CAN YOU DO MODERATE WORK AROUND THE HOUSE LIKE VACUUMING, SWEEPING FLOORS OR CARRYING GROCERIES: YES
CAN YOU WALK INDOORS, SUCH AS AROUND YOUR HOUSE: YES

## 2024-11-21 NOTE — PREPROCEDURE INSTRUCTIONS
Medication List            Accurate as of November 21, 2024  8:02 AM. Always use your most recent med list.                Adult Low Dose Aspirin 81 mg EC tablet  Generic drug: aspirin  Medication Adjustments for Surgery: Take on the morning of surgery     amLODIPine 10 mg tablet  Commonly known as: Norvasc  Take 1 tablet (10 mg) by mouth once daily.  Medication Adjustments for Surgery: Take on the morning of surgery     atorvastatin 40 mg tablet  Commonly known as: Lipitor  Take 1 tablet (40 mg) by mouth once daily.  Medication Adjustments for Surgery: Take on the morning of surgery     Incruse Ellipta 62.5 mcg/actuation inhalation  Generic drug: umeclidinium  Medication Adjustments for Surgery: Take on the morning of surgery     metoprolol succinate XL 50 mg 24 hr tablet  Commonly known as: Toprol-XL  Take 1 tablet (50 mg) by mouth once daily.  Medication Adjustments for Surgery: Take on the morning of surgery     Mounjaro 5 mg/0.5 mL pen injector  Generic drug: tirzepatide  5 mg by abdominal subcutaneous route 1 (one) time per week.  Additional Medication Adjustments for Surgery: Take last dose 7 days before surgery     omeprazole 20 mg DR capsule  Commonly known as: PriLOSEC  Medication Adjustments for Surgery: Take on the morning of surgery     Proventil HFA 90 mcg/actuation inhaler  Generic drug: albuterol  Medication Adjustments for Surgery: Take on the morning of surgery                        **Concerning above medication instructions, if medication is normally taken at night, continue normal schedule.**  **DO NOT TAKE NIGHT PRIOR AND MORNING OF SURGERY**    CONTACT SURGEON'S OFFICE IF YOU DEVELOP:  * Fever = 100.4 F   * New respiratory symptoms (e.g. cough, shortness of breath, respiratory distress, sore throat)  * Recent loss of taste or smell  *Flu like symptoms such as headache, fatigue or gastrointestinal symptoms  * You develop any open sores, shingles, burning or painful urination   AND/OR:  * You  no longer wish to have the surgery.  * Any other personal circumstances change that may lead to the need to cancel or defer this surgery.  *You were admitted to any hospital within one week of your planned procedure.    SMOKING:  *Quitting smoking can make a huge difference to your health and recovery from surgery.    *If you need help with quitting, call 7-332-QUIT-NOW.    THE DAY OF SURGERY:  *Do not eat any food after midnight the night before surgery.   *You must drink 14 ounces of clear liquids (i.e. water, black coffee (no milk or cream), tea, apple juice or electrolyte drinks (gatorade)) 2 hours before your arrival time.  *You may chew gum until 2 hours before your surgery      ON THE MORNING OF SURGERY:  *Wear comfortable, loose fitting clothing.   *Do not use moisturizers, creams, lotions or perfume.  *All jewelry and valuables should be left at home.  *Prosthetic devices such as contact lenses, hearing aids, dentures, eyelash extensions, hairpins and body piercing must be removed before surgery.    BRING WITH YOU:  *Photo ID and insurance card  *Current list of medicines and allergies  *Pacemaker/Defibrillator/Heart stent cards  *CPAP machine and mask  *Slings/splints/crutches  *Copy of your complete Advanced Directive/DHPOA-if applicable  *Neurostimulator implant remote      AFTER OUTPATIENT SURGERY:  *A responsible adult MUST accompany you at the time of discharge and stay with you for 24 hours after your surgery.  *You may NOT drive yourself home after surgery.  *You may use a taxi or ride sharing service ([employer], [employer]) to return home ONLY if you are accompanied by a friend or family member.  *Instructions for resuming your medications will be provided by your surgeon.

## 2024-11-21 NOTE — H&P (VIEW-ONLY)
"CPM/PAT Evaluation       Name: Elsa Castellano (Elsa Castellano)  /Age: 1950/73 y.o.     In-Person           HPI        Date of Consult: 24    Referring Provider: Dr. Burton    Surgery, Date, and Length: Robot-Assisted Left Lateral Liver Resection Robot-Assisted Lymphadenectomy 12/3/24    Elsa Castellano is a 73 year-old female who presents to the [facility] [place] PAT for perioperative risk assessment prior to surgery.    Patient presents with a primary diagnosis of referred by Dr Castro for intrahepatic cholangiocarcinoma, biopsy-proven moderately differentiated adenocarcinoma. EGD recently without cancer, Cologuard was reportedly negative (she cannot undergo colonoscopy due to intestinal malrotation). From Dr Castro's note - Patient notes that in  she had a \"small heart attack\" and a CT done on 22 demonstrated a lateral segment 2 lesion measuring 2.6x2cm. A follow up MRI liver performed on 9/15/23 showed this lesion was 2.8cm and read as \"most compatible with adenoma\". A follow up MRI liver perfromed on 24 demonstrated this lesion had grown to 4.x2x2.29cm. This was biopsied on 10/2/24 showing moderately differentiated adenocarcinoma involving the liver.       47. Staging CT CAP with 4.3x3x3.7cm hepatic segment 2/3 mass and a 2.6 right adnexal cyst.     Patient continues to feel well. She has occasional left sided discomfort that she attributes to her biopsy.    This note was created in part upon personal review of patient's medical records.      Patient is scheduled to have Robot-Assisted Left Lateral Liver Resection Robot-Assisted Lymphadenectomy       Pt denies any past history of anesthetic complications such as PONV, awareness, prolonged sedation, dental damage, aspiration, cardiac arrest, difficult intubation, difficult I.V. access or unexpected hospital admissions.  NO malignant hyperthermia and or pseudocholinesterase deficiency.  No history of blood transfusions     STOP BANG " 2    The patient is not a Restorationist and will accept blood and blood products if medically indicated.   Type and screen sent.     Past Medical History:   Diagnosis Date    Acute non-ST segment elevation myocardial infarction (Multi) 08/28/2022    Anxiety 12/26/2023    Chest pain 09/08/2023    Chronic obstructive pulmonary disease (Multi) 09/08/2023    Elevated liver enzymes 09/08/2023    Hepatomegaly 08/14/2023    History of hypertension 12/30/2023    Hyperlipidemia 09/08/2023    Macrocytosis without anemia 09/08/2023    Palpitations 09/08/2023    Prediabetes 01/27/2023    Rhinitis 12/30/2023    Shortness of breath 09/08/2023       Past Surgical History:   Procedure Laterality Date    APPENDECTOMY      CARDIAC CATHETERIZATION  2022    x2    CATARACT EXTRACTION Bilateral 2014    COLONOSCOPY  2008    COLONOSCOPY  2013    COLONOSCOPY  2018    DILATION AND CURETTAGE OF UTERUS  2012    VAGINA SURGERY  1974       Social History     Tobacco Use    Smoking status: Former     Types: Cigarettes     Passive exposure: Past    Smokeless tobacco: Never   Vaping Use    Vaping status: Never Used   Substance Use Topics    Alcohol use: Yes     Alcohol/week: 10.0 standard drinks of alcohol     Types: 10 Standard drinks or equivalent per week    Drug use: Never        Family History   Problem Relation Name Age of Onset    No Known Problems Mother      Stroke Father      Heart attack Father      No Known Problems Brother      Breast cancer Paternal Grandmother      Breast cancer Father's Sister         Allergies   Allergen Reactions    Penicillins Unknown, Anaphylaxis and Swelling    Hydrochlorothiazide Dizziness            PAT ROS:   Constitutional:   neg    Neuro/Psych:   neg    Eyes:   neg    Ears:   neg    Nose:   neg    Mouth:   neg    Throat:   neg    Neck:   neg    Cardio:    FUNCTIONAL 4 METS. DENIES SOB WALKING FROM PAT TO PARKING LOT  Respiratory:   neg    Endocrine:   neg    GI:   neg    :   neg    Musculoskeletal:  "  neg    Hematologic:   neg    Skin:  neg        Physical Exam  Vitals reviewed. Physical exam within normal limits.          PAT AIRWAY:   Airway:     Mallampati::  II    Neck ROM::  Full   SEVERAL MISSING TEETH      Heart Rate:  [77]   Temp:  [35.9 °C (96.7 °F)]   BP: (127)/(85)   Height:  [170.2 cm (5' 7\")]   Weight:  [89.8 kg (197 lb 14.4 oz)]   SpO2:  [98 %]      Lab Results   Component Value Date    WBC 7.9 10/21/2024    HGB 14.4 10/21/2024    HCT 43.3 10/21/2024    MCV 93 10/21/2024     10/21/2024      Lab Results   Component Value Date    GLUCOSE 116 (H) 10/21/2024    CALCIUM 9.4 10/21/2024     10/21/2024    K 4.0 10/21/2024    CO2 21 10/21/2024     10/21/2024    BUN 17 10/21/2024    CREATININE 0.83 10/21/2024      Lab Results   Component Value Date    ALT 34 10/21/2024    AST 29 10/21/2024    ALKPHOS 49 10/21/2024    BILITOT 0.6 10/21/2024      Lab Results   Component Value Date    INR 1.0 10/02/2024    INR 1.0 09/22/2023    PROTIME 10.3 10/02/2024    PROTIME 10.3 09/22/2023        EKG from 1/2024:  SEE DOCUMENTS FOR EKG       CT Chest 2024  FINDINGS:  CHEST:      LUNG/PLEURA/LARGE AIRWAYS:  The trachea and central airways are patent. No endobronchial lesion.  No consolidation, pleural effusion, or pneumothorax. No suspicious  pulmonary nodules identified.      VESSELS:  Aorta and pulmonary arteries are normal caliber.  Mild  atherosclerotic changes are noted of the aorta and branching vessels.  Moderate coronary artery calcifications are present.      HEART:  The heart is normal in size.  No pericardial effusion      MEDIASTINUM AND REGINALDO:  No mediastinal, hilar or axillary lymphadenopathy is present.  A  small sliding hiatal hernia is noted.      CHEST WALL AND LOWER NECK:  The soft tissues of the chest wall demonstrate no gross abnormality.  The visualized thyroid gland appears within normal limits.      ABDOMEN:      LIVER:  The liver is normal in size. There is diffuse " hypoattenuation  compatible with steatosis. A 4.3 x 3.0 x 3.7 cm heterogeneously  enhancing mass is seen of hepatic segment 2/3 (series 3, image 87 and  series 7, image 44) which is not significantly changed compared to  the prior MRI dated 09/09/2024 and minimally increased in size when  compared to the MRI dated 09/15/2023 where it measured 4.1 x 2.6 cm.  No other worrisome hepatic lesions identified.      BILE DUCTS:  The intrahepatic and extrahepatic ducts are not dilated.      GALLBLADDER:  No calcified stones. No wall thickening.      PANCREAS:  The pancreas appears unremarkable without evidence of ductal  dilatation or masses.      SPLEEN:  The spleen is normal in size without focal lesions.      ADRENAL GLANDS:  Bilateral adrenal glands appear normal.      KIDNEYS AND URETERS:  The left kidney is absent, consistent with patient's given history of  congenital absence. The right kidney is normal in size and  enhancement. No hydroureteronephrosis or nephroureterolithiasis.      PELVIS:      BLADDER:  The urinary bladder is decompressed, limited for evaluation.      REPRODUCTIVE ORGANS:  The uterus is present. There is a 2.6 cm cystic lesion of the right  adnexa (series 3, image 218).      BOWEL:  A small sliding hiatal hernia is noted, otherwise the stomach is  decompressed, limited for evaluation. The small and large bowel  demonstrate no abnormal bowel thickening or dilatation. The appendix  is not definitively visualized. Scattered colonic diverticulosis  without evidence of acute diverticulitis.          VESSELS:  Mild atherosclerotic calcification of the abdominal aorta without  AAA. The IVC is unremarkable. The portal venous vasculature is patent.      PERITONEUM/RETROPERITONEUM/LYMPH NODES:  No ascites or free air, no fluid collection.  There are prominent  periportal lymph nodes measuring up to 1.0 cm (series 3, image 99)  which are not significantly changed compared to the prior examination.      BONE  AND SOFT TISSUE:  No suspicious osseous lesions are identified. Degenerative discogenic  disease is noted in the lower thoracic and lumbar spine.  The  abdominal wall soft tissues appear normal.      IMPRESSION:  Liver lesion of unknown primary:  1. When compared to the 2023 MRI and CT there has been slight  interval increase in size of the left hepatic lobe lesion consistent  with patient's biopsy-proven adenocarcinoma. No significant change  when compared with the recent MRI dated 09/09/2024. No definite sites  of metastatic disease elsewhere.  2. Diffuse hepatic steatosis, correlate with liver function tests.  3. Cystic lesion of the right adnexa measuring 2.6 cm, further  evaluation with a pelvic ultrasound is recommended.  4. Additional stable chronic and incidental findings described above.      Assessment and Plan:         Patient is a 73-year-old female scheduled for a with Dr. Burton on 12/3/24.  Patient has no active cardiac symptoms.   Patient denies any chest pain, tightness, heaviness, pressure, radiating pain, palpitations, irregular heartbeats, lightheadedness, cough, congestion, shortness of breath, ROSALES, PND, near syncope, weight loss or gain.     RCRI  2  ,10 % Risk of MACE    Cardiology:  -- History of STEMI: being monitored by Dr. Bedolla in cardiology. Follows up cardiology annually. Patient is asymptomatic. On low dose ASA. Instruct to continue perioperatively since benefits outweigh the risks    Pulmonology:  -- History of COPD. Instruct to take control inhalers on DOS    Hematology:  Patient instructed to ambulate as soon as possible postoperatively to decrease thromboembolic risk.   Initiate mechanical DVT prophylaxis as soon as possible and initiate chemical prophylaxis when deemed safe from a bleeding standpoint post surgery.       Manueli: 6    Renal:  -- Recommendations to avoid nephrotoxic drugs and carefully monitor fluid status to maintain euvolemia. Use dose adjusted medications as  needed for the underlying level of renal function.    MEDS: patient in monjuora. Instruct to hold it 7 days prior to surgery    Risk assessment complete.  Patient is scheduled for a low surgical risk procedure.        Preoperative medication instructions were provided and reviewed with the patient.  Any additional testing or evaluation was explained to the patient.  Nothing by mouth instructions were discussed and patient's questions were answered prior to conclusion to this encounter.  Patient verbalized understanding of preoperative instructions given in preadmission testing; discharge instructions available in EMR.    This note was dictated by a speech recognition.  Minor errors may have been detected in a speech recognition.

## 2024-11-21 NOTE — CPM/PAT H&P
"CPM/PAT Evaluation       Name: Elsa Castellano (Elsa Castellano)  /Age: 1950/73 y.o.     In-Person           HPI        Date of Consult: 24    Referring Provider: Dr. Burton    Surgery, Date, and Length: Robot-Assisted Left Lateral Liver Resection Robot-Assisted Lymphadenectomy 12/3/24    Elsa Castellano is a 73 year-old female who presents to the [facility] [place] [facility] for perioperative risk assessment prior to surgery.    Patient presents with a primary diagnosis of referred by Dr Castro for intrahepatic cholangiocarcinoma, biopsy-proven moderately differentiated adenocarcinoma. EGD recently without cancer, Cologuard was reportedly negative (she cannot undergo colonoscopy due to intestinal malrotation). From Dr Castro's note - Patient notes that in  she had a \"small heart attack\" and a CT done on 22 demonstrated a lateral segment 2 lesion measuring 2.6x2cm. A follow up MRI liver performed on 9/15/23 showed this lesion was 2.8cm and read as \"most compatible with adenoma\". A follow up MRI liver perfromed on 24 demonstrated this lesion had grown to 4.x2x2.29cm. This was biopsied on 10/2/24 showing moderately differentiated adenocarcinoma involving the liver.       47. Staging CT CAP with 4.3x3x3.7cm hepatic segment 2/3 mass and a 2.6 right adnexal cyst.     Patient continues to feel well. She has occasional left sided discomfort that she attributes to her biopsy.    This note was created in part upon personal review of patient's medical records.      Patient is scheduled to have Robot-Assisted Left Lateral Liver Resection Robot-Assisted Lymphadenectomy       Pt denies any past history of anesthetic complications such as PONV, awareness, prolonged sedation, dental damage, aspiration, cardiac arrest, difficult intubation, difficult I.V. access or unexpected hospital admissions.  NO malignant hyperthermia and or pseudocholinesterase deficiency.  No history of blood transfusions     STOP BANG " 2    The patient is not a Taoism and will accept blood and blood products if medically indicated.   Type and screen sent.     Past Medical History:   Diagnosis Date    Acute non-ST segment elevation myocardial infarction (Multi) 08/28/2022    Anxiety 12/26/2023    Chest pain 09/08/2023    Chronic obstructive pulmonary disease (Multi) 09/08/2023    Elevated liver enzymes 09/08/2023    Hepatomegaly 08/14/2023    History of hypertension 12/30/2023    Hyperlipidemia 09/08/2023    Macrocytosis without anemia 09/08/2023    Palpitations 09/08/2023    Prediabetes 01/27/2023    Rhinitis 12/30/2023    Shortness of breath 09/08/2023       Past Surgical History:   Procedure Laterality Date    APPENDECTOMY      CARDIAC CATHETERIZATION  2022    x2    CATARACT EXTRACTION Bilateral 2014    COLONOSCOPY  2008    COLONOSCOPY  2013    COLONOSCOPY  2018    DILATION AND CURETTAGE OF UTERUS  2012    VAGINA SURGERY  1974       Social History     Tobacco Use    Smoking status: Former     Types: Cigarettes     Passive exposure: Past    Smokeless tobacco: Never   Vaping Use    Vaping status: Never Used   Substance Use Topics    Alcohol use: Yes     Alcohol/week: 10.0 standard drinks of alcohol     Types: 10 Standard drinks or equivalent per week    Drug use: Never        Family History   Problem Relation Name Age of Onset    No Known Problems Mother      Stroke Father      Heart attack Father      No Known Problems Brother      Breast cancer Paternal Grandmother      Breast cancer Father's Sister         Allergies   Allergen Reactions    Penicillins Unknown, Anaphylaxis and Swelling    Hydrochlorothiazide Dizziness            PAT ROS:   Constitutional:   neg    Neuro/Psych:   neg    Eyes:   neg    Ears:   neg    Nose:   neg    Mouth:   neg    Throat:   neg    Neck:   neg    Cardio:    FUNCTIONAL 4 METS. DENIES SOB WALKING FROM PAT TO PARKING LOT  Respiratory:   neg    Endocrine:   neg    GI:   neg    :   neg    Musculoskeletal:  "  neg    Hematologic:   neg    Skin:  neg        Physical Exam  Vitals reviewed. Physical exam within normal limits.          PAT AIRWAY:   Airway:     Mallampati::  II    Neck ROM::  Full   SEVERAL MISSING TEETH      Heart Rate:  [77]   Temp:  [35.9 °C (96.7 °F)]   BP: (127)/(85)   Height:  [170.2 cm (5' 7\")]   Weight:  [89.8 kg (197 lb 14.4 oz)]   SpO2:  [98 %]      Lab Results   Component Value Date    WBC 7.9 10/21/2024    HGB 14.4 10/21/2024    HCT 43.3 10/21/2024    MCV 93 10/21/2024     10/21/2024      Lab Results   Component Value Date    GLUCOSE 116 (H) 10/21/2024    CALCIUM 9.4 10/21/2024     10/21/2024    K 4.0 10/21/2024    CO2 21 10/21/2024     10/21/2024    BUN 17 10/21/2024    CREATININE 0.83 10/21/2024      Lab Results   Component Value Date    ALT 34 10/21/2024    AST 29 10/21/2024    ALKPHOS 49 10/21/2024    BILITOT 0.6 10/21/2024      Lab Results   Component Value Date    INR 1.0 10/02/2024    INR 1.0 09/22/2023    PROTIME 10.3 10/02/2024    PROTIME 10.3 09/22/2023        EKG from 1/2024:  SEE DOCUMENTS FOR EKG       CT Chest 2024  FINDINGS:  CHEST:      LUNG/PLEURA/LARGE AIRWAYS:  The trachea and central airways are patent. No endobronchial lesion.  No consolidation, pleural effusion, or pneumothorax. No suspicious  pulmonary nodules identified.      VESSELS:  Aorta and pulmonary arteries are normal caliber.  Mild  atherosclerotic changes are noted of the aorta and branching vessels.  Moderate coronary artery calcifications are present.      HEART:  The heart is normal in size.  No pericardial effusion      MEDIASTINUM AND REGINALDO:  No mediastinal, hilar or axillary lymphadenopathy is present.  A  small sliding hiatal hernia is noted.      CHEST WALL AND LOWER NECK:  The soft tissues of the chest wall demonstrate no gross abnormality.  The visualized thyroid gland appears within normal limits.      ABDOMEN:      LIVER:  The liver is normal in size. There is diffuse " hypoattenuation  compatible with steatosis. A 4.3 x 3.0 x 3.7 cm heterogeneously  enhancing mass is seen of hepatic segment 2/3 (series 3, image 87 and  series 7, image 44) which is not significantly changed compared to  the prior MRI dated 09/09/2024 and minimally increased in size when  compared to the MRI dated 09/15/2023 where it measured 4.1 x 2.6 cm.  No other worrisome hepatic lesions identified.      BILE DUCTS:  The intrahepatic and extrahepatic ducts are not dilated.      GALLBLADDER:  No calcified stones. No wall thickening.      PANCREAS:  The pancreas appears unremarkable without evidence of ductal  dilatation or masses.      SPLEEN:  The spleen is normal in size without focal lesions.      ADRENAL GLANDS:  Bilateral adrenal glands appear normal.      KIDNEYS AND URETERS:  The left kidney is absent, consistent with patient's given history of  congenital absence. The right kidney is normal in size and  enhancement. No hydroureteronephrosis or nephroureterolithiasis.      PELVIS:      BLADDER:  The urinary bladder is decompressed, limited for evaluation.      REPRODUCTIVE ORGANS:  The uterus is present. There is a 2.6 cm cystic lesion of the right  adnexa (series 3, image 218).      BOWEL:  A small sliding hiatal hernia is noted, otherwise the stomach is  decompressed, limited for evaluation. The small and large bowel  demonstrate no abnormal bowel thickening or dilatation. The appendix  is not definitively visualized. Scattered colonic diverticulosis  without evidence of acute diverticulitis.          VESSELS:  Mild atherosclerotic calcification of the abdominal aorta without  AAA. The IVC is unremarkable. The portal venous vasculature is patent.      PERITONEUM/RETROPERITONEUM/LYMPH NODES:  No ascites or free air, no fluid collection.  There are prominent  periportal lymph nodes measuring up to 1.0 cm (series 3, image 99)  which are not significantly changed compared to the prior examination.      BONE  AND SOFT TISSUE:  No suspicious osseous lesions are identified. Degenerative discogenic  disease is noted in the lower thoracic and lumbar spine.  The  abdominal wall soft tissues appear normal.      IMPRESSION:  Liver lesion of unknown primary:  1. When compared to the 2023 MRI and CT there has been slight  interval increase in size of the left hepatic lobe lesion consistent  with patient's biopsy-proven adenocarcinoma. No significant change  when compared with the recent MRI dated 09/09/2024. No definite sites  of metastatic disease elsewhere.  2. Diffuse hepatic steatosis, correlate with liver function tests.  3. Cystic lesion of the right adnexa measuring 2.6 cm, further  evaluation with a pelvic ultrasound is recommended.  4. Additional stable chronic and incidental findings described above.      Assessment and Plan:         Patient is a 73-year-old female scheduled for a with Dr. Burton on 12/3/24.  Patient has no active cardiac symptoms.   Patient denies any chest pain, tightness, heaviness, pressure, radiating pain, palpitations, irregular heartbeats, lightheadedness, cough, congestion, shortness of breath, ROSALES, PND, near syncope, weight loss or gain.     RCRI  2  ,10 % Risk of MACE    Cardiology:  -- History of STEMI: being monitored by Dr. Bedolla in cardiology. Follows up cardiology annually. Patient is asymptomatic. On low dose ASA. Instruct to continue perioperatively since benefits outweigh the risks    Pulmonology:  -- History of COPD. Instruct to take control inhalers on DOS    Hematology:  Patient instructed to ambulate as soon as possible postoperatively to decrease thromboembolic risk.   Initiate mechanical DVT prophylaxis as soon as possible and initiate chemical prophylaxis when deemed safe from a bleeding standpoint post surgery.       Manueli: 6    Renal:  -- Recommendations to avoid nephrotoxic drugs and carefully monitor fluid status to maintain euvolemia. Use dose adjusted medications as  needed for the underlying level of renal function.    MEDS: patient in monjuora. Instruct to hold it 7 days prior to surgery    Risk assessment complete.  Patient is scheduled for a low surgical risk procedure.        Preoperative medication instructions were provided and reviewed with the patient.  Any additional testing or evaluation was explained to the patient.  Nothing by mouth instructions were discussed and patient's questions were answered prior to conclusion to this encounter.  Patient verbalized understanding of preoperative instructions given in preadmission testing; discharge instructions available in EMR.    This note was dictated by a speech recognition.  Minor errors may have been detected in a speech recognition.

## 2024-11-22 LAB — SCAN RESULT: NORMAL

## 2024-12-02 ENCOUNTER — ANESTHESIA EVENT (OUTPATIENT)
Dept: OPERATING ROOM | Facility: HOSPITAL | Age: 74
End: 2024-12-02
Payer: MEDICARE

## 2024-12-02 ENCOUNTER — APPOINTMENT (OUTPATIENT)
Dept: HEMATOLOGY/ONCOLOGY | Facility: CLINIC | Age: 74
End: 2024-12-02
Payer: COMMERCIAL

## 2024-12-02 PROBLEM — I21.4 NON-ST ELEVATION MYOCARDIAL INFARCTION (NSTEMI) (MULTI): Status: ACTIVE | Noted: 2024-12-02

## 2024-12-02 NOTE — ANESTHESIA PREPROCEDURE EVALUATION
Patient: Elsa Castellano    Procedure Information       Date/Time: 12/03/24 1000    Procedures:       Robot-Assisted Left Lateral Liver Resection      Robot-Assisted Lymphadenectomy    Location: [facility] OR 17 / [facility] OR    Surgeons: Gilbert Burton MD            Relevant Problems   Anesthesia (within normal limits)      Cardiac   (+) Hyperlipidemia   (+) Non-ST elevation myocardial infarction (NSTEMI) (Multi)      Pulmonary   (+) Chronic obstructive pulmonary disease (Multi)      Neuro   (+) Anxiety      GI (within normal limits)      /Renal (within normal limits)      Liver   (+) Adenocarcinoma determined by biopsy of bile duct (Multi)   (+) Hepatomegaly      Endocrine (within normal limits)   (+) Obesity with body mass index 30 or greater      Hematology (within normal limits)      Musculoskeletal (within normal limits)      HEENT (within normal limits)      ID (within normal limits)      Skin (within normal limits)      GYN (within normal limits)       Clinical information reviewed:                   NPO Detail:  No data recorded     PHYSICAL EXAM    Anesthesia Plan    History of general anesthesia?: yes  History of complications of general anesthesia?: no    ASA 3     general     intravenous induction   Postoperative administration of opioids is intended.  Trial extubation is planned.  Anesthetic plan and risks discussed with patient.  Use of blood products discussed with patient who consented to blood products.    Plan discussed with resident and attending.

## 2024-12-03 ENCOUNTER — ANESTHESIA (OUTPATIENT)
Dept: OPERATING ROOM | Facility: HOSPITAL | Age: 74
End: 2024-12-03
Payer: MEDICARE

## 2024-12-03 ENCOUNTER — HOSPITAL ENCOUNTER (INPATIENT)
Facility: HOSPITAL | Age: 74
End: 2024-12-03
Attending: SURGERY | Admitting: SURGERY
Payer: MEDICARE

## 2024-12-03 DIAGNOSIS — C24.0: Primary | ICD-10-CM

## 2024-12-03 LAB
ALBUMIN SERPL BCP-MCNC: 3.6 G/DL (ref 3.4–5)
ALP SERPL-CCNC: 45 U/L (ref 33–136)
ALT SERPL W P-5'-P-CCNC: 416 U/L (ref 7–45)
ANION GAP BLDA CALCULATED.4IONS-SCNC: 10 MMO/L (ref 10–25)
ANION GAP BLDA CALCULATED.4IONS-SCNC: 15 MMO/L (ref 10–25)
ANION GAP SERPL CALC-SCNC: 14 MMOL/L (ref 10–20)
AST SERPL W P-5'-P-CCNC: 479 U/L (ref 9–39)
BASE EXCESS BLDA CALC-SCNC: -6.9 MMOL/L (ref -2–3)
BASE EXCESS BLDA CALC-SCNC: -7.2 MMOL/L (ref -2–3)
BILIRUB SERPL-MCNC: 0.6 MG/DL (ref 0–1.2)
BODY TEMPERATURE: 37 DEGREES CELSIUS
BODY TEMPERATURE: 37 DEGREES CELSIUS
BUN SERPL-MCNC: 18 MG/DL (ref 6–23)
CA-I BLDA-SCNC: 1.09 MMOL/L (ref 1.1–1.33)
CA-I BLDA-SCNC: 1.18 MMOL/L (ref 1.1–1.33)
CALCIUM SERPL-MCNC: 8.8 MG/DL (ref 8.6–10.6)
CHLORIDE BLDA-SCNC: 107 MMOL/L (ref 98–107)
CHLORIDE BLDA-SCNC: 109 MMOL/L (ref 98–107)
CHLORIDE SERPL-SCNC: 110 MMOL/L (ref 98–107)
CO2 SERPL-SCNC: 18 MMOL/L (ref 21–32)
CREAT SERPL-MCNC: 0.91 MG/DL (ref 0.5–1.05)
EGFRCR SERPLBLD CKD-EPI 2021: 66 ML/MIN/1.73M*2
ERYTHROCYTE [DISTWIDTH] IN BLOOD BY AUTOMATED COUNT: 13.6 % (ref 11.5–14.5)
GLUCOSE BLDA-MCNC: 166 MG/DL (ref 74–99)
GLUCOSE BLDA-MCNC: 187 MG/DL (ref 74–99)
GLUCOSE SERPL-MCNC: 164 MG/DL (ref 74–99)
HCO3 BLDA-SCNC: 18 MMOL/L (ref 22–26)
HCO3 BLDA-SCNC: 18.5 MMOL/L (ref 22–26)
HCT VFR BLD AUTO: 40.8 % (ref 36–46)
HCT VFR BLD EST: 39 % (ref 36–46)
HCT VFR BLD EST: 42 % (ref 36–46)
HGB BLD-MCNC: 14 G/DL (ref 12–16)
HGB BLDA-MCNC: 12.9 G/DL (ref 12–16)
HGB BLDA-MCNC: 14.1 G/DL (ref 12–16)
INHALED O2 CONCENTRATION: 50 %
INHALED O2 CONCENTRATION: 52 %
LACTATE BLDA-SCNC: 1.2 MMOL/L (ref 0.4–2)
LACTATE BLDA-SCNC: 2.3 MMOL/L (ref 0.4–2)
MCH RBC QN AUTO: 31.5 PG (ref 26–34)
MCHC RBC AUTO-ENTMCNC: 34.3 G/DL (ref 32–36)
MCV RBC AUTO: 92 FL (ref 80–100)
NRBC BLD-RTO: 0 /100 WBCS (ref 0–0)
OXYHGB MFR BLDA: 97.5 % (ref 94–98)
OXYHGB MFR BLDA: 98.3 % (ref 94–98)
PCO2 BLDA: 35 MM HG (ref 38–42)
PCO2 BLDA: 36 MM HG (ref 38–42)
PH BLDA: 7.32 PH (ref 7.38–7.42)
PH BLDA: 7.32 PH (ref 7.38–7.42)
PLATELET # BLD AUTO: 216 X10*3/UL (ref 150–450)
PO2 BLDA: 169 MM HG (ref 85–95)
PO2 BLDA: 197 MM HG (ref 85–95)
POTASSIUM BLDA-SCNC: 3.8 MMOL/L (ref 3.5–5.3)
POTASSIUM BLDA-SCNC: 4.5 MMOL/L (ref 3.5–5.3)
POTASSIUM SERPL-SCNC: 4.4 MMOL/L (ref 3.5–5.3)
PROT SERPL-MCNC: 5.9 G/DL (ref 6.4–8.2)
RBC # BLD AUTO: 4.44 X10*6/UL (ref 4–5.2)
SAO2 % BLDA: 100 % (ref 94–100)
SAO2 % BLDA: 100 % (ref 94–100)
SODIUM BLDA-SCNC: 134 MMOL/L (ref 136–145)
SODIUM BLDA-SCNC: 135 MMOL/L (ref 136–145)
SODIUM SERPL-SCNC: 138 MMOL/L (ref 136–145)
WBC # BLD AUTO: 14.3 X10*3/UL (ref 4.4–11.3)

## 2024-12-03 PROCEDURE — 2500000004 HC RX 250 GENERAL PHARMACY W/ HCPCS (ALT 636 FOR OP/ED): Performed by: SURGERY

## 2024-12-03 PROCEDURE — 38747 REMOVE ABDOMINAL LYMPH NODES: CPT | Performed by: SURGERY

## 2024-12-03 PROCEDURE — 3600000017 HC OR TIME - EACH INCREMENTAL 1 MINUTE - PROCEDURE LEVEL SIX: Performed by: SURGERY

## 2024-12-03 PROCEDURE — 76998 US GUIDE INTRAOP: CPT | Performed by: SURGERY

## 2024-12-03 PROCEDURE — 2500000004 HC RX 250 GENERAL PHARMACY W/ HCPCS (ALT 636 FOR OP/ED): Performed by: STUDENT IN AN ORGANIZED HEALTH CARE EDUCATION/TRAINING PROGRAM

## 2024-12-03 PROCEDURE — 3700000001 HC GENERAL ANESTHESIA TIME - INITIAL BASE CHARGE: Performed by: SURGERY

## 2024-12-03 PROCEDURE — 85027 COMPLETE CBC AUTOMATED: CPT | Performed by: STUDENT IN AN ORGANIZED HEALTH CARE EDUCATION/TRAINING PROGRAM

## 2024-12-03 PROCEDURE — 84075 ASSAY ALKALINE PHOSPHATASE: CPT | Performed by: STUDENT IN AN ORGANIZED HEALTH CARE EDUCATION/TRAINING PROGRAM

## 2024-12-03 PROCEDURE — 96372 THER/PROPH/DIAG INJ SC/IM: CPT | Performed by: SURGERY

## 2024-12-03 PROCEDURE — 2500000004 HC RX 250 GENERAL PHARMACY W/ HCPCS (ALT 636 FOR OP/ED)

## 2024-12-03 PROCEDURE — 36415 COLL VENOUS BLD VENIPUNCTURE: CPT | Performed by: ANESTHESIOLOGY

## 2024-12-03 PROCEDURE — 7100000001 HC RECOVERY ROOM TIME - INITIAL BASE CHARGE: Performed by: SURGERY

## 2024-12-03 PROCEDURE — 84132 ASSAY OF SERUM POTASSIUM: CPT | Performed by: STUDENT IN AN ORGANIZED HEALTH CARE EDUCATION/TRAINING PROGRAM

## 2024-12-03 PROCEDURE — 84132 ASSAY OF SERUM POTASSIUM: CPT

## 2024-12-03 PROCEDURE — 2500000005 HC RX 250 GENERAL PHARMACY W/O HCPCS

## 2024-12-03 PROCEDURE — 1200000003 HC ONCOLOGY  ROOM WITH TELEMETRY DAILY

## 2024-12-03 PROCEDURE — C1760 CLOSURE DEV, VASC: HCPCS | Performed by: SURGERY

## 2024-12-03 PROCEDURE — 07BD4ZX EXCISION OF AORTIC LYMPHATIC, PERCUTANEOUS ENDOSCOPIC APPROACH, DIAGNOSTIC: ICD-10-PCS | Performed by: SURGERY

## 2024-12-03 PROCEDURE — 3700000002 HC GENERAL ANESTHESIA TIME - EACH INCREMENTAL 1 MINUTE: Performed by: SURGERY

## 2024-12-03 PROCEDURE — 36620 INSERTION CATHETER ARTERY: CPT

## 2024-12-03 PROCEDURE — 37799 UNLISTED PX VASCULAR SURGERY: CPT | Performed by: STUDENT IN AN ORGANIZED HEALTH CARE EDUCATION/TRAINING PROGRAM

## 2024-12-03 PROCEDURE — 47120 PARTIAL REMOVAL OF LIVER: CPT | Performed by: SURGERY

## 2024-12-03 PROCEDURE — P9045 ALBUMIN (HUMAN), 5%, 250 ML: HCPCS | Mod: JZ,JG

## 2024-12-03 PROCEDURE — 0FB04ZZ EXCISION OF LIVER, PERCUTANEOUS ENDOSCOPIC APPROACH: ICD-10-PCS | Performed by: SURGERY

## 2024-12-03 PROCEDURE — 0DBW4ZX EXCISION OF PERITONEUM, PERCUTANEOUS ENDOSCOPIC APPROACH, DIAGNOSTIC: ICD-10-PCS | Performed by: SURGERY

## 2024-12-03 PROCEDURE — 2720000007 HC OR 272 NO HCPCS: Performed by: SURGERY

## 2024-12-03 PROCEDURE — 2780000003 HC OR 278 NO HCPCS: Performed by: SURGERY

## 2024-12-03 PROCEDURE — 3600000018 HC OR TIME - INITIAL BASE CHARGE - PROCEDURE LEVEL SIX: Performed by: SURGERY

## 2024-12-03 PROCEDURE — 7100000002 HC RECOVERY ROOM TIME - EACH INCREMENTAL 1 MINUTE: Performed by: SURGERY

## 2024-12-03 PROCEDURE — 8E0W4CZ ROBOTIC ASSISTED PROCEDURE OF TRUNK REGION, PERCUTANEOUS ENDOSCOPIC APPROACH: ICD-10-PCS | Performed by: SURGERY

## 2024-12-03 RX ORDER — HYDROMORPHONE HCL/0.9% NACL/PF 15 MG/30ML
PATIENT CONTROLLED ANALGESIA SYRINGE INTRAVENOUS CONTINUOUS
Status: DISCONTINUED | OUTPATIENT
Start: 2024-12-03 | End: 2024-12-06

## 2024-12-03 RX ORDER — ALBUMIN HUMAN 50 G/1000ML
SOLUTION INTRAVENOUS AS NEEDED
Status: DISCONTINUED | OUTPATIENT
Start: 2024-12-03 | End: 2024-12-03

## 2024-12-03 RX ORDER — METHOCARBAMOL 100 MG/ML
1000 INJECTION, SOLUTION INTRAMUSCULAR; INTRAVENOUS ONCE
Status: COMPLETED | OUTPATIENT
Start: 2024-12-03 | End: 2024-12-03

## 2024-12-03 RX ORDER — HYDROMORPHONE HYDROCHLORIDE 1 MG/ML
INJECTION, SOLUTION INTRAMUSCULAR; INTRAVENOUS; SUBCUTANEOUS AS NEEDED
Status: DISCONTINUED | OUTPATIENT
Start: 2024-12-03 | End: 2024-12-03

## 2024-12-03 RX ORDER — SODIUM CHLORIDE, SODIUM LACTATE, POTASSIUM CHLORIDE, CALCIUM CHLORIDE 600; 310; 30; 20 MG/100ML; MG/100ML; MG/100ML; MG/100ML
100 INJECTION, SOLUTION INTRAVENOUS CONTINUOUS
Status: ACTIVE | OUTPATIENT
Start: 2024-12-03 | End: 2024-12-03

## 2024-12-03 RX ORDER — ONDANSETRON HYDROCHLORIDE 2 MG/ML
INJECTION, SOLUTION INTRAVENOUS AS NEEDED
Status: DISCONTINUED | OUTPATIENT
Start: 2024-12-03 | End: 2024-12-03

## 2024-12-03 RX ORDER — HYDRALAZINE HYDROCHLORIDE 20 MG/ML
5 INJECTION INTRAMUSCULAR; INTRAVENOUS EVERY 30 MIN PRN
Status: DISCONTINUED | OUTPATIENT
Start: 2024-12-03 | End: 2024-12-03 | Stop reason: HOSPADM

## 2024-12-03 RX ORDER — NALOXONE HYDROCHLORIDE 0.4 MG/ML
0.2 INJECTION, SOLUTION INTRAMUSCULAR; INTRAVENOUS; SUBCUTANEOUS AS NEEDED
Status: DISCONTINUED | OUTPATIENT
Start: 2024-12-03 | End: 2024-12-06

## 2024-12-03 RX ORDER — ROCURONIUM BROMIDE 10 MG/ML
INJECTION, SOLUTION INTRAVENOUS AS NEEDED
Status: DISCONTINUED | OUTPATIENT
Start: 2024-12-03 | End: 2024-12-03

## 2024-12-03 RX ORDER — INDOCYANINE GREEN AND WATER 25 MG
KIT INJECTION AS NEEDED
Status: DISCONTINUED | OUTPATIENT
Start: 2024-12-03 | End: 2024-12-03

## 2024-12-03 RX ORDER — FENTANYL CITRATE 50 UG/ML
INJECTION, SOLUTION INTRAMUSCULAR; INTRAVENOUS AS NEEDED
Status: DISCONTINUED | OUTPATIENT
Start: 2024-12-03 | End: 2024-12-03

## 2024-12-03 RX ORDER — SODIUM CHLORIDE, SODIUM LACTATE, POTASSIUM CHLORIDE, CALCIUM CHLORIDE 600; 310; 30; 20 MG/100ML; MG/100ML; MG/100ML; MG/100ML
INJECTION, SOLUTION INTRAVENOUS CONTINUOUS PRN
Status: DISCONTINUED | OUTPATIENT
Start: 2024-12-03 | End: 2024-12-03

## 2024-12-03 RX ORDER — LEVOFLOXACIN 5 MG/ML
500 INJECTION, SOLUTION INTRAVENOUS ONCE
Status: COMPLETED | OUTPATIENT
Start: 2024-12-03 | End: 2024-12-03

## 2024-12-03 RX ORDER — MIDAZOLAM HYDROCHLORIDE 1 MG/ML
INJECTION INTRAMUSCULAR; INTRAVENOUS AS NEEDED
Status: DISCONTINUED | OUTPATIENT
Start: 2024-12-03 | End: 2024-12-03

## 2024-12-03 RX ORDER — OXYCODONE HYDROCHLORIDE 5 MG/1
5 TABLET ORAL EVERY 4 HOURS PRN
Status: DISCONTINUED | OUTPATIENT
Start: 2024-12-03 | End: 2024-12-03 | Stop reason: HOSPADM

## 2024-12-03 RX ORDER — PANTOPRAZOLE SODIUM 40 MG/10ML
40 INJECTION, POWDER, LYOPHILIZED, FOR SOLUTION INTRAVENOUS DAILY
Status: DISPENSED | OUTPATIENT
Start: 2024-12-04

## 2024-12-03 RX ORDER — ATORVASTATIN CALCIUM 40 MG/1
40 TABLET, FILM COATED ORAL DAILY
Status: DISPENSED | OUTPATIENT
Start: 2024-12-04

## 2024-12-03 RX ORDER — PHENYLEPHRINE HCL IN 0.9% NACL 0.4MG/10ML
SYRINGE (ML) INTRAVENOUS AS NEEDED
Status: DISCONTINUED | OUTPATIENT
Start: 2024-12-03 | End: 2024-12-03

## 2024-12-03 RX ORDER — PROPOFOL 10 MG/ML
INJECTION, EMULSION INTRAVENOUS AS NEEDED
Status: DISCONTINUED | OUTPATIENT
Start: 2024-12-03 | End: 2024-12-03

## 2024-12-03 RX ORDER — HEPARIN SODIUM 5000 [USP'U]/ML
5000 INJECTION, SOLUTION INTRAVENOUS; SUBCUTANEOUS ONCE
Status: COMPLETED | OUTPATIENT
Start: 2024-12-03 | End: 2024-12-03

## 2024-12-03 RX ORDER — ASPIRIN 81 MG/1
81 TABLET ORAL DAILY
Status: DISPENSED | OUTPATIENT
Start: 2024-12-04

## 2024-12-03 RX ORDER — ACETAMINOPHEN 10 MG/ML
INJECTION, SOLUTION INTRAVENOUS AS NEEDED
Status: DISCONTINUED | OUTPATIENT
Start: 2024-12-03 | End: 2024-12-03

## 2024-12-03 RX ORDER — AMLODIPINE BESYLATE 10 MG/1
10 TABLET ORAL DAILY
Status: DISPENSED | OUTPATIENT
Start: 2024-12-04

## 2024-12-03 RX ORDER — ONDANSETRON HYDROCHLORIDE 2 MG/ML
4 INJECTION, SOLUTION INTRAVENOUS ONCE AS NEEDED
Status: DISCONTINUED | OUTPATIENT
Start: 2024-12-03 | End: 2024-12-03 | Stop reason: HOSPADM

## 2024-12-03 RX ORDER — SODIUM CHLORIDE, SODIUM LACTATE, POTASSIUM CHLORIDE, CALCIUM CHLORIDE 600; 310; 30; 20 MG/100ML; MG/100ML; MG/100ML; MG/100ML
100 INJECTION, SOLUTION INTRAVENOUS CONTINUOUS
Status: DISCONTINUED | OUTPATIENT
Start: 2024-12-03 | End: 2024-12-04

## 2024-12-03 RX ORDER — ALBUTEROL SULFATE 0.83 MG/ML
2.5 SOLUTION RESPIRATORY (INHALATION) ONCE AS NEEDED
Status: DISCONTINUED | OUTPATIENT
Start: 2024-12-03 | End: 2024-12-03 | Stop reason: HOSPADM

## 2024-12-03 RX ORDER — METOPROLOL TARTRATE 1 MG/ML
INJECTION, SOLUTION INTRAVENOUS AS NEEDED
Status: DISCONTINUED | OUTPATIENT
Start: 2024-12-03 | End: 2024-12-03

## 2024-12-03 RX ORDER — DEXTROSE 50 % IN WATER (D50W) INTRAVENOUS SYRINGE
12.5
Status: ACTIVE | OUTPATIENT
Start: 2024-12-03

## 2024-12-03 RX ORDER — METOPROLOL TARTRATE 1 MG/ML
5 INJECTION, SOLUTION INTRAVENOUS EVERY 6 HOURS
Status: DISCONTINUED | OUTPATIENT
Start: 2024-12-03 | End: 2024-12-06

## 2024-12-03 RX ORDER — DEXTROSE 50 % IN WATER (D50W) INTRAVENOUS SYRINGE
25
Status: ACTIVE | OUTPATIENT
Start: 2024-12-03

## 2024-12-03 RX ORDER — ONDANSETRON HYDROCHLORIDE 2 MG/ML
4 INJECTION, SOLUTION INTRAVENOUS EVERY 6 HOURS PRN
Status: ACTIVE | OUTPATIENT
Start: 2024-12-03

## 2024-12-03 RX ORDER — CALCIUM CHLORIDE INJECTION 100 MG/ML
INJECTION, SOLUTION INTRAVENOUS AS NEEDED
Status: DISCONTINUED | OUTPATIENT
Start: 2024-12-03 | End: 2024-12-03

## 2024-12-03 RX ORDER — HYDROMORPHONE HYDROCHLORIDE 0.2 MG/ML
0.2 INJECTION INTRAMUSCULAR; INTRAVENOUS; SUBCUTANEOUS EVERY 5 MIN PRN
Status: DISCONTINUED | OUTPATIENT
Start: 2024-12-03 | End: 2024-12-03 | Stop reason: HOSPADM

## 2024-12-03 RX ORDER — LABETALOL HYDROCHLORIDE 5 MG/ML
5 INJECTION, SOLUTION INTRAVENOUS ONCE AS NEEDED
Status: DISCONTINUED | OUTPATIENT
Start: 2024-12-03 | End: 2024-12-03 | Stop reason: HOSPADM

## 2024-12-03 RX ORDER — LIDOCAINE HYDROCHLORIDE 20 MG/ML
INJECTION, SOLUTION INFILTRATION; PERINEURAL AS NEEDED
Status: DISCONTINUED | OUTPATIENT
Start: 2024-12-03 | End: 2024-12-03

## 2024-12-03 SDOH — SOCIAL STABILITY: SOCIAL INSECURITY
WITHIN THE LAST YEAR, HAVE YOU BEEN RAPED OR FORCED TO HAVE ANY KIND OF SEXUAL ACTIVITY BY YOUR PARTNER OR EX-PARTNER?: NO

## 2024-12-03 SDOH — ECONOMIC STABILITY: FOOD INSECURITY: WITHIN THE PAST 12 MONTHS, THE FOOD YOU BOUGHT JUST DIDN'T LAST AND YOU DIDN'T HAVE MONEY TO GET MORE.: NEVER TRUE

## 2024-12-03 SDOH — ECONOMIC STABILITY: INCOME INSECURITY: IN THE PAST 12 MONTHS HAS THE ELECTRIC, GAS, OIL, OR WATER COMPANY THREATENED TO SHUT OFF SERVICES IN YOUR HOME?: NO

## 2024-12-03 SDOH — SOCIAL STABILITY: SOCIAL INSECURITY: WITHIN THE LAST YEAR, HAVE YOU BEEN HUMILIATED OR EMOTIONALLY ABUSED IN OTHER WAYS BY YOUR PARTNER OR EX-PARTNER?: NO

## 2024-12-03 SDOH — ECONOMIC STABILITY: FOOD INSECURITY: WITHIN THE PAST 12 MONTHS, YOU WORRIED THAT YOUR FOOD WOULD RUN OUT BEFORE YOU GOT THE MONEY TO BUY MORE.: NEVER TRUE

## 2024-12-03 SDOH — SOCIAL STABILITY: SOCIAL INSECURITY
WITHIN THE LAST YEAR, HAVE YOU BEEN KICKED, HIT, SLAPPED, OR OTHERWISE PHYSICALLY HURT BY YOUR PARTNER OR EX-PARTNER?: NO

## 2024-12-03 SDOH — SOCIAL STABILITY: SOCIAL INSECURITY: DO YOU FEEL ANYONE HAS EXPLOITED OR TAKEN ADVANTAGE OF YOU FINANCIALLY OR OF YOUR PERSONAL PROPERTY?: NO

## 2024-12-03 SDOH — SOCIAL STABILITY: SOCIAL INSECURITY: DO YOU FEEL UNSAFE GOING BACK TO THE PLACE WHERE YOU ARE LIVING?: NO

## 2024-12-03 SDOH — SOCIAL STABILITY: SOCIAL INSECURITY: HAVE YOU HAD THOUGHTS OF HARMING ANYONE ELSE?: NO

## 2024-12-03 SDOH — SOCIAL STABILITY: SOCIAL INSECURITY: ABUSE: ADULT

## 2024-12-03 SDOH — SOCIAL STABILITY: SOCIAL INSECURITY: WITHIN THE LAST YEAR, HAVE YOU BEEN AFRAID OF YOUR PARTNER OR EX-PARTNER?: NO

## 2024-12-03 SDOH — SOCIAL STABILITY: SOCIAL INSECURITY: HAS ANYONE EVER THREATENED TO HURT YOUR FAMILY OR YOUR PETS?: NO

## 2024-12-03 SDOH — SOCIAL STABILITY: SOCIAL INSECURITY: ARE THERE ANY APPARENT SIGNS OF INJURIES/BEHAVIORS THAT COULD BE RELATED TO ABUSE/NEGLECT?: NO

## 2024-12-03 SDOH — SOCIAL STABILITY: SOCIAL INSECURITY: WERE YOU ABLE TO COMPLETE ALL THE BEHAVIORAL HEALTH SCREENINGS?: YES

## 2024-12-03 SDOH — SOCIAL STABILITY: SOCIAL INSECURITY: ARE YOU OR HAVE YOU BEEN THREATENED OR ABUSED PHYSICALLY, EMOTIONALLY, OR SEXUALLY BY ANYONE?: NO

## 2024-12-03 SDOH — SOCIAL STABILITY: SOCIAL INSECURITY: DOES ANYONE TRY TO KEEP YOU FROM HAVING/CONTACTING OTHER FRIENDS OR DOING THINGS OUTSIDE YOUR HOME?: NO

## 2024-12-03 ASSESSMENT — COGNITIVE AND FUNCTIONAL STATUS - GENERAL
DAILY ACTIVITIY SCORE: 24
CLIMB 3 TO 5 STEPS WITH RAILING: A LITTLE
MOBILITY SCORE: 23
PATIENT BASELINE BEDBOUND: NO

## 2024-12-03 ASSESSMENT — PAIN - FUNCTIONAL ASSESSMENT
PAIN_FUNCTIONAL_ASSESSMENT: 0-10
PAIN_FUNCTIONAL_ASSESSMENT: UNABLE TO SELF-REPORT
PAIN_FUNCTIONAL_ASSESSMENT: 0-10
PAIN_FUNCTIONAL_ASSESSMENT: 0-10
PAIN_FUNCTIONAL_ASSESSMENT: UNABLE TO SELF-REPORT
PAIN_FUNCTIONAL_ASSESSMENT: 0-10
PAIN_FUNCTIONAL_ASSESSMENT: UNABLE TO SELF-REPORT
PAIN_FUNCTIONAL_ASSESSMENT: 0-10
PAIN_FUNCTIONAL_ASSESSMENT: UNABLE TO SELF-REPORT
PAIN_FUNCTIONAL_ASSESSMENT: UNABLE TO SELF-REPORT

## 2024-12-03 ASSESSMENT — PAIN SCALES - GENERAL
PAINLEVEL_OUTOF10: 5 - MODERATE PAIN
PAINLEVEL_OUTOF10: 5 - MODERATE PAIN
PAINLEVEL_OUTOF10: 4
PAINLEVEL_OUTOF10: 8
PAINLEVEL_OUTOF10: 10 - WORST POSSIBLE PAIN
PAINLEVEL_OUTOF10: 5 - MODERATE PAIN
PAINLEVEL_OUTOF10: 8
PAINLEVEL_OUTOF10: 7
PAINLEVEL_OUTOF10: 0 - NO PAIN
PAINLEVEL_OUTOF10: 10 - WORST POSSIBLE PAIN
PAIN_LEVEL: 7
PAINLEVEL_OUTOF10: 10 - WORST POSSIBLE PAIN

## 2024-12-03 ASSESSMENT — ACTIVITIES OF DAILY LIVING (ADL)
WALKS IN HOME: INDEPENDENT
HEARING - RIGHT EAR: FUNCTIONAL
HEARING - LEFT EAR: FUNCTIONAL
DRESSING YOURSELF: INDEPENDENT
ASSISTIVE_DEVICE: EYEGLASSES
JUDGMENT_ADEQUATE_SAFELY_COMPLETE_DAILY_ACTIVITIES: YES
FEEDING YOURSELF: INDEPENDENT
TOILETING: INDEPENDENT
BATHING: INDEPENDENT
LACK_OF_TRANSPORTATION: NO
GROOMING: INDEPENDENT
ADEQUATE_TO_COMPLETE_ADL: YES
PATIENT'S MEMORY ADEQUATE TO SAFELY COMPLETE DAILY ACTIVITIES?: YES

## 2024-12-03 ASSESSMENT — LIFESTYLE VARIABLES
HOW OFTEN DO YOU HAVE 6 OR MORE DRINKS ON ONE OCCASION: NEVER
AUDIT-C TOTAL SCORE: 4
SKIP TO QUESTIONS 9-10: 1
AUDIT-C TOTAL SCORE: 4
HOW MANY STANDARD DRINKS CONTAINING ALCOHOL DO YOU HAVE ON A TYPICAL DAY: 1 OR 2
HOW OFTEN DO YOU HAVE A DRINK CONTAINING ALCOHOL: 4 OR MORE TIMES A WEEK

## 2024-12-03 ASSESSMENT — PATIENT HEALTH QUESTIONNAIRE - PHQ9
2. FEELING DOWN, DEPRESSED OR HOPELESS: NOT AT ALL
1. LITTLE INTEREST OR PLEASURE IN DOING THINGS: NOT AT ALL
SUM OF ALL RESPONSES TO PHQ9 QUESTIONS 1 & 2: 0

## 2024-12-03 NOTE — ANESTHESIA PROCEDURE NOTES
Airway  Date/Time: 12/3/2024 11:03 AM  Urgency: elective    Airway not difficult    Staffing  Performed: resident   Authorized by: Oumar Atwood MD    Performed by: Brady Shaikh MD  Patient location during procedure: OR    Indications and Patient Condition  Indications for airway management: anesthesia and airway protection  Spontaneous ventilation: present  Sedation level: deep  Preoxygenated: yes  Patient position: sniffing  MILS maintained throughout  Mask difficulty assessment: 1 - vent by mask  Planned trial extubation    Final Airway Details  Final airway type: endotracheal airway      Successful airway: ETT  Cuffed: yes   Successful intubation technique: direct laryngoscopy  Facilitating devices/methods: intubating stylet  Endotracheal tube insertion site: oral  Blade: Masha  Blade size: #3  ETT size (mm): 7.0  Cormack-Lehane Classification: grade I - full view of glottis  Placement verified by: capnometry   Measured from: teeth  ETT to teeth (cm): 22  Number of attempts at approach: 1

## 2024-12-03 NOTE — BRIEF OP NOTE
[facility] [place]  DEPARTMENT OF SURGERY    BRIEF POSTOPERATIVE NOTE    Date: 12/3/2024  OR Location: [facility] OR    Patient Name: Elsa Castellano  MRN: [id number]  : 1950  Age: 73 y.o.   Sex: female    --------------------------------------------------------------------------    PREOPERATIVE DIAGNOSIS:  Liver mass    POSTOPERATIVE DIAGNOSIS:  Liver mass    PROCEDURE:  Robotic-assisted converted to open partial hepatectomy and portal lymphadenectomy    ATTENDING:  Gilbert Burton MD    RESIDENT, FELLOW, AND/OR ASSISTANT:  Francisco Gibbs MD PGY5    ANESTHESIA:  GETA    ESTIMATED BLOOD LOSS:  500 cc    FINDINGS:  Mass in segments 2 and 3. Bleeding difficult to control robotically, prompting conversion to open.    SPECIMENS:  Peritoneal fat  Liver segments 2 and 3  Hepatoportal lymph nodes    DRAINS:  LLQ drain which tracks posteriorly to cut edge of liver    COMPLICATIONS:  Intra-operative bleeding requiring conversion to open    DISPOSITION:  PACU    PATIENT CONDITION:  Satisfactory    Francisco Gibbs MD  General Surgery PGY5  Colorectal Surgery - [facility] [id number]

## 2024-12-03 NOTE — ANESTHESIA PROCEDURE NOTES
Peripheral IV  Date/Time: 12/3/2024 11:25 AM      Placement  Needle size: 16 G  Laterality: left  Location: forearm  Site prep: alcohol  Technique: ultrasound guided  Attempts: 1

## 2024-12-03 NOTE — ANESTHESIA PROCEDURE NOTES
Peripheral IV  Date/Time: 12/3/2024 11:15 AM      Placement  Needle size: 16 G  Laterality: right  Location: hand  Site prep: alcohol  Technique: anatomical landmarks  Attempts: 1

## 2024-12-03 NOTE — ANESTHESIA POSTPROCEDURE EVALUATION
LOV: 10/16/18  Patient stated for the past 5 years he has been having wrist pain. He said that the pain is in both wrists that goes toward this thumbs but that the right is worse than the left. He also has weakness in both wrists with right being worst than the left. He said recently it seems to be getting worse. Advised that he be seen and transferred to reception to schedule.   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    Anesthesia Post Evaluation    Patient location during evaluation: PACU  Patient participation: complete - patient participated  Level of consciousness: sleepy but conscious  Pain score: 7  Pain management: inadequate  Airway patency: patent  Cardiovascular status: acceptable  Respiratory status: acceptable  Hydration status: acceptable  Postoperative Nausea and Vomiting: none        No notable events documented.

## 2024-12-03 NOTE — ANESTHESIA PROCEDURE NOTES
Arterial Line:    Date/Time: 12/3/2024 11:21 AM    Staffing  Performed: resident   Authorized by: Oumar Atwood MD    Performed by: Brady Shaikh MD    An arterial line was placed. Procedure performed using surface landmarks.in the OR for the following indication(s): continuous blood pressure monitoring and blood sampling needed.    A 20 gauge (size), 1 and 3/8 inch (length), Angiocath (type) catheter was placed into the Right radial artery, secured by Tegaderm,   Seldinger technique not used.  Events:  greater than 3 attempts and patient tolerated procedure well with no complications.      Additional notes:  Initially attempted L radial rodrigo. Good pulse, site prepped and draped in normal sterile fashion. Seldinger technique attempted x2 with good pulsatile blood flow but unable to pass wire. Reattempted R radial rodrigo with good pulsatile blood flow, again unable to thread wire. Finally suceeded in threading catheter directly off needle, good wave form on monitor. Patient tolerated procedure well with no immediate complications.

## 2024-12-04 ENCOUNTER — OFFICE VISIT (OUTPATIENT)
Dept: SURGICAL ONCOLOGY | Facility: HOSPITAL | Age: 74
DRG: 406 | End: 2024-12-04
Payer: MEDICARE

## 2024-12-04 LAB
ALBUMIN SERPL BCP-MCNC: 3.3 G/DL (ref 3.4–5)
ALBUMIN SERPL BCP-MCNC: 3.6 G/DL (ref 3.4–5)
ALBUMIN SERPL BCP-MCNC: 4.2 G/DL (ref 3.4–5)
ALP SERPL-CCNC: 42 U/L (ref 33–136)
ALP SERPL-CCNC: 47 U/L (ref 33–136)
ALT SERPL W P-5'-P-CCNC: 316 U/L (ref 7–45)
ALT SERPL W P-5'-P-CCNC: 420 U/L (ref 7–45)
ANION GAP SERPL CALC-SCNC: 14 MMOL/L (ref 10–20)
ANION GAP SERPL CALC-SCNC: 15 MMOL/L (ref 10–20)
ANION GAP SERPL CALC-SCNC: 18 MMOL/L (ref 10–20)
APTT PPP: 21 SECONDS (ref 27–38)
AST SERPL W P-5'-P-CCNC: 401 U/L (ref 9–39)
AST SERPL W P-5'-P-CCNC: 546 U/L (ref 9–39)
BILIRUB SERPL-MCNC: 0.5 MG/DL (ref 0–1.2)
BILIRUB SERPL-MCNC: 0.7 MG/DL (ref 0–1.2)
BUN SERPL-MCNC: 27 MG/DL (ref 6–23)
BUN SERPL-MCNC: 33 MG/DL (ref 6–23)
BUN SERPL-MCNC: 40 MG/DL (ref 6–23)
CALCIUM SERPL-MCNC: 8.2 MG/DL (ref 8.6–10.6)
CALCIUM SERPL-MCNC: 8.7 MG/DL (ref 8.6–10.6)
CALCIUM SERPL-MCNC: 9.1 MG/DL (ref 8.6–10.6)
CHLORIDE SERPL-SCNC: 105 MMOL/L (ref 98–107)
CHLORIDE SERPL-SCNC: 107 MMOL/L (ref 98–107)
CHLORIDE SERPL-SCNC: 107 MMOL/L (ref 98–107)
CO2 SERPL-SCNC: 19 MMOL/L (ref 21–32)
CREAT SERPL-MCNC: 1.86 MG/DL (ref 0.5–1.05)
CREAT SERPL-MCNC: 2.07 MG/DL (ref 0.5–1.05)
CREAT SERPL-MCNC: 2.46 MG/DL (ref 0.5–1.05)
EGFRCR SERPLBLD CKD-EPI 2021: 20 ML/MIN/1.73M*2
EGFRCR SERPLBLD CKD-EPI 2021: 25 ML/MIN/1.73M*2
EGFRCR SERPLBLD CKD-EPI 2021: 28 ML/MIN/1.73M*2
ERYTHROCYTE [DISTWIDTH] IN BLOOD BY AUTOMATED COUNT: 14.2 % (ref 11.5–14.5)
GLUCOSE BLD MANUAL STRIP-MCNC: 156 MG/DL (ref 74–99)
GLUCOSE BLD MANUAL STRIP-MCNC: 181 MG/DL (ref 74–99)
GLUCOSE BLD MANUAL STRIP-MCNC: 190 MG/DL (ref 74–99)
GLUCOSE SERPL-MCNC: 134 MG/DL (ref 74–99)
GLUCOSE SERPL-MCNC: 174 MG/DL (ref 74–99)
GLUCOSE SERPL-MCNC: 240 MG/DL (ref 74–99)
HCT VFR BLD AUTO: 45.3 % (ref 36–46)
HGB BLD-MCNC: 14.4 G/DL (ref 12–16)
INR PPP: 1 (ref 0.9–1.1)
MCH RBC QN AUTO: 31.6 PG (ref 26–34)
MCHC RBC AUTO-ENTMCNC: 31.8 G/DL (ref 32–36)
MCV RBC AUTO: 99 FL (ref 80–100)
NRBC BLD-RTO: 0 /100 WBCS (ref 0–0)
PHOSPHATE SERPL-MCNC: 4 MG/DL (ref 2.5–4.9)
PHOSPHATE SERPL-MCNC: 5.9 MG/DL (ref 2.5–4.9)
PLATELET # BLD AUTO: 224 X10*3/UL (ref 150–450)
POTASSIUM SERPL-SCNC: 4.9 MMOL/L (ref 3.5–5.3)
POTASSIUM SERPL-SCNC: 5.4 MMOL/L (ref 3.5–5.3)
POTASSIUM SERPL-SCNC: 6 MMOL/L (ref 3.5–5.3)
PROT SERPL-MCNC: 5.8 G/DL (ref 6.4–8.2)
PROT SERPL-MCNC: 6.6 G/DL (ref 6.4–8.2)
PROTHROMBIN TIME: 11.8 SECONDS (ref 9.8–12.8)
RBC # BLD AUTO: 4.56 X10*6/UL (ref 4–5.2)
SODIUM SERPL-SCNC: 135 MMOL/L (ref 136–145)
SODIUM SERPL-SCNC: 136 MMOL/L (ref 136–145)
SODIUM SERPL-SCNC: 136 MMOL/L (ref 136–145)
WBC # BLD AUTO: 21.2 X10*3/UL (ref 4.4–11.3)

## 2024-12-04 PROCEDURE — 1200000003 HC ONCOLOGY  ROOM WITH TELEMETRY DAILY

## 2024-12-04 PROCEDURE — 2500000001 HC RX 250 WO HCPCS SELF ADMINISTERED DRUGS (ALT 637 FOR MEDICARE OP): Performed by: STUDENT IN AN ORGANIZED HEALTH CARE EDUCATION/TRAINING PROGRAM

## 2024-12-04 PROCEDURE — 2500000005 HC RX 250 GENERAL PHARMACY W/O HCPCS

## 2024-12-04 PROCEDURE — 94640 AIRWAY INHALATION TREATMENT: CPT

## 2024-12-04 PROCEDURE — 36415 COLL VENOUS BLD VENIPUNCTURE: CPT

## 2024-12-04 PROCEDURE — 2500000004 HC RX 250 GENERAL PHARMACY W/ HCPCS (ALT 636 FOR OP/ED): Performed by: STUDENT IN AN ORGANIZED HEALTH CARE EDUCATION/TRAINING PROGRAM

## 2024-12-04 PROCEDURE — 84100 ASSAY OF PHOSPHORUS: CPT

## 2024-12-04 PROCEDURE — 80069 RENAL FUNCTION PANEL: CPT | Mod: CCI | Performed by: NURSE PRACTITIONER

## 2024-12-04 PROCEDURE — 2500000002 HC RX 250 W HCPCS SELF ADMINISTERED DRUGS (ALT 637 FOR MEDICARE OP, ALT 636 FOR OP/ED)

## 2024-12-04 PROCEDURE — 85027 COMPLETE CBC AUTOMATED: CPT

## 2024-12-04 PROCEDURE — 2500000004 HC RX 250 GENERAL PHARMACY W/ HCPCS (ALT 636 FOR OP/ED)

## 2024-12-04 PROCEDURE — 93005 ELECTROCARDIOGRAM TRACING: CPT

## 2024-12-04 PROCEDURE — 85610 PROTHROMBIN TIME: CPT

## 2024-12-04 PROCEDURE — 2500000004 HC RX 250 GENERAL PHARMACY W/ HCPCS (ALT 636 FOR OP/ED): Performed by: NURSE PRACTITIONER

## 2024-12-04 PROCEDURE — 97162 PT EVAL MOD COMPLEX 30 MIN: CPT | Mod: GP

## 2024-12-04 PROCEDURE — 82947 ASSAY GLUCOSE BLOOD QUANT: CPT

## 2024-12-04 PROCEDURE — 97530 THERAPEUTIC ACTIVITIES: CPT | Mod: GP

## 2024-12-04 PROCEDURE — 2500000005 HC RX 250 GENERAL PHARMACY W/O HCPCS: Performed by: STUDENT IN AN ORGANIZED HEALTH CARE EDUCATION/TRAINING PROGRAM

## 2024-12-04 PROCEDURE — 80053 COMPREHEN METABOLIC PANEL: CPT

## 2024-12-04 PROCEDURE — 82435 ASSAY OF BLOOD CHLORIDE: CPT

## 2024-12-04 RX ORDER — HEPARIN SODIUM 5000 [USP'U]/ML
5000 INJECTION, SOLUTION INTRAVENOUS; SUBCUTANEOUS EVERY 8 HOURS
Status: DISCONTINUED | OUTPATIENT
Start: 2024-12-04 | End: 2024-12-07

## 2024-12-04 RX ORDER — DEXTROSE 50 % IN WATER (D50W) INTRAVENOUS SYRINGE
25 ONCE
Status: COMPLETED | OUTPATIENT
Start: 2024-12-04 | End: 2024-12-04

## 2024-12-04 RX ORDER — SODIUM CHLORIDE 9 MG/ML
125 INJECTION, SOLUTION INTRAVENOUS CONTINUOUS
Status: ACTIVE | OUTPATIENT
Start: 2024-12-04 | End: 2024-12-05

## 2024-12-04 RX ORDER — LIDOCAINE 560 MG/1
2 PATCH PERCUTANEOUS; TOPICAL; TRANSDERMAL DAILY
Status: DISPENSED | OUTPATIENT
Start: 2024-12-04

## 2024-12-04 RX ORDER — TRAMADOL HYDROCHLORIDE 50 MG/1
50 TABLET ORAL EVERY 8 HOURS PRN
Qty: 28 TABLET | Refills: 0 | Status: SHIPPED | OUTPATIENT
Start: 2024-12-04 | End: 2024-12-11

## 2024-12-04 RX ORDER — DEXTROSE MONOHYDRATE 100 MG/ML
50 INJECTION, SOLUTION INTRAVENOUS CONTINUOUS
Status: DISCONTINUED | OUTPATIENT
Start: 2024-12-04 | End: 2024-12-04

## 2024-12-04 RX ORDER — DIPHENHYDRAMINE HCL 25 MG
25 CAPSULE ORAL ONCE
Status: COMPLETED | OUTPATIENT
Start: 2024-12-04 | End: 2024-12-04

## 2024-12-04 ASSESSMENT — COGNITIVE AND FUNCTIONAL STATUS - GENERAL
MOVING FROM LYING ON BACK TO SITTING ON SIDE OF FLAT BED WITH BEDRAILS: A LOT
MOVING FROM LYING ON BACK TO SITTING ON SIDE OF FLAT BED WITH BEDRAILS: A LOT
MOBILITY SCORE: 16
TURNING FROM BACK TO SIDE WHILE IN FLAT BAD: A LOT
MOBILITY SCORE: 11
TOILETING: A LITTLE
CLIMB 3 TO 5 STEPS WITH RAILING: A LOT
HELP NEEDED FOR BATHING: A LOT
STANDING UP FROM CHAIR USING ARMS: A LOT
PERSONAL GROOMING: A LITTLE
CLIMB 3 TO 5 STEPS WITH RAILING: TOTAL
TOILETING: A LOT
DRESSING REGULAR LOWER BODY CLOTHING: A LOT
STANDING UP FROM CHAIR USING ARMS: A LITTLE
HELP NEEDED FOR BATHING: A LITTLE
EATING MEALS: A LITTLE
DRESSING REGULAR UPPER BODY CLOTHING: A LITTLE
DAILY ACTIVITIY SCORE: 18
PERSONAL GROOMING: A LITTLE
STANDING UP FROM CHAIR USING ARMS: A LOT
TURNING FROM BACK TO SIDE WHILE IN FLAT BAD: A LOT
MOVING TO AND FROM BED TO CHAIR: A LOT
DAILY ACTIVITIY SCORE: 15
WALKING IN HOSPITAL ROOM: A LOT
MOVING TO AND FROM BED TO CHAIR: A LOT
MOVING FROM LYING ON BACK TO SITTING ON SIDE OF FLAT BED WITH BEDRAILS: A LOT
DRESSING REGULAR LOWER BODY CLOTHING: A LOT
DRESSING REGULAR UPPER BODY CLOTHING: A LITTLE
MOVING TO AND FROM BED TO CHAIR: A LITTLE
TURNING FROM BACK TO SIDE WHILE IN FLAT BAD: A LOT

## 2024-12-04 ASSESSMENT — PAIN SCALES - GENERAL
PAINLEVEL_OUTOF10: 8
PAINLEVEL_OUTOF10: 5 - MODERATE PAIN
PAINLEVEL_OUTOF10: 8
PAINLEVEL_OUTOF10: 5 - MODERATE PAIN

## 2024-12-04 ASSESSMENT — ACTIVITIES OF DAILY LIVING (ADL)
ADL_ASSISTANCE: INDEPENDENT
LACK_OF_TRANSPORTATION: NO

## 2024-12-04 ASSESSMENT — PAIN - FUNCTIONAL ASSESSMENT
PAIN_FUNCTIONAL_ASSESSMENT: 0-10

## 2024-12-04 NOTE — SIGNIFICANT EVENT
Paged for RADAR alert for BP 90/53 without tachycardia. Patient seen at bedside. No new oxygen requirement or pains, states she is doing well with her pain under control. Denies nausea, vomiting, shortness of breath, vision changes, headaches, chest pain, new abdominal pain, numbness and tingling in the extremities. UOP since OR is 70mL, 0.2 mL/kg/hr. Advised nursing staff to hold patient's metoprolol and ordered 500mL fluid bolus. Will re-check patient's vitals in 1 hour.

## 2024-12-04 NOTE — PROGRESS NOTES
Pharmacy Medication History Review    Elsa Castellano is a 73 y.o. 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 Pharmacy reviewed the patient's [id number]-ezszgeedc medications and allergies for accuracy.    Medications ADDED:  N/A  Medications CHANGED:  Umeclidinium 62.5 mcg/actuation inhalation: inhale 1 puff once daily  Medications REMOVED:   N/A     The list below reflects the updated PTA list.   Prior to Admission Medications   Prescriptions Last Dose Informant   albuterol (Proventil HFA) 90 mcg/actuation inhaler Unknown Self   Sig: Inhale 2 puffs every 6 hours if needed for wheezing.   amLODIPine (Norvasc) 10 mg tablet 12/3/2024 Morning Self   Sig: Take 1 tablet (10 mg) by mouth once daily.   aspirin (Adult Low Dose Aspirin) 81 mg EC tablet 12/3/2024 Morning Self   Sig: Take 1 tablet (81 mg) by mouth once daily.   atorvastatin (Lipitor) 40 mg tablet 12/3/2024 Morning Self   Sig: Take 1 tablet (40 mg) by mouth once daily.   metoprolol succinate XL (Toprol-XL) 50 mg 24 hr tablet 12/3/2024 Morning Self   Sig: Take 1 tablet (50 mg) by mouth once daily.   omeprazole (PriLOSEC) 20 mg DR capsule 12/3/2024 Morning Self   Sig: Take 1 capsule (20 mg) by mouth once daily. 30 days supply   30 day course- last dose     tirzepatide (Mounjaro) 5 mg/0.5 mL pen injector 2024 Self   Si mg by abdominal subcutaneous route 1 (one) time per week.   Patient taking differently: 5 mg by abdominal subcutaneous route 1 (one) time per week. On Saturday   umeclidinium (Incruse Ellipta) 62.5 mcg/actuation inhalation 12/3/2024 Morning Self   Sig: Inhale 1 puff (62.5 mcg) once daily.      Facility-Administered Medications: None       The list below reflects the updated allergy list. Please review each documented allergy for additional clarification and justification.  Allergies  Reviewed by Rosalina Anaya RN on 12/3/2024        Severity Reactions Comments    Penicillins High Unknown,  Anaphylaxis, Swelling     Hydrochlorothiazide Not Specified Dizziness             Patient accepts M2B at discharge. Pharmacy has been updated to [facility].    Sources  [facility]  Pharmacy dispense history  Patient interview Good historian   Detailed medication list at bedside for review    Additional Comments  Omeprazole prescribed for a 30 day course- last day December 6th     Obed Godinez PharmD  Transitions of Care Pharmacist  [facility]  Please reach out via Secure Chat for questions, or if no response call [employer] or [facility]

## 2024-12-04 NOTE — OP NOTE
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) [C24.0]     Procedures  Robot-Converted to open Left Lateral Liver Resection    Regional Lymphadenectomy    Surgeons      * Gilbert Burton - Primary    Resident/Fellow/Other Assistant:  Surgeons and Role:     * Francisco Gibbs MD - Resident - Assisting    Staff:   Circulator: Starr Quigley Person: Caryn  Circulator: Kristie  Relief Circulator: Jacque Diez Scrub: Starr    Anesthesia Staff: Anesthesiologist: Oumar Atwood MD; Aretha Benson MD  CRNA: CHRIS Li-CRNA  Anesthesia Resident: Brady Shaikh MD    Procedure Summary  Anesthesia: General  ASA: III  Estimated Blood Loss: 500mL  Intra-op Medications:   Administrations occurring from 1000 to 1510 on 24:   Medication Name Total Dose   heparin (porcine) injection 5,000 Units 5,000 Units   levoFLOXacin (Levaquin) 500 mg in dextrose 5%  mL 500 mg   acetaminophen (Ofirmev) injection 1,000 mg   albumin human bottle 5% 500 mL   calcium chloride 10% 0.5 g   dexAMETHasone (Decadron) injection 4 mg/mL 4 mg   fentaNYL (Sublimaze) injection 50 mcg/mL 100 mcg   indocyanine green (IC-Green) injection 25 mg 2.5 mg   lactated Ringer's infusion Cannot be calculated   lidocaine (Xylocaine) injection 2 % 100 mg   metoprolol tartrate (Lopressor) injection 5 mg   midazolam PF (Versed) injection 1 mg/mL 1 mg   propofol (Diprivan) injection 10 mg/mL 200 mg   rocuronium (ZeMuron) 50 mg/5 mL injection 120 mg              Anesthesia Record               Intraprocedure I/O Totals          Intake    lactated Ringer's 1000.00 mL    acetaminophen  ([facility]) injection 100.00 mL    albumin human bottle 5% 500.00 mL    levoFLOXacin (Levaquin) 500 mg in dextrose 5%  mL 100.00 mL    Total Intake 1700 mL       Output    Urine 320 mL    Est. Blood Loss 500 mL    Total Output 820 mL       Net    Net Volume 880 mL          Specimen:   ID Type Source Tests Collected by Time   1 : Preperitoneal fat Tissue PERITONEUM BIOPSY SURGICAL PATHOLOGY EXAM Gilbert Burton MD 12/3/2024 1413   2 : Segments 2 and 3 Tissue LIVER PARTIAL HEPATECTOMY SURGICAL PATHOLOGY EXAM Gilbert Burton MD 12/3/2024 1414   3 : Hepatoportal lymph node Tissue LYMPH NODE BIOPSY SURGICAL PATHOLOGY EXAM Gilbert Burton MD 12/3/2024 1431                 Drains and/or Catheters:   Closed/Suction Drain Left LLQ (Active)   Dressing Status Dry;Clean 12/03/24 2000   Drainage Appearance Dark red 12/03/24 2000   Status Open to gravity drainage 12/03/24 2000   Output (mL) 50 mL 12/04/24 0830       Urethral Catheter 16 Fr. (Active)   Site Assessment Clean;Skin intact 12/04/24 0346   Collection Container Standard drainage bag 12/03/24 2000   Securement Method Securing device (Describe) 12/03/24 2000   Reason for Continuing Urinary Catheterization surgical procedures: urological/gynecological, pelvic oncology, anal, prolonged surgical procedure 12/03/24 2200   Output (mL) 50 mL 12/04/24 0830       Findings: Tumor superiorly in the left lateral section of liver.  Lymphadenopathy    Indications: Elsa Castellano is an 73 y.o. 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     The patient was seen in the preoperative area. The risks, benefits, complications, treatment options, non-operative alternatives, expected recovery and outcomes were discussed with the patient. The possibilities of reaction to medication, pulmonary aspiration, injury to surrounding structures, bleeding, recurrent infection, the need for additional procedures, failure to diagnose a condition, and  creating a complication requiring transfusion or operation were discussed with the patient. The patient concurred with the proposed plan, giving informed consent.  The site of surgery was properly noted/marked if necessary per policy. The patient has been actively warmed in preoperative area. Preoperative antibiotics have been ordered and given within 1 hours of incision. Venous thrombosis prophylaxis have been ordered including bilateral sequential compression devices and chemical prophylaxis    Procedure Details:   The patient was brought to the operating room and placed supine on the table. General anesthesia was smoothly induced. The abdomen was prepped and draped in the usual fashion. Timeout was performed.    Incision was made in the right lower quadrant of the abdomen and a mini GelPort was placed.  Air seal was placed through the GelPort.  The abdomen was insufflated and explored. There was no evidence of carcinomatosis.  4 robotic ports were placed.  Left triangular and coronary ligament was taken down in order to mobilize the left lateral section.  The tumor was notable at the superior lateral tip of the liver.  Ultrasound of liver was performed to identify the tumor and I marked the liver capsule for the transection site. The resection was now started using the monopolar scissors and Synchro seal device.  The exposure and the angles were somewhat difficult and some bleeding occurred during the resection.  Pressure was held using a sponge.  As I try to identify the area of bleeding to control it there was some arterial bleeding which was seen.  Given the angle of the camera and the instruments I cannot clearly see this area to be able to control it.  Therefore I changed the site of the camera from port #2 2 port #3.  Despite this, the visualization was not ideal and therefore the decision was made to convert to open.  One of the robotic arms was left in place to hold pressure with a sponge at the bleeding  site and the other 3 robotic arms were undocked and a midline laparotomy was made.  Once the hand was able to be placed to manually control the liver, the robotic arm was removed.  The site of the arterial bleeding was now controlled using the argon beam coagulator.  The remaining parenchymal transection was completed using the LigaSure device.  At the superior aspect where the branch of the left hepatic vein was, a transhepatic horizontal mattress suture of chromic was used for additional hemostasis.  The cut edge of the liver was now examined and additional hemostasis was performed with the argon beam coagulator.  There was no evidence of bile leak.  I examined the specimen on the back table using the ultrasound as well as cutting into the specimen to ensure that there was an adequate gross margin at the resection site.  I directed my attention to the lymphadenectomy.  There were some enlarged lymph nodes in the regional lymph nodes, specifically the hepatic artery lymph node and the lateral portal lymph node were somewhat enlarged.  A complete hepatoportal lymphadenectomy was performed circumferentially around the portal structures as well as removing the hepatic artery lymph node.  The abdomen was now copiously irrigated.  Tisseel was applied at the surgical sites.  15 Slovak drain was placed through a separate stab incision in the left upper quadrant and placed adjacent to the liver resection site.  Abdominal fascia at the right lower quadrant site was closed using interrupted figure-of-eight #1 PDS sutures.  Midline fascia was closed using running #1 PDS suture from either direction in each layer. Skin was closed with absorbable sutures.  Surgical glue was applied at the incision sites and a drain sponge was placed.      The patient tolerated the procedure well without any apparent intraoperative complications. All sponge, needle, and instrument counts were correct.        Complications:  None; patient  tolerated the procedure well.    Disposition: PACU - hemodynamically stable.  Condition: stable         Attending Attestation: I was present and scrubbed for the key portions of the procedure.    Gilbert Burton  Phone Number: [phone]

## 2024-12-04 NOTE — SIGNIFICANT EVENT
S:    POD 0 from robostic-assisted (converted to open) partial hepatectomy and portal lymphadenectomy. Patient complaining of lower back pain.     O:   Vital signs are stable, normotensive, afebrile, no new or worsening oxygen requirement, not tachycardic  Visit Vitals  /62 (BP Location: Right arm, Patient Position: Lying)   Pulse 86   Temp 36.8 °C (98.2 °F) (Temporal)   Resp 15        Constitutional: no acute distress  Skin: warm and dry overall  Neuro: A/O x4, no gross deficits   HEENT: Atraumatic, no scleral icterus  Cardiac: RRR  Pulmonary: Unlabored respirations on nasal canula  Abdomen: Non distended, non tender  GI: Voiding via soriano catheter  Surgical Site: Dressing clean dry and intact with minimal amounts of strikethrough, appropriately tender. Drain serosanguinous    A/P:  Overall, patient is doing well postoperatively with no acute concerns.  Will continue to optimize pain control as needed.  Will continue to monitor clinical exam, vitals, I&O's, and labs when available.  Will follow up on the patient in the a.m. or sooner as needed.

## 2024-12-04 NOTE — HOSPITAL COURSE
Elsa Castellano is a 73 yr old female with hepatic adenocarcinoma determined by biopsy of bile duct who underwent Robotic-assisted converted to open partial hepatectomy and portal lymphadenectomy on 12/3 with Dr Burton. She tolerated the procedure well. Her postoperative course was significant for oligouric NIDHI requiring fluid resuscitation, hyperkalemia requiring insulin, and left upper extremity swelling requiring Lasix diuresis. Jensen was removed POD2 and passed TOV. Surgical drain was removed POD 5. Diet advanced slowly as tolerated. Pain was initially controlled on IV pain medication and transitioned to PO. Exercise tolerance progressed slowly as tolerated. She will be transferred to [facility] on POD7.     At the time of discharge on 12/10/24, pain was controlled. She was voiding spontaneously, ambulating appropriating, and tolerating a full diet. She was deemed appropriate for discharge and has an outpatient follow up with Dr. Burton on 12/18.

## 2024-12-04 NOTE — PROGRESS NOTES
12/04/24 1411   Discharge Planning   Living Arrangements Spouse/significant other   Support Systems Spouse/significant other   Assistance Needed none   Type of Residence Private residence   Number of Stairs to Enter Residence 12   Do you have animals or pets at home? No   Who is requesting discharge planning? Provider   Home or Post Acute Services Post acute facilities (Rehab/SNF/etc)   Type of Post Acute Facility Services Skilled nursing   Expected Discharge Disposition SNF   Does the patient need discharge transport arranged? Yes   RoundTrip coordination needed? Yes   Has discharge transport been arranged? No     74 yo F with COPD, anxiety, prior MI, HTN, and pre-DM with liver adenocarcinoma now s/p robotic converted to open partial hepatectomy (segments 2 and 3). Post-op course notable for low UOP and NIDHI.     TCC Note: Met with patient and introduced myself as care coordinator and member of the care transitions team for discharge planning. Patient lives with her , home has 2 stories. Patient was able to walk steps prior to admission. Patient is independent, does not use assistive devices. Denies falls. Demograhics and contacts verified. Patient is open to SNF or HC if needed. HC is AOC. PT recommended SNF, SNF list given to patient/spouse will to choose FOC. Care transitions to follow up with FOC. Pascale Moreno RN                                               DME: None  O2: None    Home Care: [facility]  PCP: Gilbert Velazco MD  Pharm: [facility], [address], [place]  Barriers: None    Discharge Disposition: SNF vs HC, pending FOC  ADOD: 12/6  Transport at Discharge: Roundtrip or  if home

## 2024-12-04 NOTE — NURSING NOTE
Rapid Response Nurse Note: RADAR alert: 7    Pager time: [phone]  Arrival time: 1220  Event end time: 1240  Location: [facility] 6  [] Triage by phone or secure messaging    Rapid response initiated by:  [] Rapid response RN [] Family [] Nursing Supervisor [] Physician   [x] RADAR auto page [] Sepsis auto-page [] RN [] RT   [] NP/PA [] Other:     Primary reason for call:   [] BAT [] New CPAP/BiPAP [] Bleeding [] Change in mental status   [] Chest pain [] Code blue [] FiO2 >/= 50% [] HR </= 40 bpm   [] HR >/= 130 bpm [] Hyperglycemia [] Hypoglycemia [x] RADAR    [] RR </= 8 bpm [] RR >/= 30 bpm [] SBP </= 90 mmHg [] SpO2 < 90%   [] Seizure [] Sepsis [] Shortness of breath  [] Staff concern: see comments     Initial VS and/or RADAR VS: T 35.8 °C; HR 94; RR 16; /71; SPO2 92%.        Interventions:  [x] None [] ABG/VBG [] Assist w/ICU transfer [] BAT paged    [] Bag mask [] Blood [] Cardioversion [] Code Blue   [] Code blue for intubation [] Code status changed [] Chest x-ray [] EKG   [] IV fluid/bolus [] KUB x-ray [] Labs/cultures [] Medication   [] Nebulizer treatment [] NIPPV (CPAP/BiPAP) [] Oxygen [] Oral airway   [] Peripheral IV [] Palliative care consult [] CT/MRI [] Sepsis protocol    [] Suctioned [] Other:     Outcome:  [] Coded and  [] Code blue for intubation [] Coded and transferred to ICU []  on division   [x] Remained on division (no change) [] Remained on division + additional monitoring [] Remained in ED [] Transferred to ED   [] Transferred to ICU [] Transferred to inpatient status [] Transferred for interventions (procedure) [] Transferred to ICU stepdown    [] Transferred to surgery [] Transferred to telemetry [] Sepsis protocol [] STEMI protocol   [] Stroke protocol [x] Bedside nurse instructed to page rapid response for any concerns or acute change in condition/VS     Additional Comments: Radar autopage of 7 for above VS. Pt seen, she is alert, resting quietly in bed. Spoke with her  bedside nurse, she voices no rapid response issues at this time

## 2024-12-04 NOTE — SIGNIFICANT EVENT
Rapid Response Nurse Note:  [x] RADAR alert/Score 6    Pager time:   Arrival time:   Event end time: 5  Location: [facility] 600  [] Phone triage     Rapid response initiated by:  [] Rapid Response RN [] Family [] Nursing Supervisor [] Physician   [x] RADAR auto-page [] Sepsis auto-page [] RN [] RT   [] NP/PA [] Other:     Primary reason for call:   [] BAT [] New CPAP/BiPAP [] Bleeding [] Change in mental status   [] Chest pain [] Code blue [] FiO2 >/= 50% [] HR </= 40 bpm   [] HR >/= 130 bpm [] Hyperglycemia [] Hypoglycemia [x] RADAR    [] RR </= 8 bpm [] RR >/= 30 bpm [] SBP </= 90 mmHg [] SpO2 < 90%   [] Seizure [] Sepsis [] Staff concern:     Initial VS and/or RADAR VS:  radar vitals below in bold    Vitals:    24 0000   BP: 106/62 94/64 89/60 90/63   BP Location: Right arm Right arm     Patient Position: Lying Lying     Pulse: 86 86 82    Resp: 15 16 16    Temp:  36.4 °C (97.5 °F) 35.8 °C (96.4 °F)    TempSrc:  Axillary     SpO2: 98% 98% 98%    Weight:       Height:            Interventions:  [] None [] ABG [] Assist w/ICU transfer [] BAT paged    [] Bag mask [] Blood [] Cardioversion [] Code Blue   [] Code blue for intubation [] Code status changed [] Chest x-ray [] EKG   [] IV fluid/bolus [] KUB x-ray [] Labs/cultures [] Medication   [] Nebulizer treatment [] NIPPV (CPAP/BiPAP) [] Oxygen [] Oral airway   [] Peripheral IV [] Palliative care consult [] CT/MRI [] Sepsis protocol    [] Suctioned [x] Other:  IS       Outcome:  [] Coded and  [] Code blue for intubation [] Coded and transferred to ICU []  on division   [x] Remained on division (no change) [] Remained on division + additional monitoring [] Remained in ED [] Transferred to ED   [] Transferred to ICU [] Transferred to inpatient status [] Transferred for interventions (procedure) [] Transferred to ICU stepdown    [] Transferred to surgery [] Transferred to telemetry [] Sepsis protocol []  STEMI protocol   [] Stroke protocol [x] Bedside nurse instructed to page rapid response for any concerns or acute change in condition/VS     Additional Comments: Spoke to RN regarding vital signs.  Dr Shilo Mcneal notified regarding bp.  Pt was asymptomatic at this time.  IVF running at 100 ml per hour.  On PCA Dilaudid for pain control.  IS given with instructions and encouragement.

## 2024-12-04 NOTE — PROGRESS NOTES
Physical Therapy    Physical Therapy Evaluation & Treatment    Patient Name: Elsa Castellano  MRN: [id number]  Department: [facility]  Room: [facility]-  Today's Date: 12/4/2024   Time Calculation  Start Time: 1049  Stop Time: 1130  Time Calculation (min): 41 min    Assessment/Plan   PT Assessment  PT Assessment Results: Decreased endurance, Decreased mobility  Rehab Prognosis: Good  Evaluation/Treatment Tolerance: Patient limited by pain (Symptoms of nausea; decreased endurance)  Medical Staff Made Aware: Yes  Strengths: Ability to acquire knowledge  Barriers to Participation:  (None)  End of Session Communication: Bedside nurse  Assessment Comment: 73 y.o. F s/p robostic-assisted (converted to open) partial hepatectomy and portal lymphadenectomy. Pt able to perform bed mobility and transfers with mod A. Pt presented with decreased endurance and abdominal pain; limited due to symptoms of nausea. 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 (Multi); pt s/p robostic-assisted (converted to open) partial hepatectomy and portal lymphadenectomy  Past Medical History Relevant to Rehab: Acute non-ST segment elevation myocardial infarction (Multi) 08/28/2022, Anxiety 12/26/2023,  COPD 09/08/2023, history of hypertension 12/30/2023, Hyperlipidemia 09/08/2023  Family/Caregiver Present: Yes ()  Prior to Session Communication:  Bedside nurse  Patient Position Received: Bed, 3 rail up, Alarm off, not on at start of session  Preferred Learning Style: verbal  General Comment: RN cleared pt for PT; pt supine and receptive to therapy  Home Living:  Home Living  Type of Home: House  Lives With: Spouse  Home Adaptive Equipment: None  Home Layout: Two level, Bed/bath upstairs  Home Access: Stairs to enter without rails  Entrance Stairs-Rails: None  Entrance Stairs-Number of Steps: 2  Bathroom Shower/Tub: Walk-in shower  Prior Level of Function:  Prior Function Per Pt/Caregiver Report  Level of [place]: Independent with ADLs and functional transfers  ADL Assistance: Independent  Homemaking Assistance: Independent  Ambulatory Assistance: Independent  Vocational: Retired  Prior Function Comments: +drives; reported no falls in past six months  Precautions:  Precautions  Medical Precautions: Fall precautions  Post-Surgical Precautions: Abdominal surgery precautions    Vital Signs (Past 2hrs)                Objective   Pain:  Pain Assessment  Pain Assessment: 0-10  0-10 (Numeric) Pain Score: 8  Pain Interventions: Repositioned (PCA)  Cognition:  Cognition  Overall Cognitive Status: Within Functional Limits  Orientation Level: Oriented X4    General Assessments:  Activity Tolerance  Endurance: Tolerates 10 - 20 min exercise with multiple rests  Early Mobility/Exercise Safety Screen: Proceed with mobilization - No exclusion criteria met  Activity Tolerance Comments: Pt limited due to pain and symptoms of nausea    Sensation  Sensation Comment: Denied any N/T    Strength  Strength Comments: WFL  Static Sitting Balance  Static Sitting-Balance Support: Feet supported  Static Sitting-Level of Assistance: Contact guard  Static Sitting-Comment/Number of Minutes: 20 min; pt able to tolerate sitting EOB for extended time. Pt needed extended seated rest periods and sat for assessment of SPO2.    Static Standing Balance  Static Standing-Balance Support:  Bilateral upper extremity supported  Static Standing-Level of Assistance: Contact guard  Static Standing-Comment/Number of Minutes: 5 min  Functional Assessments:  Bed Mobility  Bed Mobility: Yes  Bed Mobility 1  Bed Mobility 1: Supine to sitting  Level of Assistance 1: Moderate assistance  Bed Mobility Comments 1: Pt cues for log roll sequencing; assist at shoulder for trunk control  Bed Mobility 2  Bed Mobility  2: Sitting to supine  Level of Assistance 2: Moderate assistance, +2  Bed Mobility Comments 2: Pt cues for sequencing back to supine; assist at BLE and shoulders for trunk control    Transfers  Transfer: Yes  Transfer 1  Transfer From 1: Sit to, Stand to  Transfer to 1: Stand, Sit  Technique 1: Sit to stand, Stand to sit  Transfer Device 1: Walker  Transfer Level of Assistance 1: Moderate assistance, +2  Trials/Comments 1: Pt stood from EOB x1 and sat to EOB  Transfers 3  Transfer From 3: Bed to  Transfer to 3:  (HOB)  Technique 3: Lateral    Ambulation/Gait Training  Ambulation/Gait Training Performed: Yes  Ambulation/Gait Training 1  Surface 1: Level tile  Device 1: Rolling walker  Assistance 1: Contact guard  Quality of Gait 1: Decreased step length, Shuffling gait  Comments/Distance (ft) 1: 3 lateral steps to R  Extremity/Trunk Assessments:  RUE   RUE : Within Functional Limits  LUE   LUE: Within Functional Limits  RLE   RLE : Within Functional Limits  LLE   LLE : Within Functional Limits  Treatments:  Therapeutic Activity  Therapeutic Activity Performed: Yes  Therapeutic Activity 1: Pt performed bed mobility with mod A; cues for log roll sequencing. Pt sat EOB for extended rest periods and for assessment of O2. Pt  performed transfer with mod A x2 and ambulated utilizing lateral steps with CGA. Once returned to EOB pt took lateral scoots closer to HOB.  Outcome Measures:  [facility] Basic Mobility  Turning from your back to your side while in a flat bed without using bedrails: A lot  Moving from lying on  your back to sitting on the side of a flat bed without using bedrails: A lot  Moving to and from bed to chair (including a wheelchair): A lot  Standing up from a chair using your arms (e.g. wheelchair or bedside chair): A lot  To walk in hospital room: A lot  Climbing 3-5 steps with railing: Total  Basic Mobility - Total Score: 11    Encounter Problems       Encounter Problems (Active)       Mobility       LTG - Patient will navigate 2 steps with LRAD with CGA        Start:  12/04/24    Expected End:  12/25/24            STG - Patient will ambulate >150ft with LRAD utilizing SBA       Start:  12/04/24    Expected End:  12/25/24               PT Transfers       STG - Patient will perform bed mobility with SBA       Start:  12/04/24    Expected End:  12/25/24            STG - Patient will transfer sit to and from stand with LRAD with SBA        Start:  12/04/24    Expected End:  12/25/24               Pain - Adult              Education Documentation  Precautions, taught by JEFERSON Courtney at 12/4/2024  2:47 PM.  Learner: Patient  Readiness: Acceptance  Method: Explanation  Response: Verbalizes Understanding  Comment: Edu on role of PT, pursed lip breathing, and log roll sequencing    Body Mechanics, taught by JEFERSON Courtney at 12/4/2024  2:47 PM.  Learner: Patient  Readiness: Acceptance  Method: Explanation  Response: Verbalizes Understanding  Comment: Edu on role of PT, pursed lip breathing, and log roll sequencing    Mobility Training, taught by JEFERSON Courtney at 12/4/2024  2:47 PM.  Learner: Patient  Readiness: Acceptance  Method: Explanation  Response: Verbalizes Understanding  Comment: Edu on role of PT, pursed lip breathing, and log roll sequencing    Education Comments  No comments found.

## 2024-12-04 NOTE — CARE PLAN
Problem: Pain - Adult  Goal: Verbalizes/displays adequate comfort level or baseline comfort level  Outcome: Progressing     Problem: Safety - Adult  Goal: Free from fall injury  Outcome: Progressing     Problem: Discharge Planning  Goal: Discharge to home or other facility with appropriate resources  Outcome: Progressing     Problem: Chronic Conditions and Co-morbidities  Goal: Patient's chronic conditions and co-morbidity symptoms are monitored and maintained or improved  Outcome: Progressing   The patient's goals for the shift include      The clinical goals for the shift include Patient to reman safe and injury fee thoughout thee shift

## 2024-12-04 NOTE — CARE PLAN
The patient's goals for the shift include      The clinical goals for the shift include patient remaining safe and HDS.

## 2024-12-04 NOTE — SIGNIFICANT EVENT
At bedside after patient's metabolic panel returned with a potassium elevated to 6.0 and a creatinine elevated to 1.86. Patient is asymptomatic with no concerns, identical exam to prior. HR 84, /75. Denies nausea, vomiting, shortness of breath, vision changes, headaches, chest pain, abdominal pain, numbness and tingling in the extremities.     EKG obtained, no changes appreciated. No evidence of EKG changes secondary to hyperkalemia. Patient continues to have poor urine output (~.1cc/kg/hr).     - Given suspicion for NIDHI, will continue to volume resuscitate and track UOP.  - Will administer insluin/dextrose for hyperkalemia.   - Will swap potassium-containing fluids (lactated Ringers) to NS.  - Will follow-up repeat CMP later this AM.    Plans discussed with senior resident, Dr. Jermaine Marvin MD

## 2024-12-04 NOTE — PROGRESS NOTES
[facility]  DEPARTMENT OF SURGERY    PROGRESS NOTE  SURGICAL ONCOLOGY    SUBJECTIVE  Low UOP overnight and rise in Cr. Anxious overnight due to sore throat. Pain decently controlled.    OBJECTIVE  Vitals:  Temp:  [35.7 °C (96.3 °F)-36.4 °C (97.5 °F)] 35.8 °C (96.4 °F)  Heart Rate:  [78-95] 94  Resp:  [11-20] 16  BP: ()/(60-72) 109/71  Arterial Line BP 1: ()/(49-58) 94/52    I/Os:  I/O last 3 completed shifts:  In: 2858.8 (31.7 mL/kg) [I.V.:1658.8 (18.4 mL/kg); IV Piggyback:1200]  Out: 945 (10.5 mL/kg) [Urine:445 (0.1 mL/kg/hr); Blood:500]  Weight: 90.3 kg     Exam:  Gen:  NAD, non-toxic appearing  Eyes:  No scleral icterus  Card:  RRR  Resp:  Non-labored breathing on RA  Abd:  Soft, moderately tender along incision, non-distended    L abdominal drain with serosanguinous output  Ext:  WWP, no swelling of BLE  MSK:  HINSON  Psych:  Appropriate mood and affect    Labs:  CBC:   Lab Results   Component Value Date    WBC 21.2 (H) 12/04/2024    RBC 4.56 12/04/2024     BMP:   Lab Results   Component Value Date    GLUCOSE 240 (H) 12/04/2024    CO2 19 (L) 12/04/2024    BUN 33 (H) 12/04/2024    CREATININE 2.07 (H) 12/04/2024    CALCIUM 8.7 12/04/2024     Coagulation:   Lab Results   Component Value Date    INR 1.0 12/04/2024    APTT 21 (L) 12/04/2024       Imaging:  None new      ASSESSMENT:  74 yo F with COPD, anxiety, prior MI, HTN, and pre-DM with liver adenocarcinoma now s/p robotic converted to open partial hepatectomy (segments 2 and 3). Post-op course notable for low UOP and NIDHI.    PLAN:  - PCA  - incentive spirometer  - IV metoprolol in place of home PO regimen; holding home amlodipine, aspirin, lipitor for now  - maintain telemetry  - advance to CLD  - 1L bolus now, maintain   - repeat PM RFP, trend Cr and K  - daily LFTs and coag panels  - keep soriano catheter  - hypoglycemia protocol  - PT, OOB  - SCDs, SQH    d/w Dr. Josephine Gibbs MD  General Surgery  PGY5  Surgical Oncology - [facility] [id number]

## 2024-12-05 LAB
ALBUMIN SERPL BCP-MCNC: 3.1 G/DL (ref 3.4–5)
ALP SERPL-CCNC: 43 U/L (ref 33–136)
ALT SERPL W P-5'-P-CCNC: 223 U/L (ref 7–45)
ANION GAP SERPL CALC-SCNC: 14 MMOL/L (ref 10–20)
APTT PPP: 24 SECONDS (ref 27–38)
AST SERPL W P-5'-P-CCNC: 157 U/L (ref 9–39)
ATRIAL RATE: 93 BPM
BILIRUB SERPL-MCNC: 0.4 MG/DL (ref 0–1.2)
BUN SERPL-MCNC: 47 MG/DL (ref 6–23)
CALCIUM SERPL-MCNC: 8 MG/DL (ref 8.6–10.6)
CHLORIDE SERPL-SCNC: 106 MMOL/L (ref 98–107)
CO2 SERPL-SCNC: 20 MMOL/L (ref 21–32)
CREAT SERPL-MCNC: 2.24 MG/DL (ref 0.5–1.05)
EGFRCR SERPLBLD CKD-EPI 2021: 23 ML/MIN/1.73M*2
ERYTHROCYTE [DISTWIDTH] IN BLOOD BY AUTOMATED COUNT: 14.2 % (ref 11.5–14.5)
GLUCOSE BLD MANUAL STRIP-MCNC: 128 MG/DL (ref 74–99)
GLUCOSE BLD MANUAL STRIP-MCNC: 139 MG/DL (ref 74–99)
GLUCOSE SERPL-MCNC: 153 MG/DL (ref 74–99)
HCT VFR BLD AUTO: 32.7 % (ref 36–46)
HGB BLD-MCNC: 10.6 G/DL (ref 12–16)
INR PPP: 1.1 (ref 0.9–1.1)
MAGNESIUM SERPL-MCNC: 1.69 MG/DL (ref 1.6–2.4)
MCH RBC QN AUTO: 31.7 PG (ref 26–34)
MCHC RBC AUTO-ENTMCNC: 32.4 G/DL (ref 32–36)
MCV RBC AUTO: 98 FL (ref 80–100)
NRBC BLD-RTO: 0 /100 WBCS (ref 0–0)
P OFFSET: 189 MS
P ONSET: 141 MS
PLATELET # BLD AUTO: 142 X10*3/UL (ref 150–450)
POTASSIUM SERPL-SCNC: 4.9 MMOL/L (ref 3.5–5.3)
PR INTERVAL: 140 MS
PROT SERPL-MCNC: 5.4 G/DL (ref 6.4–8.2)
PROTHROMBIN TIME: 12.4 SECONDS (ref 9.8–12.8)
Q ONSET: 211 MS
QRS COUNT: 15 BEATS
QRS DURATION: 76 MS
QT INTERVAL: 362 MS
QTC CALCULATION(BAZETT): 450 MS
QTC FREDERICIA: 419 MS
R AXIS: 154 DEGREES
RBC # BLD AUTO: 3.34 X10*6/UL (ref 4–5.2)
SODIUM SERPL-SCNC: 135 MMOL/L (ref 136–145)
T AXIS: 133 DEGREES
T OFFSET: 392 MS
VENTRICULAR RATE: 93 BPM
WBC # BLD AUTO: 12.6 X10*3/UL (ref 4.4–11.3)

## 2024-12-05 PROCEDURE — 2500000004 HC RX 250 GENERAL PHARMACY W/ HCPCS (ALT 636 FOR OP/ED): Performed by: STUDENT IN AN ORGANIZED HEALTH CARE EDUCATION/TRAINING PROGRAM

## 2024-12-05 PROCEDURE — 85027 COMPLETE CBC AUTOMATED: CPT | Performed by: NURSE PRACTITIONER

## 2024-12-05 PROCEDURE — 84075 ASSAY ALKALINE PHOSPHATASE: CPT | Performed by: NURSE PRACTITIONER

## 2024-12-05 PROCEDURE — 2500000001 HC RX 250 WO HCPCS SELF ADMINISTERED DRUGS (ALT 637 FOR MEDICARE OP)

## 2024-12-05 PROCEDURE — 1200000003 HC ONCOLOGY  ROOM WITH TELEMETRY DAILY

## 2024-12-05 PROCEDURE — 94640 AIRWAY INHALATION TREATMENT: CPT

## 2024-12-05 PROCEDURE — 2500000004 HC RX 250 GENERAL PHARMACY W/ HCPCS (ALT 636 FOR OP/ED): Performed by: NURSE PRACTITIONER

## 2024-12-05 PROCEDURE — 2500000004 HC RX 250 GENERAL PHARMACY W/ HCPCS (ALT 636 FOR OP/ED)

## 2024-12-05 PROCEDURE — 82947 ASSAY GLUCOSE BLOOD QUANT: CPT

## 2024-12-05 PROCEDURE — 2500000005 HC RX 250 GENERAL PHARMACY W/O HCPCS: Performed by: STUDENT IN AN ORGANIZED HEALTH CARE EDUCATION/TRAINING PROGRAM

## 2024-12-05 PROCEDURE — 85610 PROTHROMBIN TIME: CPT | Performed by: STUDENT IN AN ORGANIZED HEALTH CARE EDUCATION/TRAINING PROGRAM

## 2024-12-05 PROCEDURE — 83735 ASSAY OF MAGNESIUM: CPT | Performed by: NURSE PRACTITIONER

## 2024-12-05 PROCEDURE — 36415 COLL VENOUS BLD VENIPUNCTURE: CPT | Performed by: STUDENT IN AN ORGANIZED HEALTH CARE EDUCATION/TRAINING PROGRAM

## 2024-12-05 RX ORDER — DIPHENHYDRAMINE HCL 25 MG
25 CAPSULE ORAL EVERY 6 HOURS PRN
Status: DISPENSED | OUTPATIENT
Start: 2024-12-05

## 2024-12-05 RX ORDER — SODIUM CHLORIDE 9 MG/ML
125 INJECTION, SOLUTION INTRAVENOUS CONTINUOUS
Status: ACTIVE | OUTPATIENT
Start: 2024-12-05 | End: 2024-12-06

## 2024-12-05 ASSESSMENT — COGNITIVE AND FUNCTIONAL STATUS - GENERAL
WALKING IN HOSPITAL ROOM: A LOT
MOVING TO AND FROM BED TO CHAIR: A LITTLE
CLIMB 3 TO 5 STEPS WITH RAILING: A LOT
MOVING FROM LYING ON BACK TO SITTING ON SIDE OF FLAT BED WITH BEDRAILS: A LITTLE
TOILETING: A LOT
DRESSING REGULAR LOWER BODY CLOTHING: A LOT
TURNING FROM BACK TO SIDE WHILE IN FLAT BAD: A LITTLE
STANDING UP FROM CHAIR USING ARMS: A LITTLE
DRESSING REGULAR LOWER BODY CLOTHING: A LOT
DAILY ACTIVITIY SCORE: 15
DAILY ACTIVITIY SCORE: 17
TURNING FROM BACK TO SIDE WHILE IN FLAT BAD: A LITTLE
MOVING FROM LYING ON BACK TO SITTING ON SIDE OF FLAT BED WITH BEDRAILS: A LITTLE
HELP NEEDED FOR BATHING: A LOT
PERSONAL GROOMING: A LITTLE
TOILETING: A LITTLE
WALKING IN HOSPITAL ROOM: A LOT
MOVING TO AND FROM BED TO CHAIR: A LITTLE
EATING MEALS: A LITTLE
MOBILITY SCORE: 16
HELP NEEDED FOR BATHING: A LOT
DRESSING REGULAR UPPER BODY CLOTHING: A LITTLE
PERSONAL GROOMING: A LITTLE
CLIMB 3 TO 5 STEPS WITH RAILING: A LOT
MOBILITY SCORE: 16
DRESSING REGULAR UPPER BODY CLOTHING: A LITTLE
STANDING UP FROM CHAIR USING ARMS: A LITTLE

## 2024-12-05 ASSESSMENT — PAIN - FUNCTIONAL ASSESSMENT: PAIN_FUNCTIONAL_ASSESSMENT: 0-10

## 2024-12-05 ASSESSMENT — ACTIVITIES OF DAILY LIVING (ADL): LACK_OF_TRANSPORTATION: NO

## 2024-12-05 ASSESSMENT — PAIN SCALES - GENERAL
PAINLEVEL_OUTOF10: 8
PAINLEVEL_OUTOF10: 6

## 2024-12-05 ASSESSMENT — PAIN DESCRIPTION - LOCATION: LOCATION: ABDOMEN

## 2024-12-05 ASSESSMENT — PAIN DESCRIPTION - ORIENTATION: ORIENTATION: MID

## 2024-12-05 NOTE — CARE PLAN
Problem: Pain - Adult  Goal: Verbalizes/displays adequate comfort level or baseline comfort level  Outcome: Progressing     Problem: Safety - Adult  Goal: Free from fall injury  Outcome: Progressing     Problem: Discharge Planning  Goal: Discharge to home or other facility with appropriate resources  Outcome: Progressing     Problem: Chronic Conditions and Co-morbidities  Goal: Patient's chronic conditions and co-morbidity symptoms are monitored and maintained or improved  Outcome: Progressing       The clinical goals for the shift include pt to rate pain <7/10 by end of shift

## 2024-12-05 NOTE — NURSING NOTE
Rapid Response Nurse Note: RADAR alert: SCORE = 7    Pager time:   Arrival time:   Event end time:   Location: [place] [id number]  [x] Triage by phone or secure messaging    Rapid response initiated by:  [] Rapid response RN [] Family [] Nursing Supervisor [] Physician   [x] RADAR auto page [] Sepsis auto-page [] RN [] RT   [] NP/PA [] Other:     Primary reason for call:   [] BAT [] New CPAP/BiPAP [] Bleeding [] Change in mental status   [] Chest pain [] Code blue [] FiO2 >/= 50% [] HR </= 40 bpm   [] HR >/= 130 bpm [] Hyperglycemia [] Hypoglycemia [x] RADAR    [] RR </= 8 bpm [] RR >/= 30 bpm [] SBP </= 90 mmHg [] SpO2 < 90%   [] Seizure [] Sepsis [] Shortness of breath  [] Staff concern: see comments     Initial VS and/or RADAR VS: T 36.5 °C; HR 94; RR 16; /62; SPO2 93%.    Interventions:  [x] None [] ABG/VBG [] Assist w/ICU transfer [] BAT paged    [] Bag mask [] Blood [] Cardioversion [] Code Blue   [] Code blue for intubation [] Code status changed [] Chest x-ray [] EKG   [] IV fluid/bolus [] KUB x-ray [] Labs/cultures [] Medication   [] Nebulizer treatment [] NIPPV (CPAP/BiPAP) [] Oxygen [] Oral airway   [] Peripheral IV [] Palliative care consult [] CT/MRI [] Sepsis protocol    [] Suctioned [] Other:     Outcome:  [] Coded and  [] Code blue for intubation [] Coded and transferred to ICU []  on division   [x] Remained on division (no change) [] Remained on division + additional monitoring [] Remained in ED [] Transferred to ED   [] Transferred to ICU [] Transferred to inpatient status [] Transferred for interventions (procedure) [] Transferred to ICU stepdown    [] Transferred to surgery [] Transferred to telemetry [] Sepsis protocol [] STEMI protocol   [] Stroke protocol [x] Bedside nurse instructed to page rapid response for any concerns or acute change in condition/VS     Additional Comments: RADAR auto page received for above vitals. Per bedside RN, no acute changes or concerns at  this time.  Please page rapid response for any concerns for deterioration or assistance needed.

## 2024-12-05 NOTE — PROGRESS NOTES
[facility]  DEPARTMENT OF SURGERY    PROGRESS NOTE  SURGICAL ONCOLOGY    SUBJECTIVE  Oliguria overnight. Burping occasionally. Less anxious.    L abd drain: 130 ml serosang    OBJECTIVE  Vitals:  Temp:  [35.8 °C (96.4 °F)-36.7 °C (98.1 °F)] 36.6 °C (97.9 °F)  Heart Rate:  [] 99  Resp:  [15-17] 15  BP: (105-121)/(53-75) 121/53    I/Os:  I/O last 3 completed shifts:  In: 5821.3 (64.5 mL/kg) [P.O.:200; I.V.:2971.3 (32.9 mL/kg); IV Piggyback:2650]  Out: 680 (7.5 mL/kg) [Urine:550 (0.2 mL/kg/hr); Drains:130]  Weight: 90.3 kg     Exam:  Gen:  NAD, non-toxic appearing  Eyes:  No scleral icterus  Card:  RRR  Resp:  Non-labored breathing on RA  Abd:  Soft, moderately tender along incision, non-distended    L abdominal drain with serosanguinous output  Ext:  WWP, no swelling of BLE  MSK:  HINSON  Psych:  Appropriate mood and affect    Labs:  CBC:   Lab Results   Component Value Date    WBC 12.6 (H) 12/05/2024    RBC 3.34 (L) 12/05/2024     BMP:   Lab Results   Component Value Date    GLUCOSE 153 (H) 12/05/2024    CO2 20 (L) 12/05/2024    BUN 47 (H) 12/05/2024    CREATININE 2.24 (H) 12/05/2024    CALCIUM 8.0 (L) 12/05/2024     Coagulation:   Lab Results   Component Value Date    INR 1.1 12/05/2024    APTT 24 (L) 12/05/2024       Imaging:  None new      ASSESSMENT:  72 yo F with COPD, anxiety, prior MI, HTN, and pre-DM with liver adenocarcinoma now s/p robotic converted to open partial hepatectomy (segments 2 and 3) on 12/3. Post-op course notable for oliguric NIDHI (resolving)    PLAN:  - continue PCA  - incentive spirometer  - IV metoprolol in place of home PO regimen; holding home amlodipine, aspirin, lipitor for now  - maintain telemetry  - advance to FLD  - maintain   - keep soriano catheter for now  - strict I/O  - f/u AM Cr and K  - daily LFTs and coag panels  - drain bilirubin level on POD 4 (12/7)  - hypoglycemia protocol  - PT, OOB  - SCDs, SQH    d/w Dr. Josephine Gibbs  MD  General Surgery PGY5  Surgical Oncology - [facility] [id number]

## 2024-12-05 NOTE — PROGRESS NOTES
12/05/24 1200   Discharge Planning   Living Arrangements Spouse/significant other   Support Systems Spouse/significant other   Type of Residence Private residence   Number of Stairs to Enter Residence 12   Do you have animals or pets at home? No   Who is requesting discharge planning? Provider   Home or Post Acute Services Post acute facilities (Rehab/SNF/etc)   Type of Post Acute Facility Services Skilled nursing   Expected Discharge Disposition SNF   Financial Resource Strain   How hard is it for you to pay for the very basics like food, housing, medical care, and heating? Not hard   Housing Stability   In the last 12 months, was there a time when you were not able to pay the mortgage or rent on time? N   In the past 12 months, how many times have you moved where you were living? 0   At any time in the past 12 months, were you homeless or living in a shelter (including now)? N   Transportation Needs   In the past 12 months, has lack of transportation kept you from medical appointments or from getting medications? no   In the past 12 months, has lack of transportation kept you from meetings, work, or from getting things needed for daily living? No     SW following for discharge planning.  SW met with pt and daughter to obtain SNF choices.  Pt/daughter provided, in order of preference, [facility].  Anticipate pt to discharge next week.  Referrals sent.  SW to continue to follow and advise of acceptance when known.  Elizabeth Gunsalus LISW-S  Care Transitions Supervisor

## 2024-12-05 NOTE — PROGRESS NOTES
Physical Therapy                 Therapy Communication Note    Patient Name: Elsa Castellano  MRN: [id number]  Department: [facility]  Room: 600/6004-A  Today's Date: 12/5/2024     Discipline: Physical Therapy    Missed Visit Reason: Missed Visit Reason: Other (Comment)    Missed Time: Attempt    Comment:  PT treatment attempted; pt reports she was up in chair earlier today and would like to rest now. Encouragement provided but politely declined at this time.

## 2024-12-06 ENCOUNTER — TELEPHONE (OUTPATIENT)
Dept: CARDIOLOGY | Facility: CLINIC | Age: 74
End: 2024-12-06
Payer: COMMERCIAL

## 2024-12-06 LAB
ALBUMIN SERPL BCP-MCNC: 2.7 G/DL (ref 3.4–5)
ALP SERPL-CCNC: 40 U/L (ref 33–136)
ALT SERPL W P-5'-P-CCNC: 128 U/L (ref 7–45)
ANION GAP SERPL CALC-SCNC: 11 MMOL/L (ref 10–20)
AST SERPL W P-5'-P-CCNC: 72 U/L (ref 9–39)
BILIRUB SERPL-MCNC: 0.5 MG/DL (ref 0–1.2)
BUN SERPL-MCNC: 28 MG/DL (ref 6–23)
CALCIUM SERPL-MCNC: 7.5 MG/DL (ref 8.6–10.6)
CHLORIDE SERPL-SCNC: 110 MMOL/L (ref 98–107)
CO2 SERPL-SCNC: 21 MMOL/L (ref 21–32)
CREAT SERPL-MCNC: 1.09 MG/DL (ref 0.5–1.05)
EGFRCR SERPLBLD CKD-EPI 2021: 53 ML/MIN/1.73M*2
ERYTHROCYTE [DISTWIDTH] IN BLOOD BY AUTOMATED COUNT: 14.6 % (ref 11.5–14.5)
GLUCOSE SERPL-MCNC: 105 MG/DL (ref 74–99)
HCT VFR BLD AUTO: 27.2 % (ref 36–46)
HGB BLD-MCNC: 8.8 G/DL (ref 12–16)
MAGNESIUM SERPL-MCNC: 1.8 MG/DL (ref 1.6–2.4)
MCH RBC QN AUTO: 31.4 PG (ref 26–34)
MCHC RBC AUTO-ENTMCNC: 32.4 G/DL (ref 32–36)
MCV RBC AUTO: 97 FL (ref 80–100)
NRBC BLD-RTO: 0 /100 WBCS (ref 0–0)
PLATELET # BLD AUTO: 110 X10*3/UL (ref 150–450)
POTASSIUM SERPL-SCNC: 3.9 MMOL/L (ref 3.5–5.3)
PROT SERPL-MCNC: 4.7 G/DL (ref 6.4–8.2)
RBC # BLD AUTO: 2.8 X10*6/UL (ref 4–5.2)
SODIUM SERPL-SCNC: 138 MMOL/L (ref 136–145)
WBC # BLD AUTO: 8.7 X10*3/UL (ref 4.4–11.3)

## 2024-12-06 PROCEDURE — 85027 COMPLETE CBC AUTOMATED: CPT | Performed by: STUDENT IN AN ORGANIZED HEALTH CARE EDUCATION/TRAINING PROGRAM

## 2024-12-06 PROCEDURE — 94640 AIRWAY INHALATION TREATMENT: CPT

## 2024-12-06 PROCEDURE — 2500000004 HC RX 250 GENERAL PHARMACY W/ HCPCS (ALT 636 FOR OP/ED): Performed by: NURSE PRACTITIONER

## 2024-12-06 PROCEDURE — 84075 ASSAY ALKALINE PHOSPHATASE: CPT | Performed by: STUDENT IN AN ORGANIZED HEALTH CARE EDUCATION/TRAINING PROGRAM

## 2024-12-06 PROCEDURE — 97530 THERAPEUTIC ACTIVITIES: CPT | Mod: GP

## 2024-12-06 PROCEDURE — 2500000004 HC RX 250 GENERAL PHARMACY W/ HCPCS (ALT 636 FOR OP/ED)

## 2024-12-06 PROCEDURE — 2500000002 HC RX 250 W HCPCS SELF ADMINISTERED DRUGS (ALT 637 FOR MEDICARE OP, ALT 636 FOR OP/ED): Performed by: STUDENT IN AN ORGANIZED HEALTH CARE EDUCATION/TRAINING PROGRAM

## 2024-12-06 PROCEDURE — 1200000003 HC ONCOLOGY  ROOM WITH TELEMETRY DAILY

## 2024-12-06 PROCEDURE — 2500000004 HC RX 250 GENERAL PHARMACY W/ HCPCS (ALT 636 FOR OP/ED): Performed by: STUDENT IN AN ORGANIZED HEALTH CARE EDUCATION/TRAINING PROGRAM

## 2024-12-06 PROCEDURE — 36415 COLL VENOUS BLD VENIPUNCTURE: CPT | Performed by: STUDENT IN AN ORGANIZED HEALTH CARE EDUCATION/TRAINING PROGRAM

## 2024-12-06 PROCEDURE — 2500000005 HC RX 250 GENERAL PHARMACY W/O HCPCS: Performed by: STUDENT IN AN ORGANIZED HEALTH CARE EDUCATION/TRAINING PROGRAM

## 2024-12-06 PROCEDURE — 97110 THERAPEUTIC EXERCISES: CPT | Mod: GP

## 2024-12-06 PROCEDURE — 2500000001 HC RX 250 WO HCPCS SELF ADMINISTERED DRUGS (ALT 637 FOR MEDICARE OP): Performed by: STUDENT IN AN ORGANIZED HEALTH CARE EDUCATION/TRAINING PROGRAM

## 2024-12-06 PROCEDURE — 83735 ASSAY OF MAGNESIUM: CPT | Performed by: STUDENT IN AN ORGANIZED HEALTH CARE EDUCATION/TRAINING PROGRAM

## 2024-12-06 RX ORDER — HYDROMORPHONE HCL/0.9% NACL/PF 15 MG/30ML
PATIENT CONTROLLED ANALGESIA SYRINGE INTRAVENOUS CONTINUOUS
Status: DISCONTINUED | OUTPATIENT
Start: 2024-12-06 | End: 2024-12-07

## 2024-12-06 RX ORDER — METOPROLOL SUCCINATE 50 MG/1
50 TABLET, EXTENDED RELEASE ORAL DAILY
Status: DISCONTINUED | OUTPATIENT
Start: 2024-12-06 | End: 2024-12-06

## 2024-12-06 RX ORDER — SODIUM CHLORIDE 9 MG/ML
40 INJECTION, SOLUTION INTRAVENOUS CONTINUOUS
Status: DISCONTINUED | OUTPATIENT
Start: 2024-12-06 | End: 2024-12-07

## 2024-12-06 RX ORDER — IPRATROPIUM BROMIDE AND ALBUTEROL SULFATE 2.5; .5 MG/3ML; MG/3ML
3 SOLUTION RESPIRATORY (INHALATION)
Status: DISCONTINUED | OUTPATIENT
Start: 2024-12-07 | End: 2024-12-07

## 2024-12-06 RX ORDER — METOPROLOL TARTRATE 25 MG/1
25 TABLET, FILM COATED ORAL 2 TIMES DAILY
Status: DISPENSED | OUTPATIENT
Start: 2024-12-06

## 2024-12-06 RX ORDER — ACETAMINOPHEN 325 MG/1
650 TABLET ORAL EVERY 6 HOURS
Status: DISCONTINUED | OUTPATIENT
Start: 2024-12-06 | End: 2024-12-07

## 2024-12-06 RX ORDER — OXYCODONE HYDROCHLORIDE 10 MG/1
10 TABLET ORAL EVERY 4 HOURS PRN
Status: DISCONTINUED | OUTPATIENT
Start: 2024-12-06 | End: 2024-12-06

## 2024-12-06 RX ORDER — OXYCODONE HYDROCHLORIDE 5 MG/1
5 TABLET ORAL EVERY 4 HOURS PRN
Status: DISCONTINUED | OUTPATIENT
Start: 2024-12-06 | End: 2024-12-06

## 2024-12-06 RX ORDER — IPRATROPIUM BROMIDE AND ALBUTEROL SULFATE 2.5; .5 MG/3ML; MG/3ML
3 SOLUTION RESPIRATORY (INHALATION)
Status: DISCONTINUED | OUTPATIENT
Start: 2024-12-06 | End: 2024-12-06

## 2024-12-06 RX ORDER — NALOXONE HYDROCHLORIDE 0.4 MG/ML
0.2 INJECTION, SOLUTION INTRAMUSCULAR; INTRAVENOUS; SUBCUTANEOUS AS NEEDED
Status: ACTIVE | OUTPATIENT
Start: 2024-12-06

## 2024-12-06 ASSESSMENT — PAIN DESCRIPTION - LOCATION
LOCATION: ABDOMEN

## 2024-12-06 ASSESSMENT — PAIN SCALES - GENERAL
PAINLEVEL_OUTOF10: 5 - MODERATE PAIN
PAINLEVEL_OUTOF10: 5 - MODERATE PAIN
PAINLEVEL_OUTOF10: 3
PAINLEVEL_OUTOF10: 5 - MODERATE PAIN
PAINLEVEL_OUTOF10: 3
PAINLEVEL_OUTOF10: 3
PAINLEVEL_OUTOF10: 6

## 2024-12-06 ASSESSMENT — COGNITIVE AND FUNCTIONAL STATUS - GENERAL
DRESSING REGULAR UPPER BODY CLOTHING: A LITTLE
TOILETING: A LITTLE
MOVING FROM LYING ON BACK TO SITTING ON SIDE OF FLAT BED WITH BEDRAILS: A LITTLE
MOVING TO AND FROM BED TO CHAIR: A LITTLE
MOBILITY SCORE: 11
TURNING FROM BACK TO SIDE WHILE IN FLAT BAD: A LOT
STANDING UP FROM CHAIR USING ARMS: A LOT
MOVING FROM LYING ON BACK TO SITTING ON SIDE OF FLAT BED WITH BEDRAILS: A LITTLE
MOVING FROM LYING ON BACK TO SITTING ON SIDE OF FLAT BED WITH BEDRAILS: A LOT
MOVING TO AND FROM BED TO CHAIR: A LOT
DRESSING REGULAR LOWER BODY CLOTHING: A LITTLE
TURNING FROM BACK TO SIDE WHILE IN FLAT BAD: A LITTLE
WALKING IN HOSPITAL ROOM: A LOT
STANDING UP FROM CHAIR USING ARMS: A LITTLE
TURNING FROM BACK TO SIDE WHILE IN FLAT BAD: A LITTLE
DAILY ACTIVITIY SCORE: 20
DAILY ACTIVITIY SCORE: 20
MOBILITY SCORE: 17
MOVING TO AND FROM BED TO CHAIR: A LITTLE
DRESSING REGULAR LOWER BODY CLOTHING: A LITTLE
TOILETING: A LITTLE
MOBILITY SCORE: 17
HELP NEEDED FOR BATHING: A LITTLE
CLIMB 3 TO 5 STEPS WITH RAILING: A LOT
HELP NEEDED FOR BATHING: A LITTLE
CLIMB 3 TO 5 STEPS WITH RAILING: A LOT
WALKING IN HOSPITAL ROOM: A LITTLE
DRESSING REGULAR UPPER BODY CLOTHING: A LITTLE
WALKING IN HOSPITAL ROOM: A LITTLE
STANDING UP FROM CHAIR USING ARMS: A LITTLE
CLIMB 3 TO 5 STEPS WITH RAILING: TOTAL

## 2024-12-06 ASSESSMENT — PAIN DESCRIPTION - ORIENTATION
ORIENTATION: ANTERIOR
ORIENTATION: MID

## 2024-12-06 ASSESSMENT — PAIN SCALES - PAIN ASSESSMENT IN ADVANCED DEMENTIA (PAINAD): TOTALSCORE: MEDICATION (SEE MAR);DISTRACTION;REST

## 2024-12-06 ASSESSMENT — PAIN - FUNCTIONAL ASSESSMENT
PAIN_FUNCTIONAL_ASSESSMENT: 0-10
PAIN_FUNCTIONAL_ASSESSMENT: 0-10

## 2024-12-06 NOTE — SIGNIFICANT EVENT
Rapid Response Nurse Note: RADAR alert: 6    Pager time: 153  Arrival time: 1545  Event end time:   Location: [facility]  [x] Triage by phone or secure messaging    Rapid response initiated by:  [] Rapid response RN [] Family [] Nursing Supervisor [] Physician   [x] RADAR auto page [] Sepsis auto-page [] RN [] RT   [] NP/PA [] Other:     Primary reason for call:   [] BAT [] New CPAP/BiPAP [] Bleeding [] Change in mental status   [] Chest pain [] Code blue [] FiO2 >/= 50% [] HR </= 40 bpm   [] HR >/= 130 bpm [] Hyperglycemia [] Hypoglycemia [x] RADAR    [] RR </= 8 bpm [] RR >/= 30 bpm [] SBP </= 90 mmHg [] SpO2 < 90%   [] Seizure [] Sepsis [] Shortness of breath  [] Staff concern: see comments     Initial VS and/or RADAR VS: T 36.4 °C; HR 67; RR 24; /74; SPO2 92%.    Providers present at bedside (if applicable): NA    Name of ICU Provider contacted (if applicable): NA    Interventions:  [x] None [] ABG/VBG [] Assist w/ICU transfer [] BAT paged    [] Bag mask [] Blood [] Cardioversion [] Code Blue   [] Code blue for intubation [] Code status changed [] Chest x-ray [] EKG   [] IV fluid/bolus [] KUB x-ray [] Labs/cultures [] Medication   [] Nebulizer treatment [] NIPPV (CPAP/BiPAP) [] Oxygen [] Oral airway   [] Peripheral IV [] Palliative care consult [] CT/MRI [] Sepsis protocol    [] Suctioned [] Other:     Outcome:  [] Coded and  [] Code blue for intubation [] Coded and transferred to ICU []  on division   [x] Remained on division (no change) [] Remained on division + additional monitoring [] Remained in ED [] Transferred to ED   [] Transferred to ICU [] Transferred to inpatient status [] Transferred for interventions (procedure) [] Transferred to ICU stepdown    [] Transferred to surgery [] Transferred to telemetry [] Sepsis protocol [] STEMI protocol   [] Stroke protocol [x] Bedside nurse instructed to page rapid response for any concerns or acute change in condition/VS     Additional Comments:    Reviewed above VS with bedside RN via phone.  Per bedside RN, tachypnea appears to be related to pt ambulating to bathroom. No acute change in condition.  No interventions by rapid response team indicated at this time.  Staff to page rapid response for any concerns or acute change in condition or VS. Rogelio Frias RN.

## 2024-12-06 NOTE — PROGRESS NOTES
Physical Therapy    Physical Therapy Treatment    Patient Name: Elsa Castellano  MRN: [id number]  Department: [facility]  Room: [facility]-A  Today's Date: 12/6/2024  Time Calculation  Start Time: 1234  Stop Time: 1258  Time Calculation (min): 24 min         Assessment/Plan   PT Assessment  Evaluation/Treatment Tolerance: Patient limited by fatigue  Medical Staff Made Aware: Yes  Barriers to Participation:  (None)  End of Session Communication: Bedside nurse  Assessment Comment: Pt presented with decreased endurance only able tolerate light activity and transfers with mod A and lateral step ambulation with min A. pt continues to rec mod.  End of Session Patient Position: Bed, 3 rail up, Alarm off, not on at start of session     PT Plan  Treatment/Interventions: Bed mobility, Transfer training, Gait training, Stair training, Therapeutic activity  PT Plan: Ongoing PT  PT Frequency: 3 times per week  PT Discharge Recommendations: Moderate intensity level of continued care  PT Recommended Transfer Status: Assist x2  PT - OK to Discharge: Yes (Pt eval completed; d/c rec assessed)      General Visit Information:   PT  Visit  PT Received On: 12/06/24  Response to Previous Treatment: Patient with no complaints from previous session.  General  Prior to Session Communication: Bedside nurse  Patient Position Received: Up in chair, Alarm off, not on at start of session  Preferred Learning Style: verbal  General Comment: RN cleared pt PT; pt receptive to therapy. Pt reported feeling tired today after being up in chair for a couple hours.    Subjective   Precautions:  Precautions  Medical Precautions: Fall precautions  Post-Surgical Precautions: Abdominal surgery precautions    Vital Signs (Past 2hrs)                 Objective   Pain:  Pain Assessment  Pain Assessment: 0-10  0-10 (Numeric) Pain Score: 5 - Moderate pain  Pain Type: Surgical pain, Acute pain  Pain Location: Abdomen  Pain Interventions: Ambulation/increased activity,  Repositioned  Cognition:  Cognition  Overall Cognitive Status: Within Functional Limits  Orientation Level: Oriented X4  Coordination:     Postural Control:  Static Sitting Balance  Static Sitting-Balance Support: Feet supported  Static Sitting-Level of Assistance: Contact guard  Static Sitting-Comment/Number of Minutes: 10 min  Static Standing Balance  Static Standing-Balance Support: Bilateral upper extremity supported  Static Standing-Level of Assistance: Contact guard  Static Standing-Comment/Number of Minutes: 3 min     Activity Tolerance:  Activity Tolerance  Endurance: Tolerates 10 - 20 min exercise with multiple rests  Early Mobility/Exercise Safety Screen: Proceed with mobilization - No exclusion criteria met  Activity Tolerance Comments: Pt limited due to decreased endurance  Treatments:  Therapeutic Exercise  Therapeutic Exercise Performed: Yes  Therapeutic Exercise Activity 1: x10 each knee flexion; supine  Therapeutic Exercise Activity 2: x10 Ankle pumps; supine    Therapeutic Activity  Therapeutic Activity Performed: Yes  Therapeutic Activity 1: Pt able to complete transfers with mod A x1, ambulate utilizing lateral steps with min A, and perform bed mobility wit mod A x1. Pt edu on bed mobility sequencing and importance of OOB activity.    Bed Mobility  Bed Mobility: Yes  Bed Mobility 1  Bed Mobility 1: Sitting to supine  Level of Assistance 1: Moderate assistance  Bed Mobility Comments 1: Pt cues for hand placement and log roll sequencing    Ambulation/Gait Training  Ambulation/Gait Training Performed: Yes  Ambulation/Gait Training 1  Surface 1: Level tile  Device 1: Rolling walker  Assistance 1: Minimum assistance  Quality of Gait 1: Decreased step length, Shuffling gait  Comments/Distance (ft) 1: 5' to bed utilizing lateral steps to L; 2' to HOB utilizing lateral steps to R  Transfers  Transfer: Yes  Transfer 1  Transfer From 1: Sit to, Stand to  Transfer to 1: Stand, Sit  Technique 1: Sit to stand,  Stand to sit  Transfer Device 1: Walker  Transfer Level of Assistance 1: Moderate assistance  Trials/Comments 1: Stand from chair x1; sit to EOB x1    Outcome Measures:  [facility] Basic Mobility  Turning from your back to your side while in a flat bed without using bedrails: A lot  Moving from lying on your back to sitting on the side of a flat bed without using bedrails: A lot  Moving to and from bed to chair (including a wheelchair): A lot  Standing up from a chair using your arms (e.g. wheelchair or bedside chair): A lot  To walk in hospital room: A lot  Climbing 3-5 steps with railing: Total  Basic Mobility - Total Score: 11    Education Documentation  Precautions, taught by JEFERSON Courtney at 12/6/2024  1:54 PM.  Learner: Patient  Readiness: Acceptance  Method: Explanation  Response: Verbalizes Understanding  Comment: Edu on FWW sequencing, log roll sequencing when returning to supine, and importance of OOB activity throughout the day.    Body Mechanics, taught by JEFERSON Courtney at 12/6/2024  1:54 PM.  Learner: Patient  Readiness: Acceptance  Method: Explanation  Response: Verbalizes Understanding  Comment: Edu on FWW sequencing, log roll sequencing when returning to supine, and importance of OOB activity throughout the day.    Mobility Training, taught by JEFERSON Courtney at 12/6/2024  1:54 PM.  Learner: Patient  Readiness: Acceptance  Method: Explanation  Response: Verbalizes Understanding  Comment: Edu on FWW sequencing, log roll sequencing when returning to supine, and importance of OOB activity throughout the day.    Education Comments  No comments found.        OP EDUCATION:       Encounter Problems       Encounter Problems (Active)       Mobility       LTG - Patient will navigate 2 steps with LRAD with CGA  (Progressing)       Start:  12/04/24    Expected End:  12/25/24            STG - Patient will ambulate >150ft with LRAD utilizing SBA (Progressing)       Start:  12/04/24    Expected End:   12/25/24               PT Transfers       STG - Patient will perform bed mobility with SBA (Progressing)       Start:  12/04/24    Expected End:  12/25/24            STG - Patient will transfer sit to and from stand with LRAD with SBA  (Progressing)       Start:  12/04/24    Expected End:  12/25/24               Pain - Adult

## 2024-12-06 NOTE — PROGRESS NOTES
[facility]  DEPARTMENT OF SURGERY    PROGRESS NOTE  SURGICAL ONCOLOGY    SUBJECTIVE  No acute events overnight. Seen and assessed at bedside this AM. No acute complaints, reports she rested well. UOP over last 24hrs improving. Pain controlled. Denies nausea/vomiting. Tolerating liquids PO, though states that she is taking it slow.    L abd drain: 250 ml serosang, from 130ml prior    OBJECTIVE  Vitals:  Temp:  [36 °C (96.8 °F)-36.5 °C (97.7 °F)] 36.4 °C (97.5 °F)  Heart Rate:  [89-99] 96  Resp:  [15-16] 16  BP: (104-122)/(48-73) 111/61    I/Os:  I/O last 3 completed shifts:  In: 5330.4 (59 mL/kg) [P.O.:200; I.V.:3630.4 (40.2 mL/kg); IV Piggyback:1500]  Out: 1325 (14.7 mL/kg) [Urine:1050 (0.3 mL/kg/hr); Drains:275]  Weight: 90.3 kg     Exam:  Gen:  NAD, non-toxic appearing  Eyes:  No scleral icterus  Card:  Regular rate  Resp:  Non-labored breathing on RA  Abd:  Soft, moderately tender along incision, non-distended    L abdominal drain with serosanguinous output  Ext:  WWP, no swelling of BLE  MSK:  HINSON  Psych:  Appropriate mood and affect    Labs:  CBC:   Lab Results   Component Value Date    WBC 8.7 12/06/2024    RBC 2.80 (L) 12/06/2024     BMP:   Lab Results   Component Value Date    GLUCOSE 105 (H) 12/06/2024    CO2 21 12/06/2024    BUN 28 (H) 12/06/2024    CREATININE 1.09 (H) 12/06/2024    CALCIUM 7.5 (L) 12/06/2024     Coagulation:   Lab Results   Component Value Date    INR 1.1 12/05/2024    APTT 24 (L) 12/05/2024       Imaging:  None new      ASSESSMENT:  72 yo F with COPD, anxiety, prior MI, HTN, and pre-DM with liver adenocarcinoma now s/p robotic converted to open partial hepatectomy (segments 2 and 3) on 12/3. Post-op course notable for oliguric NIDHI (resolving).    PLAN:  - continue PCA for now  - incentive spirometer  - PO metoprolol tartrate. Restart home lipitor and aspirin  - Continue to hold home amlodipine   - maintain telemetry  - advance to regular soft diet  - NS  mIVF to 75cc/hr based on improved Cr today  - Remove Jensen today and start TOV  - strict I/O  - f/u AM Cr and K  - daily LFTs and coag panels  - drain bilirubin level 12/7 @ 2am  - hypoglycemia protocol  - PT, OOB  - SCDs, SQH    d/w Dr. Josephine Bass MD  General Surgery PGY1  Surgical Oncology - [facility] [id number]

## 2024-12-06 NOTE — CARE PLAN
Problem: Pain - Adult  Goal: Verbalizes/displays adequate comfort level or baseline comfort level  Outcome: Progressing     Problem: Safety - Adult  Goal: Free from fall injury  Outcome: Progressing     Problem: Discharge Planning  Goal: Discharge to home or other facility with appropriate resources  Outcome: Progressing     Problem: Chronic Conditions and Co-morbidities  Goal: Patient's chronic conditions and co-morbidity symptoms are monitored and maintained or improved  Outcome: Progressing      The clinical goals for the shift include patient to remain HDS throughout shift

## 2024-12-07 LAB
ALBUMIN SERPL BCP-MCNC: 2.8 G/DL (ref 3.4–5)
ALP SERPL-CCNC: 60 U/L (ref 33–136)
ALT SERPL W P-5'-P-CCNC: 93 U/L (ref 7–45)
ANION GAP SERPL CALC-SCNC: 12 MMOL/L (ref 10–20)
AST SERPL W P-5'-P-CCNC: 53 U/L (ref 9–39)
BILIRUB FLD-MCNC: 1.1 MG/DL
BILIRUB SERPL-MCNC: 0.6 MG/DL (ref 0–1.2)
BUN SERPL-MCNC: 15 MG/DL (ref 6–23)
CALCIUM SERPL-MCNC: 7.7 MG/DL (ref 8.6–10.6)
CHLORIDE SERPL-SCNC: 113 MMOL/L (ref 98–107)
CO2 SERPL-SCNC: 18 MMOL/L (ref 21–32)
CREAT SERPL-MCNC: 0.68 MG/DL (ref 0.5–1.05)
EGFRCR SERPLBLD CKD-EPI 2021: >90 ML/MIN/1.73M*2
ERYTHROCYTE [DISTWIDTH] IN BLOOD BY AUTOMATED COUNT: 14.4 % (ref 11.5–14.5)
GLUCOSE SERPL-MCNC: 95 MG/DL (ref 74–99)
HCT VFR BLD AUTO: 31.5 % (ref 36–46)
HGB BLD-MCNC: 10.4 G/DL (ref 12–16)
MAGNESIUM SERPL-MCNC: 2.38 MG/DL (ref 1.6–2.4)
MCH RBC QN AUTO: 31.7 PG (ref 26–34)
MCHC RBC AUTO-ENTMCNC: 33 G/DL (ref 32–36)
MCV RBC AUTO: 96 FL (ref 80–100)
NRBC BLD-RTO: 0.2 /100 WBCS (ref 0–0)
PLATELET # BLD AUTO: 143 X10*3/UL (ref 150–450)
POTASSIUM SERPL-SCNC: 4.7 MMOL/L (ref 3.5–5.3)
PROT SERPL-MCNC: 5.1 G/DL (ref 6.4–8.2)
RBC # BLD AUTO: 3.28 X10*6/UL (ref 4–5.2)
SODIUM SERPL-SCNC: 138 MMOL/L (ref 136–145)
WBC # BLD AUTO: 9.6 X10*3/UL (ref 4.4–11.3)

## 2024-12-07 PROCEDURE — 2500000004 HC RX 250 GENERAL PHARMACY W/ HCPCS (ALT 636 FOR OP/ED): Performed by: STUDENT IN AN ORGANIZED HEALTH CARE EDUCATION/TRAINING PROGRAM

## 2024-12-07 PROCEDURE — 94640 AIRWAY INHALATION TREATMENT: CPT

## 2024-12-07 PROCEDURE — 2500000004 HC RX 250 GENERAL PHARMACY W/ HCPCS (ALT 636 FOR OP/ED)

## 2024-12-07 PROCEDURE — 2500000001 HC RX 250 WO HCPCS SELF ADMINISTERED DRUGS (ALT 637 FOR MEDICARE OP): Performed by: STUDENT IN AN ORGANIZED HEALTH CARE EDUCATION/TRAINING PROGRAM

## 2024-12-07 PROCEDURE — 82247 BILIRUBIN TOTAL: CPT | Performed by: STUDENT IN AN ORGANIZED HEALTH CARE EDUCATION/TRAINING PROGRAM

## 2024-12-07 PROCEDURE — 2500000005 HC RX 250 GENERAL PHARMACY W/O HCPCS: Performed by: STUDENT IN AN ORGANIZED HEALTH CARE EDUCATION/TRAINING PROGRAM

## 2024-12-07 PROCEDURE — 36415 COLL VENOUS BLD VENIPUNCTURE: CPT | Performed by: STUDENT IN AN ORGANIZED HEALTH CARE EDUCATION/TRAINING PROGRAM

## 2024-12-07 PROCEDURE — 83735 ASSAY OF MAGNESIUM: CPT | Performed by: STUDENT IN AN ORGANIZED HEALTH CARE EDUCATION/TRAINING PROGRAM

## 2024-12-07 PROCEDURE — 80053 COMPREHEN METABOLIC PANEL: CPT | Performed by: STUDENT IN AN ORGANIZED HEALTH CARE EDUCATION/TRAINING PROGRAM

## 2024-12-07 PROCEDURE — 2500000004 HC RX 250 GENERAL PHARMACY W/ HCPCS (ALT 636 FOR OP/ED): Performed by: NURSE PRACTITIONER

## 2024-12-07 PROCEDURE — 2500000001 HC RX 250 WO HCPCS SELF ADMINISTERED DRUGS (ALT 637 FOR MEDICARE OP)

## 2024-12-07 PROCEDURE — 93010 ELECTROCARDIOGRAM REPORT: CPT | Performed by: INTERNAL MEDICINE

## 2024-12-07 PROCEDURE — 85027 COMPLETE CBC AUTOMATED: CPT | Performed by: STUDENT IN AN ORGANIZED HEALTH CARE EDUCATION/TRAINING PROGRAM

## 2024-12-07 PROCEDURE — 2500000002 HC RX 250 W HCPCS SELF ADMINISTERED DRUGS (ALT 637 FOR MEDICARE OP, ALT 636 FOR OP/ED): Performed by: STUDENT IN AN ORGANIZED HEALTH CARE EDUCATION/TRAINING PROGRAM

## 2024-12-07 PROCEDURE — 1200000003 HC ONCOLOGY  ROOM WITH TELEMETRY DAILY

## 2024-12-07 RX ORDER — ACETAMINOPHEN 325 MG/1
650 TABLET ORAL EVERY 6 HOURS
Status: DISCONTINUED | OUTPATIENT
Start: 2024-12-07 | End: 2024-12-07

## 2024-12-07 RX ORDER — ACETAMINOPHEN 325 MG/1
650 TABLET ORAL EVERY 6 HOURS
Status: DISPENSED | OUTPATIENT
Start: 2024-12-07

## 2024-12-07 RX ORDER — OXYCODONE HYDROCHLORIDE 5 MG/1
5 TABLET ORAL EVERY 4 HOURS PRN
Status: DISPENSED | OUTPATIENT
Start: 2024-12-07

## 2024-12-07 RX ORDER — TRAMADOL HYDROCHLORIDE 50 MG/1
50 TABLET ORAL EVERY 4 HOURS PRN
Status: DISPENSED | OUTPATIENT
Start: 2024-12-07

## 2024-12-07 RX ORDER — IPRATROPIUM BROMIDE AND ALBUTEROL SULFATE 2.5; .5 MG/3ML; MG/3ML
3 SOLUTION RESPIRATORY (INHALATION) EVERY 4 HOURS PRN
Status: ACTIVE | OUTPATIENT
Start: 2024-12-07

## 2024-12-07 RX ORDER — FUROSEMIDE 10 MG/ML
20 INJECTION INTRAMUSCULAR; INTRAVENOUS ONCE
Status: COMPLETED | OUTPATIENT
Start: 2024-12-07 | End: 2024-12-07

## 2024-12-07 RX ORDER — HYDROMORPHONE HYDROCHLORIDE 1 MG/ML
0.2 INJECTION, SOLUTION INTRAMUSCULAR; INTRAVENOUS; SUBCUTANEOUS EVERY 4 HOURS PRN
Status: ACTIVE | OUTPATIENT
Start: 2024-12-07

## 2024-12-07 RX ORDER — ENOXAPARIN SODIUM 100 MG/ML
40 INJECTION SUBCUTANEOUS DAILY
Status: DISPENSED | OUTPATIENT
Start: 2024-12-07

## 2024-12-07 ASSESSMENT — PAIN - FUNCTIONAL ASSESSMENT
PAIN_FUNCTIONAL_ASSESSMENT: 0-10
PAIN_FUNCTIONAL_ASSESSMENT: 0-10

## 2024-12-07 ASSESSMENT — PAIN SCALES - GENERAL
PAINLEVEL_OUTOF10: 6
PAINLEVEL_OUTOF10: 5 - MODERATE PAIN
PAINLEVEL_OUTOF10: 7
PAINLEVEL_OUTOF10: 5 - MODERATE PAIN
PAINLEVEL_OUTOF10: 4
PAINLEVEL_OUTOF10: 5 - MODERATE PAIN
PAINLEVEL_OUTOF10: 5 - MODERATE PAIN

## 2024-12-07 ASSESSMENT — COGNITIVE AND FUNCTIONAL STATUS - GENERAL
DAILY ACTIVITIY SCORE: 20
HELP NEEDED FOR BATHING: A LITTLE
MOVING FROM LYING ON BACK TO SITTING ON SIDE OF FLAT BED WITH BEDRAILS: A LITTLE
TURNING FROM BACK TO SIDE WHILE IN FLAT BAD: A LITTLE
CLIMB 3 TO 5 STEPS WITH RAILING: A LOT
STANDING UP FROM CHAIR USING ARMS: A LITTLE
MOBILITY SCORE: 17
DRESSING REGULAR LOWER BODY CLOTHING: A LITTLE
TOILETING: A LITTLE
DRESSING REGULAR UPPER BODY CLOTHING: A LITTLE
MOVING TO AND FROM BED TO CHAIR: A LITTLE
WALKING IN HOSPITAL ROOM: A LITTLE

## 2024-12-07 ASSESSMENT — PAIN DESCRIPTION - LOCATION
LOCATION: ABDOMEN
LOCATION: ABDOMEN
LOCATION: HEAD
LOCATION: ABDOMEN

## 2024-12-07 ASSESSMENT — PAIN DESCRIPTION - ORIENTATION
ORIENTATION: MID
ORIENTATION: ANTERIOR
ORIENTATION: MID

## 2024-12-07 NOTE — PROGRESS NOTES
[facility]  DEPARTMENT OF SURGERY    PROGRESS NOTE  SURGICAL ONCOLOGY    SUBJECTIVE  No acute events overnight. Seen and assessed at bedside this AM. No acute complaints. Patient had brief episode of tachycardia while using bedside commode that has resolved, she remained asymptomatic. Presents with some LUE swelling after prior PIV, denies any pain/weakness/numbness. She has new PIV in L dorsal hand. Denies nausea/vomiting or fever/chills.      L abd drain: 255 ml serosang, from 250ml prior    OBJECTIVE  Vitals:  Temp:  [36.1 °C (97 °F)-36.5 °C (97.7 °F)] 36.4 °C (97.5 °F)  Heart Rate:  [67-98] 98  Resp:  [15-24] 16  BP: (116-154)/(64-84) 154/84    I/Os:  I/O last 3 completed shifts:  In: 1003.3 (11.1 mL/kg) [I.V.:1003.3 (11.1 mL/kg)]  Out: 1655 (18.3 mL/kg) [Urine:1200 (0.4 mL/kg/hr); Drains:455]  Weight: 90.3 kg     Exam:  Gen:  NAD, non-toxic appearing  Eyes:  No scleral icterus  Card:  Regular rate  Resp:  Non-labored breathing on RA  Abd:  Soft, moderately tender along incision, non-distended    L abdominal drain with serosanguinous output  Ext:  WWP, no swelling of BLE  MSK:  HINSON  Psych:  Appropriate mood and affect    Labs:  CBC:   Lab Results   Component Value Date    WBC 8.7 12/06/2024    RBC 2.80 (L) 12/06/2024     BMP:   Lab Results   Component Value Date    GLUCOSE 95 12/07/2024    CO2 18 (L) 12/07/2024    BUN 15 12/07/2024    CREATININE 0.68 12/07/2024    CALCIUM 7.7 (L) 12/07/2024     Coagulation:   Lab Results   Component Value Date    INR 1.1 12/05/2024    APTT 24 (L) 12/05/2024       Imaging:  None new    ASSESSMENT:  72 yo F with COPD, anxiety, prior MI, HTN, and pre-DM with liver adenocarcinoma now s/p robotic converted to open partial hepatectomy (segments 2 and 3) on 12/3. Post-op course notable for oliguric NIDHI which as been resolving.     PLAN:  - Discontinue PCA, transition to PO pain medication  - incentive spirometer, wean O2 to RA, SpO2 >88%  - PO  metoprolol tartrate. Restart home lipitor and aspirin  - Restart home amlodipine   - maintain telemetry  - Regular diet, soft  - HLIV  - strict I/O  - f/u AM Cr and K  - daily LFTs and coag panels  - hypoglycemia protocol  - PT, OOB  - SCDs, SQH changed to lovenox    d/w Dr. Josephine Bass MD  General Surgery PGY1  Surgical Oncology - [facility] [id number]

## 2024-12-07 NOTE — CARE PLAN
Problem: Pain - Adult  Goal: Verbalizes/displays adequate comfort level or baseline comfort level  Outcome: Progressing     Problem: Safety - Adult  Goal: Free from fall injury  Outcome: Progressing     Problem: Discharge Planning  Goal: Discharge to home or other facility with appropriate resources  Outcome: Progressing     Problem: Chronic Conditions and Co-morbidities  Goal: Patient's chronic conditions and co-morbidity symptoms are monitored and maintained or improved  Outcome: Progressing       The clinical goals for the shift include patient to rate pain <6/10 by end of shift

## 2024-12-08 VITALS
HEIGHT: 67 IN | DIASTOLIC BLOOD PRESSURE: 72 MMHG | RESPIRATION RATE: 18 BRPM | SYSTOLIC BLOOD PRESSURE: 125 MMHG | BODY MASS INDEX: 31.25 KG/M2 | OXYGEN SATURATION: 93 % | WEIGHT: 199.08 LBS | HEART RATE: 78 BPM | TEMPERATURE: 97.7 F

## 2024-12-08 LAB
ALBUMIN SERPL BCP-MCNC: 3 G/DL (ref 3.4–5)
ALP SERPL-CCNC: 54 U/L (ref 33–136)
ALT SERPL W P-5'-P-CCNC: 74 U/L (ref 7–45)
ANION GAP SERPL CALC-SCNC: 12 MMOL/L (ref 10–20)
AST SERPL W P-5'-P-CCNC: 37 U/L (ref 9–39)
BILIRUB SERPL-MCNC: 0.8 MG/DL (ref 0–1.2)
BUN SERPL-MCNC: 8 MG/DL (ref 6–23)
CALCIUM SERPL-MCNC: 7.9 MG/DL (ref 8.6–10.6)
CHLORIDE SERPL-SCNC: 107 MMOL/L (ref 98–107)
CO2 SERPL-SCNC: 22 MMOL/L (ref 21–32)
CREAT SERPL-MCNC: 0.64 MG/DL (ref 0.5–1.05)
EGFRCR SERPLBLD CKD-EPI 2021: >90 ML/MIN/1.73M*2
ERYTHROCYTE [DISTWIDTH] IN BLOOD BY AUTOMATED COUNT: 14.3 % (ref 11.5–14.5)
GLUCOSE SERPL-MCNC: 106 MG/DL (ref 74–99)
HCT VFR BLD AUTO: 32.6 % (ref 36–46)
HGB BLD-MCNC: 11 G/DL (ref 12–16)
MAGNESIUM SERPL-MCNC: 2.02 MG/DL (ref 1.6–2.4)
MCH RBC QN AUTO: 30.8 PG (ref 26–34)
MCHC RBC AUTO-ENTMCNC: 33.7 G/DL (ref 32–36)
MCV RBC AUTO: 91 FL (ref 80–100)
NRBC BLD-RTO: 0.2 /100 WBCS (ref 0–0)
PLATELET # BLD AUTO: 149 X10*3/UL (ref 150–450)
POTASSIUM SERPL-SCNC: 3.4 MMOL/L (ref 3.5–5.3)
PROT SERPL-MCNC: 4.9 G/DL (ref 6.4–8.2)
RBC # BLD AUTO: 3.57 X10*6/UL (ref 4–5.2)
SODIUM SERPL-SCNC: 138 MMOL/L (ref 136–145)
WBC # BLD AUTO: 9.3 X10*3/UL (ref 4.4–11.3)

## 2024-12-08 PROCEDURE — 2500000001 HC RX 250 WO HCPCS SELF ADMINISTERED DRUGS (ALT 637 FOR MEDICARE OP)

## 2024-12-08 PROCEDURE — 2500000001 HC RX 250 WO HCPCS SELF ADMINISTERED DRUGS (ALT 637 FOR MEDICARE OP): Performed by: STUDENT IN AN ORGANIZED HEALTH CARE EDUCATION/TRAINING PROGRAM

## 2024-12-08 PROCEDURE — 2500000004 HC RX 250 GENERAL PHARMACY W/ HCPCS (ALT 636 FOR OP/ED): Performed by: STUDENT IN AN ORGANIZED HEALTH CARE EDUCATION/TRAINING PROGRAM

## 2024-12-08 PROCEDURE — 2500000004 HC RX 250 GENERAL PHARMACY W/ HCPCS (ALT 636 FOR OP/ED)

## 2024-12-08 PROCEDURE — 85027 COMPLETE CBC AUTOMATED: CPT

## 2024-12-08 PROCEDURE — 83735 ASSAY OF MAGNESIUM: CPT

## 2024-12-08 PROCEDURE — 80053 COMPREHEN METABOLIC PANEL: CPT

## 2024-12-08 PROCEDURE — 2500000002 HC RX 250 W HCPCS SELF ADMINISTERED DRUGS (ALT 637 FOR MEDICARE OP, ALT 636 FOR OP/ED)

## 2024-12-08 PROCEDURE — 2500000005 HC RX 250 GENERAL PHARMACY W/O HCPCS

## 2024-12-08 PROCEDURE — 1200000003 HC ONCOLOGY  ROOM WITH TELEMETRY DAILY

## 2024-12-08 PROCEDURE — 2500000005 HC RX 250 GENERAL PHARMACY W/O HCPCS: Performed by: STUDENT IN AN ORGANIZED HEALTH CARE EDUCATION/TRAINING PROGRAM

## 2024-12-08 PROCEDURE — 36415 COLL VENOUS BLD VENIPUNCTURE: CPT

## 2024-12-08 PROCEDURE — 94640 AIRWAY INHALATION TREATMENT: CPT

## 2024-12-08 RX ORDER — FUROSEMIDE 10 MG/ML
20 INJECTION INTRAMUSCULAR; INTRAVENOUS ONCE
Status: COMPLETED | OUTPATIENT
Start: 2024-12-08 | End: 2024-12-08

## 2024-12-08 RX ORDER — POTASSIUM CHLORIDE 20 MEQ/1
40 TABLET, EXTENDED RELEASE ORAL ONCE
Status: COMPLETED | OUTPATIENT
Start: 2024-12-08 | End: 2024-12-08

## 2024-12-08 RX ORDER — TALC
3 POWDER (GRAM) TOPICAL DAILY
Status: DISPENSED | OUTPATIENT
Start: 2024-12-08

## 2024-12-08 ASSESSMENT — PAIN SCALES - GENERAL
PAINLEVEL_OUTOF10: 5 - MODERATE PAIN
PAINLEVEL_OUTOF10: 5 - MODERATE PAIN
PAINLEVEL_OUTOF10: 4
PAINLEVEL_OUTOF10: 4
PAINLEVEL_OUTOF10: 5 - MODERATE PAIN
PAINLEVEL_OUTOF10: 4

## 2024-12-08 ASSESSMENT — COGNITIVE AND FUNCTIONAL STATUS - GENERAL
MOVING TO AND FROM BED TO CHAIR: A LITTLE
DAILY ACTIVITIY SCORE: 20
WALKING IN HOSPITAL ROOM: A LITTLE
DRESSING REGULAR UPPER BODY CLOTHING: A LITTLE
TOILETING: A LITTLE
MOBILITY SCORE: 18
TURNING FROM BACK TO SIDE WHILE IN FLAT BAD: A LITTLE
DAILY ACTIVITIY SCORE: 20
STANDING UP FROM CHAIR USING ARMS: A LITTLE
STANDING UP FROM CHAIR USING ARMS: A LITTLE
DRESSING REGULAR UPPER BODY CLOTHING: A LITTLE
TOILETING: A LITTLE
DRESSING REGULAR LOWER BODY CLOTHING: A LITTLE
HELP NEEDED FOR BATHING: A LITTLE
CLIMB 3 TO 5 STEPS WITH RAILING: A LOT
TURNING FROM BACK TO SIDE WHILE IN FLAT BAD: A LITTLE
CLIMB 3 TO 5 STEPS WITH RAILING: A LOT
MOBILITY SCORE: 18
MOVING TO AND FROM BED TO CHAIR: A LITTLE
HELP NEEDED FOR BATHING: A LITTLE
WALKING IN HOSPITAL ROOM: A LITTLE
DRESSING REGULAR LOWER BODY CLOTHING: A LITTLE

## 2024-12-08 ASSESSMENT — PAIN - FUNCTIONAL ASSESSMENT
PAIN_FUNCTIONAL_ASSESSMENT: 0-10
PAIN_FUNCTIONAL_ASSESSMENT: 0-10

## 2024-12-08 ASSESSMENT — PAIN DESCRIPTION - LOCATION
LOCATION: ABDOMEN

## 2024-12-08 ASSESSMENT — PAIN DESCRIPTION - ORIENTATION
ORIENTATION: MID
ORIENTATION: MID;LOWER

## 2024-12-08 ASSESSMENT — ACTIVITIES OF DAILY LIVING (ADL): LACK_OF_TRANSPORTATION: NO

## 2024-12-08 NOTE — CARE PLAN
Problem: Pain - Adult  Goal: Verbalizes/displays adequate comfort level or baseline comfort level  Outcome: Progressing     Problem: Safety - Adult  Goal: Free from fall injury  Outcome: Progressing     Problem: Discharge Planning  Goal: Discharge to home or other facility with appropriate resources  Outcome: Progressing     Problem: Chronic Conditions and Co-morbidities  Goal: Patient's chronic conditions and co-morbidity symptoms are monitored and maintained or improved  Outcome: Progressing      The clinical goals for the shift include patient to rate pain <5/10 by end of shift

## 2024-12-08 NOTE — PROGRESS NOTES
12/08/24 1000   Discharge Planning   Living Arrangements Spouse/significant other   Support Systems Spouse/significant other   Type of Residence Private residence   Number of Stairs to Enter Residence 12   Do you have animals or pets at home? No   Who is requesting discharge planning? Provider   Home or Post Acute Services Post acute facilities (Rehab/SNF/etc)   Type of Post Acute Facility Services Skilled nursing   Expected Discharge Disposition SNF   Does the patient need discharge transport arranged? Yes   RoundTrip coordination needed? Yes   Has discharge transport been arranged? No   Financial Resource Strain   How hard is it for you to pay for the very basics like food, housing, medical care, and heating? Not hard   Housing Stability   In the last 12 months, was there a time when you were not able to pay the mortgage or rent on time? N   In the past 12 months, how many times have you moved where you were living? 0   At any time in the past 12 months, were you homeless or living in a shelter (including now)? N   Transportation Needs   In the past 12 months, has lack of transportation kept you from medical appointments or from getting medications? no   In the past 12 months, has lack of transportation kept you from meetings, work, or from getting things needed for daily living? No     [facility] sent additional notes to SNFs for acceptance. Patient has medicare and will not need precert. [facility] will continue to follow patient for discharge needs.  Mae Womack RN [facility]

## 2024-12-08 NOTE — PROGRESS NOTES
"Elsa Castellano is a 73 y.o. female on day 5 of admission presenting with Adenocarcinoma determined by biopsy of bile duct (Multi) s/p robotic to open partial splenectomy.     Subjective   No acute events overnight. Patient was seen and assessed at bedside this AM. She reports poor sleep last night from stress/anxiety and persistent mild SOB. Otherwise, patient had no acute complaints. LUE swelling has improved. Denies pain, weakness, numbness. Denies nausea/vomiting or fever/chills. Patient is burping and passing gas but no BM. Patient is able to get up and walk to the bathroom door and sit in a chair.        Objective   Physical Exam  Gen:                 NAD, non-toxic appearing  Eyes:                 No scleral icterus  Card:                Regular rate  Resp:                Non-labored breathing on RA  Abd:                 Soft, moderately tender along incision, mildly tender on light palpation in RUQ, non-distended  Ext:                   WWP, no swelling of BLE  MSK:                HINSON  Psych:               Appropriate mood and affect    Last Recorded Vitals  Blood pressure 137/76, pulse 76, temperature 36.5 °C (97.7 °F), temperature source Temporal, resp. rate 18, height 1.702 m (5' 7\"), weight 90.3 kg (199 lb 1.2 oz), SpO2 94%.    Intake/Output last 3 Shifts:  I/O last 3 completed shifts:  In: - (0 mL/kg)   Out: 3480 (38.5 mL/kg) [Urine:3200 (1 mL/kg/hr); Drains:280]  Weight: 90.3 kg     Relevant Results  Results for orders placed or performed during the hospital encounter of 12/03/24 (from the past 24 hours)   CBC   Result Value Ref Range    WBC 9.6 4.4 - 11.3 x10*3/uL    nRBC 0.2 (H) 0.0 - 0.0 /100 WBCs    RBC 3.28 (L) 4.00 - 5.20 x10*6/uL    Hemoglobin 10.4 (L) 12.0 - 16.0 g/dL    Hematocrit 31.5 (L) 36.0 - 46.0 %    MCV 96 80 - 100 fL    MCH 31.7 26.0 - 34.0 pg    MCHC 33.0 32.0 - 36.0 g/dL    RDW 14.4 11.5 - 14.5 %    Platelets 143 (L) 150 - 450 x10*3/uL   CBC   Result Value Ref Range    WBC 9.3 4.4 - " 11.3 x10*3/uL    nRBC 0.2 (H) 0.0 - 0.0 /100 WBCs    RBC 3.57 (L) 4.00 - 5.20 x10*6/uL    Hemoglobin 11.0 (L) 12.0 - 16.0 g/dL    Hematocrit 32.6 (L) 36.0 - 46.0 %    MCV 91 80 - 100 fL    MCH 30.8 26.0 - 34.0 pg    MCHC 33.7 32.0 - 36.0 g/dL    RDW 14.3 11.5 - 14.5 %    Platelets 149 (L) 150 - 450 x10*3/uL   Comprehensive Metabolic Panel   Result Value Ref Range    Glucose 106 (H) 74 - 99 mg/dL    Sodium 138 136 - 145 mmol/L    Potassium 3.4 (L) 3.5 - 5.3 mmol/L    Chloride 107 98 - 107 mmol/L    Bicarbonate 22 21 - 32 mmol/L    Anion Gap 12 10 - 20 mmol/L    Urea Nitrogen 8 6 - 23 mg/dL    Creatinine 0.64 0.50 - 1.05 mg/dL    eGFR >90 >60 mL/min/1.73m*2    Calcium 7.9 (L) 8.6 - 10.6 mg/dL    Albumin 3.0 (L) 3.4 - 5.0 g/dL    Alkaline Phosphatase 54 33 - 136 U/L    Total Protein 4.9 (L) 6.4 - 8.2 g/dL    AST 37 9 - 39 U/L    Bilirubin, Total 0.8 0.0 - 1.2 mg/dL    ALT 74 (H) 7 - 45 U/L   Magnesium   Result Value Ref Range    Magnesium 2.02 1.60 - 2.40 mg/dL          Assessment/Plan   74 yo F with COPD, anxiety, prior MI, HTN, and pre-DM with liver adenocarcinoma now s/p robotic converted to open partial hepatectomy (segments 2 and 3) on 12/3. Post-op course notable for oliguric NIDHI, hyperkalemia, and poorly controlled pain which has all resolved.    PLAN   - Continue with PO pain medication (PO Tylenol, IV Dilaudid, Oxycodone PRN, Tramadol PRN)   - Incentive spirometer, continue on RA   - Continue with home meds (Metoprolol, Lipitor, Aspirin, Amlodipine)   - Continue on tele   - Regular diet, soft   - Strict I/O  - Trend AM Cr and K  - 20 mg IV Lasix today; tolerated well yesterday with 3L UOP  - Daily LFTs and coagulation panels  - Hypoglycemia protocol  - Encourage walking, PT follow up   - DVT prophylaxis: SCDs, Lovenox  - Discharge planning. Anticipate SNF dispo, will have PT re-eval.    Cherelle Medley, MS3   Discussed with Dr. Burton     I have seen and discussed the patient's interval history, physical and labs  with the medical student, and evaluated the patient with the remainder of the surgical team and agree with the findings and plan as documented above.    Francisco Chaudhry MD  PGY-2 General Surgery  Surgical Oncology [id number]

## 2024-12-08 NOTE — CARE PLAN
Problem: Pain - Adult  Goal: Verbalizes/displays adequate comfort level or baseline comfort level  Outcome: Progressing     Problem: Safety - Adult  Goal: Free from fall injury  Outcome: Progressing     Problem: Discharge Planning  Goal: Discharge to home or other facility with appropriate resources  Outcome: Progressing     Problem: Chronic Conditions and Co-morbidities  Goal: Patient's chronic conditions and co-morbidity symptoms are monitored and maintained or improved  Outcome: Progressing   The patient's goals for the shift include      The clinical goals for the shift include pain control

## 2024-12-09 ENCOUNTER — OFFICE VISIT (OUTPATIENT)
Dept: SURGICAL ONCOLOGY | Facility: HOSPITAL | Age: 74
DRG: 406 | End: 2024-12-09
Payer: MEDICARE

## 2024-12-09 ENCOUNTER — DOCUMENTATION (OUTPATIENT)
Dept: HOME HEALTH SERVICES | Facility: HOME HEALTH | Age: 74
End: 2024-12-09
Payer: COMMERCIAL

## 2024-12-09 LAB
ALBUMIN SERPL BCP-MCNC: 3.2 G/DL (ref 3.4–5)
ALP SERPL-CCNC: 55 U/L (ref 33–136)
ALT SERPL W P-5'-P-CCNC: 55 U/L (ref 7–45)
ANION GAP SERPL CALC-SCNC: 14 MMOL/L (ref 10–20)
AST SERPL W P-5'-P-CCNC: 31 U/L (ref 9–39)
ATRIAL RATE: 77 BPM
BILIRUB SERPL-MCNC: 0.7 MG/DL (ref 0–1.2)
BUN SERPL-MCNC: 9 MG/DL (ref 6–23)
CALCIUM SERPL-MCNC: 8.4 MG/DL (ref 8.6–10.6)
CHLORIDE SERPL-SCNC: 107 MMOL/L (ref 98–107)
CO2 SERPL-SCNC: 21 MMOL/L (ref 21–32)
CREAT SERPL-MCNC: 0.65 MG/DL (ref 0.5–1.05)
EGFRCR SERPLBLD CKD-EPI 2021: >90 ML/MIN/1.73M*2
ERYTHROCYTE [DISTWIDTH] IN BLOOD BY AUTOMATED COUNT: 14.4 % (ref 11.5–14.5)
GLUCOSE SERPL-MCNC: 118 MG/DL (ref 74–99)
HCT VFR BLD AUTO: 30.9 % (ref 36–46)
HGB BLD-MCNC: 10.6 G/DL (ref 12–16)
MAGNESIUM SERPL-MCNC: 1.92 MG/DL (ref 1.6–2.4)
MCH RBC QN AUTO: 31.3 PG (ref 26–34)
MCHC RBC AUTO-ENTMCNC: 34.3 G/DL (ref 32–36)
MCV RBC AUTO: 91 FL (ref 80–100)
NRBC BLD-RTO: 0 /100 WBCS (ref 0–0)
P AXIS: 37 DEGREES
P OFFSET: 207 MS
P ONSET: 138 MS
PLATELET # BLD AUTO: 174 X10*3/UL (ref 150–450)
POTASSIUM SERPL-SCNC: 3.1 MMOL/L (ref 3.5–5.3)
PR INTERVAL: 162 MS
PROT SERPL-MCNC: 5.7 G/DL (ref 6.4–8.2)
Q ONSET: 219 MS
QRS COUNT: 12 BEATS
QRS DURATION: 74 MS
QT INTERVAL: 410 MS
QTC CALCULATION(BAZETT): 463 MS
QTC FREDERICIA: 445 MS
R AXIS: 48 DEGREES
RBC # BLD AUTO: 3.39 X10*6/UL (ref 4–5.2)
SODIUM SERPL-SCNC: 139 MMOL/L (ref 136–145)
T AXIS: 1 DEGREES
T OFFSET: 424 MS
VENTRICULAR RATE: 77 BPM
WBC # BLD AUTO: 10 X10*3/UL (ref 4.4–11.3)

## 2024-12-09 PROCEDURE — 83735 ASSAY OF MAGNESIUM: CPT

## 2024-12-09 PROCEDURE — 97530 THERAPEUTIC ACTIVITIES: CPT | Mod: GP,CQ

## 2024-12-09 PROCEDURE — 2500000002 HC RX 250 W HCPCS SELF ADMINISTERED DRUGS (ALT 637 FOR MEDICARE OP, ALT 636 FOR OP/ED): Performed by: STUDENT IN AN ORGANIZED HEALTH CARE EDUCATION/TRAINING PROGRAM

## 2024-12-09 PROCEDURE — 94640 AIRWAY INHALATION TREATMENT: CPT

## 2024-12-09 PROCEDURE — 93005 ELECTROCARDIOGRAM TRACING: CPT

## 2024-12-09 PROCEDURE — 1200000003 HC ONCOLOGY  ROOM WITH TELEMETRY DAILY

## 2024-12-09 PROCEDURE — 85027 COMPLETE CBC AUTOMATED: CPT

## 2024-12-09 PROCEDURE — 2500000005 HC RX 250 GENERAL PHARMACY W/O HCPCS

## 2024-12-09 PROCEDURE — 2500000002 HC RX 250 W HCPCS SELF ADMINISTERED DRUGS (ALT 637 FOR MEDICARE OP, ALT 636 FOR OP/ED): Performed by: SURGERY

## 2024-12-09 PROCEDURE — 97116 GAIT TRAINING THERAPY: CPT | Mod: GP,CQ

## 2024-12-09 PROCEDURE — 2500000004 HC RX 250 GENERAL PHARMACY W/ HCPCS (ALT 636 FOR OP/ED): Performed by: STUDENT IN AN ORGANIZED HEALTH CARE EDUCATION/TRAINING PROGRAM

## 2024-12-09 PROCEDURE — 2500000004 HC RX 250 GENERAL PHARMACY W/ HCPCS (ALT 636 FOR OP/ED)

## 2024-12-09 PROCEDURE — 36415 COLL VENOUS BLD VENIPUNCTURE: CPT

## 2024-12-09 PROCEDURE — 2500000001 HC RX 250 WO HCPCS SELF ADMINISTERED DRUGS (ALT 637 FOR MEDICARE OP): Performed by: STUDENT IN AN ORGANIZED HEALTH CARE EDUCATION/TRAINING PROGRAM

## 2024-12-09 PROCEDURE — 80053 COMPREHEN METABOLIC PANEL: CPT

## 2024-12-09 PROCEDURE — 2500000001 HC RX 250 WO HCPCS SELF ADMINISTERED DRUGS (ALT 637 FOR MEDICARE OP)

## 2024-12-09 RX ORDER — POTASSIUM CHLORIDE 20 MEQ/1
40 TABLET, EXTENDED RELEASE ORAL ONCE
Status: COMPLETED | OUTPATIENT
Start: 2024-12-09 | End: 2024-12-09

## 2024-12-09 RX ORDER — TALC
3 POWDER (GRAM) TOPICAL NIGHTLY PRN
Status: DISCONTINUED | OUTPATIENT
Start: 2024-12-09 | End: 2024-12-10 | Stop reason: HOSPADM

## 2024-12-09 ASSESSMENT — PAIN DESCRIPTION - LOCATION
LOCATION: ABDOMEN
LOCATION: ABDOMEN

## 2024-12-09 ASSESSMENT — COGNITIVE AND FUNCTIONAL STATUS - GENERAL
MOVING FROM LYING ON BACK TO SITTING ON SIDE OF FLAT BED WITH BEDRAILS: A LITTLE
TOILETING: A LITTLE
TURNING FROM BACK TO SIDE WHILE IN FLAT BAD: A LITTLE
DAILY ACTIVITIY SCORE: 20
MOVING TO AND FROM BED TO CHAIR: A LITTLE
STANDING UP FROM CHAIR USING ARMS: A LITTLE
TOILETING: A LITTLE
PERSONAL GROOMING: A LITTLE
HELP NEEDED FOR BATHING: A LITTLE
DRESSING REGULAR LOWER BODY CLOTHING: A LITTLE
MOVING TO AND FROM BED TO CHAIR: A LITTLE
CLIMB 3 TO 5 STEPS WITH RAILING: TOTAL
HELP NEEDED FOR BATHING: A LITTLE
CLIMB 3 TO 5 STEPS WITH RAILING: A LOT
DRESSING REGULAR LOWER BODY CLOTHING: A LITTLE
STANDING UP FROM CHAIR USING ARMS: A LITTLE
TURNING FROM BACK TO SIDE WHILE IN FLAT BAD: A LOT
CLIMB 3 TO 5 STEPS WITH RAILING: A LITTLE
MOVING TO AND FROM BED TO CHAIR: A LITTLE
DAILY ACTIVITIY SCORE: 19
MOBILITY SCORE: 18
MOBILITY SCORE: 19
STANDING UP FROM CHAIR USING ARMS: A LITTLE
DRESSING REGULAR UPPER BODY CLOTHING: A LITTLE
WALKING IN HOSPITAL ROOM: A LITTLE
WALKING IN HOSPITAL ROOM: A LITTLE
TURNING FROM BACK TO SIDE WHILE IN FLAT BAD: A LITTLE
MOBILITY SCORE: 15
WALKING IN HOSPITAL ROOM: A LITTLE
DRESSING REGULAR UPPER BODY CLOTHING: A LITTLE

## 2024-12-09 ASSESSMENT — PAIN SCALES - GENERAL
PAINLEVEL_OUTOF10: 6
PAINLEVEL_OUTOF10: 8
PAINLEVEL_OUTOF10: 0 - NO PAIN
PAINLEVEL_OUTOF10: 8
PAINLEVEL_OUTOF10: 0 - NO PAIN
PAINLEVEL_OUTOF10: 5 - MODERATE PAIN
PAINLEVEL_OUTOF10: 3
PAINLEVEL_OUTOF10: 0 - NO PAIN

## 2024-12-09 ASSESSMENT — ACTIVITIES OF DAILY LIVING (ADL): LACK_OF_TRANSPORTATION: NO

## 2024-12-09 ASSESSMENT — PAIN SCALES - PAIN ASSESSMENT IN ADVANCED DEMENTIA (PAINAD): TOTALSCORE: MEDICATION (SEE MAR)

## 2024-12-09 ASSESSMENT — PAIN - FUNCTIONAL ASSESSMENT
PAIN_FUNCTIONAL_ASSESSMENT: 0-10
PAIN_FUNCTIONAL_ASSESSMENT: 0-10

## 2024-12-09 NOTE — PROGRESS NOTES
[facility]  DEPARTMENT OF SURGERY    PROGRESS NOTE  SURGICAL ONCOLOGY    SUBJECTIVE  No acute events overnight. Had a difficult time falling asleep. No headache, nausea, vomiting, or chest pain.    OBJECTIVE  Vitals:  Temp:  [36.5 °C (97.7 °F)-36.8 °C (98.2 °F)] 36.5 °C (97.7 °F)  Heart Rate:  [72-85] 72  Resp:  [18] 18  BP: (120-149)/(71-80) 149/80    I/Os:  I/O last 3 completed shifts:  In: 60 (0.7 mL/kg) [P.O.:60]  Out: 4175 (46.2 mL/kg) [Urine:4175 (1.3 mL/kg/hr)]  Weight: 90.3 kg     Exam:  Gen:  NAD, non-toxic appearing  Eyes:  No scleral icterus  Card:  Regular rate  Resp:  Non-labored breathing on RA  Abd:  Soft, moderately tender along incision, non-distended    L abdominal drain with serosanguinous output  Ext:  WWP, no swelling of BLE  MSK:  HINSON  Psych:  Appropriate mood and affect    Labs:  CBC:   Lab Results   Component Value Date    WBC 10.0 12/09/2024    RBC 3.39 (L) 12/09/2024     BMP:   Lab Results   Component Value Date    GLUCOSE 118 (H) 12/09/2024    CO2 21 12/09/2024    BUN 9 12/09/2024    CREATININE 0.65 12/09/2024    CALCIUM 8.4 (L) 12/09/2024       Imaging:  None new    ASSESSMENT:  74 yo F with COPD, anxiety, prior MI, HTN, and pre-DM with liver adenocarcinoma now s/p robotic converted to open partial hepatectomy (segments 2 and 3) on 12/3. Post-op course notable for oliguric NIDHI which has resolved. Now meeting all milestones and pending reevaluation by PT.    PLAN:  - multimodal PO pain regimen  - incentive spirometer, wean O2 to RA, SpO2 >88%  - home metoprolol tartrate, lipitor, amlodipine, and aspirin  - maintain telemetry  - Regular diet, HLIV  - strict I/O  - daily LFTs and coag panels  - hypoglycemia protocol  - PT, OOB  - SCDs, lovenox ppx  - plan for discharge to SNF with lovenox ppx for a total of 28 days since surgery    d/w Dr. Josephine Gibbs MD  General Surgery PGY5  Surgical Oncology - [facility] [id number]

## 2024-12-09 NOTE — PROGRESS NOTES
Physical Therapy    Physical Therapy Treatment    Patient Name: Elsa Castellano  MRN: [id number]  Department: [facility]  Room: [facility]6004-  Today's Date: 12/9/2024  Time Calculation  Start Time: 1535  Stop Time: 1600  Time Calculation (min): 25 min         Assessment/Plan   PT Assessment  End of Session Communication: Bedside nurse  Assessment Comment: .  End of Session Patient Position: Bed, 3 rail up, Alarm off, not on at start of session     PT Plan  Treatment/Interventions: Bed mobility, Transfer training, Gait training, Stair training, Therapeutic activity  PT Plan: Ongoing PT  PT Frequency: 3 times per week  PT Discharge Recommendations: Moderate intensity level of continued care  PT Recommended Transfer Status: Assist x2  PT - OK to Discharge: Yes (Pt eval completed; d/c rec assessed)      General Visit Information:   PT  Visit  PT Received On: 12/09/24  General  Prior to Session Communication: Bedside nurse  Patient Position Received: Up in chair, Alarm off, not on at start of session  General Comment: RN cleared pt PT; pt receptive to therapy.  pt demosntrates increased ambulation tolerance and is now able to ambualte 50' in hallway prior to fatigue. pt demonstrates increased understanding of log roll technique    Subjective   Precautions:  Precautions  Medical Precautions: Fall precautions  Post-Surgical Precautions: Abdominal surgery precautions            Objective   Pain:  Pain Assessment  0-10 (Numeric) Pain Score: 0 - No pain  Cognition:  Cognition  Orientation Level: Oriented X4       Treatments:       Bed Mobility 1  Bed Mobility 1: Sitting to supine  Level of Assistance 1: Minimum assistance  Bed Mobility Comments 1: cues for log roll    Ambulation/Gait Training 1  Surface 1: Level tile  Device 1: Rolling walker  Assistance 1: Minimum assistance  Quality of Gait 1: Decreased step length, Shuffling gait  Comments/Distance (ft) 1: 50', cues for walker safety  Transfer 1  Technique 1: Sit to stand, Stand to  sit  Transfer Device 1: Walker  Transfer Level of Assistance 1: Minimum assistance  Trials/Comments 1: cues for weight shifting    Outcome Measures:  [facility] Basic Mobility  Turning from your back to your side while in a flat bed without using bedrails: A little  Moving from lying on your back to sitting on the side of a flat bed without using bedrails: A lot  Moving to and from bed to chair (including a wheelchair): A little  Standing up from a chair using your arms (e.g. wheelchair or bedside chair): A little  To walk in hospital room: A little  Climbing 3-5 steps with railing: Total  Basic Mobility - Total Score: 15    Education Documentation  Body Mechanics, taught by Edison Alejo PTA at 12/9/2024  5:51 PM.  Learner: Patient  Readiness: Acceptance  Method: Explanation  Response: Verbalizes Understanding    Education Comments  No comments found.        OP EDUCATION:       Encounter Problems       Encounter Problems (Active)       Mobility       LTG - Patient will navigate 2 steps with LRAD with CGA  (Progressing)       Start:  12/04/24    Expected End:  12/25/24            STG - Patient will ambulate >150ft with LRAD utilizing SBA (Progressing)       Start:  12/04/24    Expected End:  12/25/24               PT Transfers       STG - Patient will perform bed mobility with SBA (Progressing)       Start:  12/04/24    Expected End:  12/25/24            STG - Patient will transfer sit to and from stand with LRAD with SBA  (Progressing)       Start:  12/04/24    Expected End:  12/25/24               Pain - Adult

## 2024-12-09 NOTE — CARE PLAN
The patient's goals for the shift include      The clinical goals for the shift include Patient will report pain  <5/10 this shift till 12/9/27 till 0700      Problem: Pain - Adult  Goal: Verbalizes/displays adequate comfort level or baseline comfort level  Outcome: Progressing     Problem: Safety - Adult  Goal: Free from fall injury  Outcome: Progressing     Problem: Discharge Planning  Goal: Discharge to home or other facility with appropriate resources  Outcome: Progressing     Problem: Chronic Conditions and Co-morbidities  Goal: Patient's chronic conditions and co-morbidity symptoms are monitored and maintained or improved  Outcome: Progressing

## 2024-12-09 NOTE — PROGRESS NOTES
12/09/24 0900   Discharge Planning   Living Arrangements Spouse/significant other   Support Systems Spouse/significant other   Type of Residence Private residence   Number of Stairs to Enter Residence 12   Do you have animals or pets at home? No   Who is requesting discharge planning? Provider   Home or Post Acute Services Post acute facilities (Rehab/SNF/etc)   Type of Post Acute Facility Services Skilled nursing   Expected Discharge Disposition SNF   Does the patient need discharge transport arranged? Yes   RoundTrip coordination needed? Yes   Has discharge transport been arranged? No   Financial Resource Strain   How hard is it for you to pay for the very basics like food, housing, medical care, and heating? Not hard   Housing Stability   In the last 12 months, was there a time when you were not able to pay the mortgage or rent on time? N   In the past 12 months, how many times have you moved where you were living? 0   At any time in the past 12 months, were you homeless or living in a shelter (including now)? N   Transportation Needs   In the past 12 months, has lack of transportation kept you from medical appointments or from getting medications? no   In the past 12 months, has lack of transportation kept you from meetings, work, or from getting things needed for daily living? No     Pt has been accepted at [facility].  SW will assist in transfer when medically stable.  Will follow.  BEKAH Jimenez

## 2024-12-09 NOTE — CONSULTS
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)    Wound Assessment:  Wound 12/03/24 Incision Abdomen Mid (Active)   Site Assessment Clean;Dry;Intact 12/09/24 0819   Alejandrina-Wound Assessment Intact 12/09/24 0819   Closure Approximated;Glue;Open to air 12/09/24 0045   Sutures/Staple Line Approximated 12/08/24 2230   Drainage Description None 12/09/24 0819   Drainage Amount None 12/09/24 0819   Dressing Open to air 12/09/24 0819   Dressing Status Clean;Dry 12/06/24 2251       Wound 12/07/24 Buttock Right (Active)   Wound Image   12/09/24 1326   Wound Length (cm) 4 cm 12/09/24 1326   Wound Width (cm) 3.5 cm 12/09/24 1326   Wound Surface Area (cm^2) 14 cm^2 12/09/24 1326   Margins Poorly defined 12/09/24 1326   Drainage Description Serosanguineous 12/09/24 1326   Drainage Amount Scant 12/09/24 1326   Dressing Barrier film;Silicone border dressing 12/09/24 1326   Dressing Changed New 12/09/24 1326   Dressing Status Clean;Dry 12/09/24 1326      Buttocks    Wound Team Summary Assessment: Pt resting in bed with daughter at bedside. Alert, oriented but painful 2/2 ABD surgery site. Turned to reveal 4cm x 3.5cm peeling/sloughing deep tissue injury (DTI) to R medial buttock, with mostly blanchable erythema contralaterally. Scant SS drainage. Cleansed, prepped and redressed using Mepilex foam to cover both sides of fragile buttocks. Pt shown photo of wound. Discussed wound etiology and importance of aggressive offloading. Pt assisted to bedside commode.      Wound Team Recs: Aggressive turning q2 hours side-to-side to offload sacrum/buttocks. Move pt carefully in bed to minimize friction over fragile skin. OOB to chair and to ambulate as often as possible. Skin prep and Mepilex foam to buttocks wound. Change q3 days and PRN when  soiled. Optimize nutrition.    Provider, please review and sign saved wound care orders accordingly.      Trish Ceballos RN, CWON  Wound/Ostomy Nurse  12/9/2024  1:27 PM

## 2024-12-10 VITALS
RESPIRATION RATE: 18 BRPM | DIASTOLIC BLOOD PRESSURE: 79 MMHG | TEMPERATURE: 97.7 F | SYSTOLIC BLOOD PRESSURE: 135 MMHG | BODY MASS INDEX: 31.25 KG/M2 | HEIGHT: 67 IN | OXYGEN SATURATION: 95 % | HEART RATE: 73 BPM | WEIGHT: 199.08 LBS

## 2024-12-10 PROBLEM — I21.4 NON-ST ELEVATION MYOCARDIAL INFARCTION (NSTEMI) (MULTI): Status: RESOLVED | Noted: 2024-12-02 | Resolved: 2024-12-10

## 2024-12-10 PROCEDURE — 94640 AIRWAY INHALATION TREATMENT: CPT

## 2024-12-10 PROCEDURE — 2500000004 HC RX 250 GENERAL PHARMACY W/ HCPCS (ALT 636 FOR OP/ED)

## 2024-12-10 PROCEDURE — 2500000004 HC RX 250 GENERAL PHARMACY W/ HCPCS (ALT 636 FOR OP/ED): Performed by: STUDENT IN AN ORGANIZED HEALTH CARE EDUCATION/TRAINING PROGRAM

## 2024-12-10 PROCEDURE — 2500000005 HC RX 250 GENERAL PHARMACY W/O HCPCS: Performed by: STUDENT IN AN ORGANIZED HEALTH CARE EDUCATION/TRAINING PROGRAM

## 2024-12-10 PROCEDURE — 2500000001 HC RX 250 WO HCPCS SELF ADMINISTERED DRUGS (ALT 637 FOR MEDICARE OP): Performed by: STUDENT IN AN ORGANIZED HEALTH CARE EDUCATION/TRAINING PROGRAM

## 2024-12-10 PROCEDURE — 2500000001 HC RX 250 WO HCPCS SELF ADMINISTERED DRUGS (ALT 637 FOR MEDICARE OP)

## 2024-12-10 RX ORDER — POLYETHYLENE GLYCOL 3350 17 G/17G
17 POWDER, FOR SOLUTION ORAL DAILY
Qty: 510 G | Refills: 0 | Status: SHIPPED | OUTPATIENT
Start: 2024-12-10

## 2024-12-10 RX ORDER — POLYETHYLENE GLYCOL 3350 17 G/17G
17 POWDER, FOR SOLUTION ORAL DAILY
Status: DISCONTINUED | OUTPATIENT
Start: 2024-12-10 | End: 2024-12-10 | Stop reason: HOSPADM

## 2024-12-10 RX ORDER — ENOXAPARIN SODIUM 100 MG/ML
40 INJECTION SUBCUTANEOUS DAILY
Qty: 8 ML | Refills: 0 | Status: SHIPPED | OUTPATIENT
Start: 2024-12-10 | End: 2024-12-30

## 2024-12-10 ASSESSMENT — PAIN SCALES - GENERAL
PAINLEVEL_OUTOF10: 0 - NO PAIN
PAINLEVEL_OUTOF10: 0 - NO PAIN
PAINLEVEL_OUTOF10: 3

## 2024-12-10 ASSESSMENT — COGNITIVE AND FUNCTIONAL STATUS - GENERAL
TURNING FROM BACK TO SIDE WHILE IN FLAT BAD: A LITTLE
MOVING FROM LYING ON BACK TO SITTING ON SIDE OF FLAT BED WITH BEDRAILS: A LITTLE
MOVING TO AND FROM BED TO CHAIR: A LITTLE
DRESSING REGULAR UPPER BODY CLOTHING: A LITTLE
CLIMB 3 TO 5 STEPS WITH RAILING: A LITTLE
TOILETING: A LITTLE
DRESSING REGULAR LOWER BODY CLOTHING: A LITTLE
PERSONAL GROOMING: A LITTLE
HELP NEEDED FOR BATHING: A LITTLE
MOBILITY SCORE: 18
WALKING IN HOSPITAL ROOM: A LITTLE
STANDING UP FROM CHAIR USING ARMS: A LITTLE
DAILY ACTIVITIY SCORE: 19

## 2024-12-10 ASSESSMENT — ACTIVITIES OF DAILY LIVING (ADL): LACK_OF_TRANSPORTATION: NO

## 2024-12-10 ASSESSMENT — PAIN - FUNCTIONAL ASSESSMENT: PAIN_FUNCTIONAL_ASSESSMENT: 0-10

## 2024-12-10 ASSESSMENT — PAIN DESCRIPTION - LOCATION: LOCATION: ABDOMEN

## 2024-12-10 NOTE — NURSING NOTE
Transport arrived to take patient to [facility]. IV's were removed. Report was called to [facility] and [facility]. Patient belongings sent with patient.

## 2024-12-10 NOTE — PROGRESS NOTES
"Elsa Castellano is a 73 y.o. 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     Subjective   No acute events overnight. Patient was seen and assessed at bedside this AM. She reports poor sleep last night from stress/anxiety and persistent mild SOB. She also reports sharp pain at the left incision site. She denies nausea/vomiting or fever/chills. Patient is burping and passing gas but no BM. Patient is able to get up, walk, and sit in a chair.     Objective   Physical Exam  Gen:                 NAD, non-toxic appearing  Eyes:                 No scleral icterus  Card:                Regular rate  Resp:                Non-labored breathing on RA  Abd:                 Soft, non distended. Mildly tender on light palpation at L incision site. Incision sites dry clean intact   Ext:                   WWP, no swelling of BLE  MSK:                HINSON  Psych:               Appropriate mood and affect    Last Recorded Vitals  Blood pressure 135/79, pulse 61, temperature 36.5 °C (97.7 °F), temperature source Temporal, resp. rate 18, height 1.702 m (5' 7\"), weight 90.3 kg (199 lb 1.2 oz), SpO2 95%.  Intake/Output last 3 Shifts:  I/O last 3 completed shifts:  In: 620 (6.9 mL/kg) [P.O.:620]  Out: 2000 (22.1 mL/kg) [Urine:2000 (0.6 mL/kg/hr)]  Weight: 90.3 kg     Relevant Results  Results for orders placed or performed during the hospital encounter of 12/03/24 (from the past 24 hours)   Electrocardiogram, 12-lead PRN ACS symptoms   Result Value Ref Range    Ventricular Rate 82 BPM    Atrial Rate 82 BPM    MN Interval 144 ms    QRS Duration 84 ms    QT Interval 370 ms    QTC Calculation(Bazett) 432 ms    P Axis 30 degrees    R Axis 13 degrees    T Axis -17 degrees    QRS Count 14 beats    Q Onset 218 ms    P Onset 146 ms    P Offset 200 ms    T Offset 403 ms    QTC Fredericia 410 ms   Electrocardiogram, 12-lead PRN ACS symptoms   Result " Value Ref Range    Ventricular Rate 77 BPM    Atrial Rate 77 BPM    UT Interval 162 ms    QRS Duration 74 ms    QT Interval 410 ms    QTC Calculation(Bazett) 463 ms    P Axis 37 degrees    R Axis 48 degrees    T Axis 1 degrees    QRS Count 12 beats    Q Onset 219 ms    P Onset 138 ms    P Offset 207 ms    T Offset 424 ms    QTC Fredericia 445 ms        Assessment/Plan   74 yo F with COPD, anxiety, prior MI, HTN, and pre-DM with liver adenocarcinoma now s/p robotic converted to open partial hepatectomy (segments 2 and 3) on 12/3. Post-op course notable for oliguric NIDHI, hyperkalemia, and poorly controlled pain which has all resolved.     PLAN   - Continue with PO pain medication (PO Tylenol, IV Dilaudid, Oxycodone PRN, Tramadol PRN)   - Encourage incentive spirometer  - Continue with home meds (Metoprolol, Lipitor, Aspirin, Amlodipine)   - Continue on tele   - Regular diet, soft   - Miralax for BM  - Trend AM Cr and K  - Hypoglycemia protocol  - Encourage walking as tolerated  - DVT prophylaxis: SCDs, Lovenox  - Melatonin 3mg for sleep   - Anticipate SNF dispo today at 11 am    Vanessa Medley,MS3     I have seen and discussed the patient's interval history, physical and labs with the medical student, and evaluated the patient with the remainder of the surgical team and agree with the findings and plan as documented above.     Alex Novoa MD  General Surgery Resident  [facility] [id number]

## 2024-12-10 NOTE — PROGRESS NOTES
"   12/10/24 0800   Discharge Planning   Living Arrangements Spouse/significant other   Support Systems Spouse/significant other   Type of Residence Private residence   Number of Stairs to Enter Residence 12   Do you have animals or pets at home? No   Who is requesting discharge planning? Provider   Home or Post Acute Services Post acute facilities (Rehab/SNF/etc)   Type of Post Acute Facility Services Skilled nursing   Expected Discharge Disposition SNF   Does the patient need discharge transport arranged? Yes   RoundTrip coordination needed? Yes   Has discharge transport been arranged? Yes   What day is the transport expected? 12/10/24   What time is the transport expected? 1100   Financial Resource Strain   How hard is it for you to pay for the very basics like food, housing, medical care, and heating? Not hard   Housing Stability   In the last 12 months, was there a time when you were not able to pay the mortgage or rent on time? N   In the past 12 months, how many times have you moved where you were living? 0   At any time in the past 12 months, were you homeless or living in a shelter (including now)? N   Transportation Needs   In the past 12 months, has lack of transportation kept you from medical appointments or from getting medications? no   In the past 12 months, has lack of transportation kept you from meetings, work, or from getting things needed for daily living? No     Discharge Transfer to Facility  Pt will discharge today to:  SNF  Facility name:  [facility]  Facility phone number:  [phone]  Unit secretary aware:  Yes  Bedside nurse (Maria Elena) aware to call report\"  Yes  Phone number for report:  [phone]  Ambulance transport has been arranged:  Yes  Ambulance company:  [facility]  Ambulance phone number:  [phone]   time:  11am  Pt and family aware  BEKAH Jimenez  "

## 2024-12-10 NOTE — CARE PLAN
The patient's goals for the shift include      The clinical goals for the shift include Patient will get 5 consecutive hours of sleep this shift        Problem: Pain - Adult  Goal: Verbalizes/displays adequate comfort level or baseline comfort level  Outcome: Progressing     Problem: Safety - Adult  Goal: Free from fall injury  Outcome: Progressing     Problem: Discharge Planning  Goal: Discharge to home or other facility with appropriate resources  Outcome: Progressing     Problem: Chronic Conditions and Co-morbidities  Goal: Patient's chronic conditions and co-morbidity symptoms are monitored and maintained or improved  Outcome: Progressing

## 2024-12-10 NOTE — CARE PLAN
The patient's goals for the shift include  to get sleep    The clinical goals for the shift include Patient will get 5 consecutive hours of sleep this shift    Patient continued to have difficulty sleeping and feel restless, provider aware. Patient VS stable. Pt had abnormal run on the telemetry monitor. Provider notified. EKG performed NSR with low voltage QRS T wave abnormal. Patient has not had a BM since admission, provider aware and will discuss with day team      Problem: Pain - Adult  Goal: Verbalizes/displays adequate comfort level or baseline comfort level  Outcome: Progressing     Problem: Safety - Adult  Goal: Free from fall injury  Outcome: Progressing     Problem: Discharge Planning  Goal: Discharge to home or other facility with appropriate resources  Outcome: Progressing     Problem: Chronic Conditions and Co-morbidities  Goal: Patient's chronic conditions and co-morbidity symptoms are monitored and maintained or improved  Outcome: Progressing

## 2024-12-10 NOTE — DISCHARGE SUMMARY
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 She tolerated the procedure well. Her postoperative course was significant for oligouric NIDHI requiring fluid resuscitation, hyperkalemia requiring insulin, and left upper extremity swelling requiring Lasix diuresis. Jensen was removed POD2 and passed TOV. Surgical drain was removed POD 5. Diet advanced slowly as tolerated. Pain was initially controlled on IV pain medication and transitioned to PO. Exercise tolerance progressed slowly as tolerated. She will be transferred to [facility] on POD7.     At the time of discharge on 12/10/24, pain was controlled. She was voiding spontaneously, ambulating appropriating, and tolerating a full diet. She was deemed appropriate for discharge and has an outpatient follow up with Dr. Burton on 12/18.     Pertinent Physical Exam At Time of Discharge  Physical Exam  Gen:                 NAD, non-toxic appearing  Eyes:                 No scleral icterus  Card:                Regular rate  Resp:                Non-labored breathing on RA  Abd:                 Soft, non distended. Mildly tender on light palpation at L incision site. Incision sites dry clean intact   Ext:                   WWP, no swelling of BLE  MSK:                HINSON  Psych:               Appropriate mood and affect     Home Medications     Medication List      START taking these medications     enoxaparin 40 mg/0.4 mL syringe; Commonly known as: Lovenox; Inject 0.4   mL (40 mg) under the skin once daily for 20 days.   polyethylene glycol 17  gram/dose powder; Commonly known as: Glycolax,   Miralax; Mix 17 g of powder and drink once daily.   traMADol 50 mg tablet; Commonly known as: Ultram; Take 1 tablet (50 mg)   by mouth every 8 hours if needed for severe pain (7 - 10) for up to 7   days.     CHANGE how you take these medications     Mounjaro 5 mg/0.5 mL pen injector; Generic drug: tirzepatide; 5 mg by   abdominal subcutaneous route 1 (one) time per week.; What changed:   additional instructions     CONTINUE taking these medications     Adult Low Dose Aspirin 81 mg EC tablet; Generic drug: aspirin   amLODIPine 10 mg tablet; Commonly known as: Norvasc; Take 1 tablet (10   mg) by mouth once daily.   atorvastatin 40 mg tablet; Commonly known as: Lipitor; Take 1 tablet (40   mg) by mouth once daily.   Incruse Ellipta 62.5 mcg/actuation inhalation; Generic drug:   umeclidinium   metoprolol succinate XL 50 mg 24 hr tablet; Commonly known as:   Toprol-XL; Take 1 tablet (50 mg) by mouth once daily.   omeprazole 20 mg DR capsule; Commonly known as: PriLOSEC   Proventil HFA 90 mcg/actuation inhaler; Generic drug: albuterol       Outpatient Follow-Up  Future Appointments   Date Time Provider Department Center   12/18/2024  2:00 PM [id number]   1/8/2025  8:30 AM Gilbert Velazco MD [id number] [facility]   1/21/2025  9:00 AM Ady Bedolla MD [id number]       Alex Novoa MD

## 2024-12-11 ENCOUNTER — NURSING HOME VISIT (OUTPATIENT)
Dept: POST ACUTE CARE | Facility: EXTERNAL LOCATION | Age: 74
End: 2024-12-11
Payer: MEDICARE

## 2024-12-11 DIAGNOSIS — R73.03 PRE-DIABETES: ICD-10-CM

## 2024-12-11 DIAGNOSIS — Z09 STATUS POST ABDOMINAL SURGERY, FOLLOW-UP EXAM: ICD-10-CM

## 2024-12-11 DIAGNOSIS — I21.9 ACUTE MYOCARDIAL INFARCTION, UNSPECIFIED MI TYPE, UNSPECIFIED ARTERY (MULTI): ICD-10-CM

## 2024-12-11 DIAGNOSIS — I10 HYPERTENSION, UNSPECIFIED TYPE: ICD-10-CM

## 2024-12-11 DIAGNOSIS — R53.1 WEAKNESS: ICD-10-CM

## 2024-12-11 DIAGNOSIS — Z91.81 AT RISK FOR FALLING: ICD-10-CM

## 2024-12-11 DIAGNOSIS — E78.5 HYPERLIPIDEMIA, UNSPECIFIED HYPERLIPIDEMIA TYPE: ICD-10-CM

## 2024-12-11 DIAGNOSIS — J44.9 CHRONIC OBSTRUCTIVE PULMONARY DISEASE, UNSPECIFIED COPD TYPE (MULTI): ICD-10-CM

## 2024-12-11 DIAGNOSIS — C22.9 HEPATIC ADENOCARCINOMA (MULTI): Primary | ICD-10-CM

## 2024-12-11 DIAGNOSIS — F41.9 ANXIETY: ICD-10-CM

## 2024-12-11 PROCEDURE — 99306 1ST NF CARE HIGH MDM 50: CPT | Performed by: INTERNAL MEDICINE

## 2024-12-11 NOTE — LETTER
Patient: Elsa Castellano  : 1950    Encounter Date: 2024    PLACE OF SERVICE:  [facility]    This is new/initial history and physical.    Subjective  Patient ID: Elsa Castellano is a 73 y.o. female who presents for Initial visit.    Ms. Lily Castellano is a 73-year-old female with history of hepatic adenocarcinoma.  She did undergo partial hepatectomy with pleural lymphadenectomy.  She is unable to care for herself and requires supportive care.    Review of Systems   Constitutional:  Negative for chills and fever.   Cardiovascular:  Negative for chest pain.   All other systems reviewed and are negative.    Objective  /82   Pulse 84   Temp 36.8 °C (98.2 °F)   Resp 18     Physical Exam  Vitals reviewed.   Constitutional:       Comments: This is a well-developed, well-nourished female, lying in bed, appearing weak and lethargic.   HENT:      Right Ear: Tympanic membrane, ear canal and external ear normal.      Left Ear: Tympanic membrane, ear canal and external ear normal.   Eyes:      General: No scleral icterus.     Pupils: Pupils are equal, round, and reactive to light.   Neck:      Vascular: No carotid bruit.   Cardiovascular:      Heart sounds: Normal heart sounds, S1 normal and S2 normal. No murmur heard.     No friction rub.   Pulmonary:      Effort: Pulmonary effort is normal.      Breath sounds: Decreased breath sounds (throughout.) present.   Abdominal:      Palpations: There is no hepatomegaly, splenomegaly or mass.      Comments: Abdomen shows obesity.  Surgical incision is intact with no sign of infection.   Musculoskeletal:         General: No swelling or deformity. Normal range of motion.      Cervical back: Neck supple.      Right lower leg: No edema.      Left lower leg: No edema.   Lymphadenopathy:      Cervical: No cervical adenopathy.      Upper Body:      Right upper body: No axillary adenopathy.      Left upper body: No axillary adenopathy.      Lower  Body: No right inguinal adenopathy. No left inguinal adenopathy.   Neurological:      Mental Status: She is oriented to person, place, and time.      Cranial Nerves: Cranial nerves 2-12 are intact. No cranial nerve deficit.      Sensory: No sensory deficit.      Motor: Motor function is intact. No weakness.      Gait: Gait is intact.      Deep Tendon Reflexes: Reflexes normal.   Psychiatric:         Mood and Affect: Mood normal. Mood is not anxious or depressed. Affect is not angry.         Behavior: Behavior is not agitated.         Thought Content: Thought content normal.         Judgment: Judgment normal.     LAB WORK:  Laboratory studies were reviewed.    Assessment/Plan  Problem List Items Addressed This Visit             ICD-10-CM       Cardiac and Vasculature    Hyperlipidemia E78.5       Mental Health    Anxiety F41.9       Pulmonary and Pneumonias    Chronic obstructive pulmonary disease (Multi) J44.9     Other Visit Diagnoses         Codes    Hepatic adenocarcinoma (Multi)    -  Primary C22.9    Status post abdominal surgery, follow-up exam     Z09    Hypertension, unspecified type     I10    Acute myocardial infarction, unspecified MI type, unspecified artery (Multi)     I21.9    Pre-diabetes     R73.03    Weakness     R53.1    At risk for falling     Z91.81        1. Hepatic adenocarcinoma.  Follow with Oncology.  2. Status post abdominal surgery.  Continue wound care.  3. COPD, on bronchodilator therapy.  4. Hypertension, medically controlled.  5. History of AMI, on aspirin.  6. Pre-diabetes.  Monitor hemoglobin A1c.  7. Hyperlipidemia, on statin.  8. Anxiety, on medication.  9. Weakness, on PT/OT.  10. Fall risk, on fall precautions.    Scribe Attestation  By signing my name below, IDiane Scribe attest that this documentation has been prepared under the direction and in the presence of Nimco Brooke MD.     All medical record entries made by the scribe were personally dictated by me I have  reviewed the chart and agree the record accurately reflects my personal performance of his history physical examination and management      Electronically Signed By: Nimco Brooke MD   12/16/24 10:02 PM

## 2024-12-13 LAB
ATRIAL RATE: 82 BPM
P AXIS: 30 DEGREES
P OFFSET: 200 MS
P ONSET: 146 MS
PR INTERVAL: 144 MS
Q ONSET: 218 MS
QRS COUNT: 14 BEATS
QRS DURATION: 84 MS
QT INTERVAL: 370 MS
QTC CALCULATION(BAZETT): 432 MS
QTC FREDERICIA: 410 MS
R AXIS: 13 DEGREES
T AXIS: -17 DEGREES
T OFFSET: 403 MS
VENTRICULAR RATE: 82 BPM

## 2024-12-16 VITALS
TEMPERATURE: 98.2 F | DIASTOLIC BLOOD PRESSURE: 82 MMHG | HEART RATE: 84 BPM | SYSTOLIC BLOOD PRESSURE: 130 MMHG | RESPIRATION RATE: 18 BRPM

## 2024-12-16 LAB
ATRIAL RATE: 77 BPM
ATRIAL RATE: 93 BPM
P AXIS: 37 DEGREES
P OFFSET: 189 MS
P OFFSET: 207 MS
P ONSET: 138 MS
P ONSET: 141 MS
PR INTERVAL: 140 MS
PR INTERVAL: 162 MS
Q ONSET: 211 MS
Q ONSET: 219 MS
QRS COUNT: 12 BEATS
QRS COUNT: 15 BEATS
QRS DURATION: 74 MS
QRS DURATION: 76 MS
QT INTERVAL: 362 MS
QT INTERVAL: 410 MS
QTC CALCULATION(BAZETT): 450 MS
QTC CALCULATION(BAZETT): 463 MS
QTC FREDERICIA: 419 MS
QTC FREDERICIA: 445 MS
R AXIS: 154 DEGREES
R AXIS: 48 DEGREES
T AXIS: 1 DEGREES
T AXIS: 133 DEGREES
T OFFSET: 392 MS
T OFFSET: 424 MS
VENTRICULAR RATE: 77 BPM
VENTRICULAR RATE: 93 BPM

## 2024-12-16 ASSESSMENT — ENCOUNTER SYMPTOMS
FEVER: 0
CHILLS: 0

## 2024-12-16 NOTE — PROGRESS NOTES
PLACE OF SERVICE:  [facility]    This is new/initial history and physical.    Subjective   Patient ID: Elsa Castellano is a 73 y.o. female who presents for Initial visit.    Ms. Lily Castellano is a 73-year-old female with history of hepatic adenocarcinoma.  She did undergo partial hepatectomy with pleural lymphadenectomy.  She is unable to care for herself and requires supportive care.    Review of Systems   Constitutional:  Negative for chills and fever.   Cardiovascular:  Negative for chest pain.   All other systems reviewed and are negative.    Objective   /82   Pulse 84   Temp 36.8 °C (98.2 °F)   Resp 18     Physical Exam  Vitals reviewed.   Constitutional:       Comments: This is a well-developed, well-nourished female, lying in bed, appearing weak and lethargic.   HENT:      Right Ear: Tympanic membrane, ear canal and external ear normal.      Left Ear: Tympanic membrane, ear canal and external ear normal.   Eyes:      General: No scleral icterus.     Pupils: Pupils are equal, round, and reactive to light.   Neck:      Vascular: No carotid bruit.   Cardiovascular:      Heart sounds: Normal heart sounds, S1 normal and S2 normal. No murmur heard.     No friction rub.   Pulmonary:      Effort: Pulmonary effort is normal.      Breath sounds: Decreased breath sounds (throughout.) present.   Abdominal:      Palpations: There is no hepatomegaly, splenomegaly or mass.      Comments: Abdomen shows obesity.  Surgical incision is intact with no sign of infection.   Musculoskeletal:         General: No swelling or deformity. Normal range of motion.      Cervical back: Neck supple.      Right lower leg: No edema.      Left lower leg: No edema.   Lymphadenopathy:      Cervical: No cervical adenopathy.      Upper Body:      Right upper body: No axillary adenopathy.      Left upper body: No axillary adenopathy.      Lower Body: No right inguinal adenopathy. No left inguinal adenopathy.    Neurological:      Mental Status: She is oriented to person, place, and time.      Cranial Nerves: Cranial nerves 2-12 are intact. No cranial nerve deficit.      Sensory: No sensory deficit.      Motor: Motor function is intact. No weakness.      Gait: Gait is intact.      Deep Tendon Reflexes: Reflexes normal.   Psychiatric:         Mood and Affect: Mood normal. Mood is not anxious or depressed. Affect is not angry.         Behavior: Behavior is not agitated.         Thought Content: Thought content normal.         Judgment: Judgment normal.     LAB WORK:  Laboratory studies were reviewed.    Assessment/Plan   Problem List Items Addressed This Visit             ICD-10-CM       Cardiac and Vasculature    Hyperlipidemia E78.5       Mental Health    Anxiety F41.9       Pulmonary and Pneumonias    Chronic obstructive pulmonary disease (Multi) J44.9     Other Visit Diagnoses         Codes    Hepatic adenocarcinoma (Multi)    -  Primary C22.9    Status post abdominal surgery, follow-up exam     Z09    Hypertension, unspecified type     I10    Acute myocardial infarction, unspecified MI type, unspecified artery (Multi)     I21.9    Pre-diabetes     R73.03    Weakness     R53.1    At risk for falling     Z91.81        1. Hepatic adenocarcinoma.  Follow with Oncology.  2. Status post abdominal surgery.  Continue wound care.  3. COPD, on bronchodilator therapy.  4. Hypertension, medically controlled.  5. History of AMI, on aspirin.  6. Pre-diabetes.  Monitor hemoglobin A1c.  7. Hyperlipidemia, on statin.  8. Anxiety, on medication.  9. Weakness, on PT/OT.  10. Fall risk, on fall precautions.    Scribe Attestation  By signing my name below, IDiane Scribe attest that this documentation has been prepared under the direction and in the presence of Nimco Brooke MD.     All medical record entries made by the scribe were personally dictated by me I have reviewed the chart and agree the record accurately reflects my  personal performance of his history physical examination and management

## 2024-12-18 ENCOUNTER — APPOINTMENT (OUTPATIENT)
Dept: SURGICAL ONCOLOGY | Facility: HOSPITAL | Age: 74
End: 2024-12-18
Payer: MEDICARE

## 2024-12-19 ENCOUNTER — APPOINTMENT (OUTPATIENT)
Dept: SURGICAL ONCOLOGY | Facility: CLINIC | Age: 74
End: 2024-12-19
Payer: MEDICARE

## 2024-12-19 LAB
LAB AP BLOCK FOR ADDITIONAL STUDIES: NORMAL
LABORATORY COMMENT REPORT: NORMAL
PATH REPORT.FINAL DX SPEC: NORMAL
PATH REPORT.GROSS SPEC: NORMAL
PATH REPORT.RELEVANT HX SPEC: NORMAL
PATH REPORT.TOTAL CANCER: NORMAL
PATHOLOGY SYNOPTIC REPORT: NORMAL

## 2024-12-20 ENCOUNTER — NURSING HOME VISIT (OUTPATIENT)
Dept: POST ACUTE CARE | Facility: EXTERNAL LOCATION | Age: 74
End: 2024-12-20
Payer: MEDICARE

## 2024-12-20 DIAGNOSIS — R53.1 WEAKNESS: ICD-10-CM

## 2024-12-20 DIAGNOSIS — C22.9 HEPATIC ADENOCARCINOMA (MULTI): ICD-10-CM

## 2024-12-20 DIAGNOSIS — Z09 STATUS POST ABDOMINAL SURGERY, FOLLOW-UP EXAM: Primary | ICD-10-CM

## 2024-12-20 DIAGNOSIS — F41.9 ANXIETY: ICD-10-CM

## 2024-12-20 DIAGNOSIS — Z91.81 AT RISK FOR FALLING: ICD-10-CM

## 2024-12-20 DIAGNOSIS — I21.9 ACUTE MYOCARDIAL INFARCTION, UNSPECIFIED MI TYPE, UNSPECIFIED ARTERY (MULTI): ICD-10-CM

## 2024-12-20 DIAGNOSIS — J44.9 CHRONIC OBSTRUCTIVE PULMONARY DISEASE, UNSPECIFIED COPD TYPE (MULTI): ICD-10-CM

## 2024-12-20 DIAGNOSIS — R73.03 PRE-DIABETES: ICD-10-CM

## 2024-12-20 DIAGNOSIS — I10 HYPERTENSION, UNSPECIFIED TYPE: ICD-10-CM

## 2024-12-20 DIAGNOSIS — E78.5 HYPERLIPIDEMIA, UNSPECIFIED HYPERLIPIDEMIA TYPE: ICD-10-CM

## 2024-12-20 PROCEDURE — 99309 SBSQ NF CARE MODERATE MDM 30: CPT | Performed by: INTERNAL MEDICINE

## 2024-12-20 NOTE — LETTER
Patient: Elsa Castellano  : 1950    Encounter Date: 2024    PLACE OF SERVICE:  [facility]    This is a subsequent visit.    Subjective  Patient ID: Elsa Castellano is a 73 y.o. female who presents for Follow-up.    Ms. Lily Castellano is a 73-year-old female with history of COPD.  She suffers from hepatic carcinoma and status post abdominal surgery with partial hepatectomy.  She is unable to care for herself and requires supportive care.    Review of Systems   Constitutional:  Negative for chills and fever.   Cardiovascular:  Negative for chest pain.   All other systems reviewed and are negative.    Objective  /78   Pulse 78   Temp 36.7 °C (98.1 °F)   Resp 18     Physical Exam  Vitals reviewed.   Constitutional:       Comments: This is a well-developed, well-nourished female, lying in bed, appearing weak.   HENT:      Right Ear: Tympanic membrane, ear canal and external ear normal.      Left Ear: Tympanic membrane, ear canal and external ear normal.   Eyes:      General: No scleral icterus.     Pupils: Pupils are equal, round, and reactive to light.   Neck:      Vascular: No carotid bruit.   Cardiovascular:      Heart sounds: Normal heart sounds, S1 normal and S2 normal. No murmur heard.     No friction rub.   Pulmonary:      Breath sounds: Decreased breath sounds (throughout) present.   Abdominal:      Palpations: There is no hepatomegaly, splenomegaly or mass.   Musculoskeletal:         General: No swelling or deformity. Normal range of motion.      Cervical back: Neck supple.      Right lower leg: No edema.      Left lower leg: No edema.   Lymphadenopathy:      Cervical: No cervical adenopathy.      Upper Body:      Right upper body: No axillary adenopathy.      Left upper body: No axillary adenopathy.      Lower Body: No right inguinal adenopathy. No left inguinal adenopathy.   Skin:     Findings: Wound (abdominal, intact with no sign of infection) present.    Neurological:      Mental Status: She is oriented to person, place, and time. She is lethargic.      Cranial Nerves: Cranial nerves 2-12 are intact. No cranial nerve deficit.      Sensory: No sensory deficit.      Motor: Motor function is intact. No weakness.      Gait: Gait is intact.      Deep Tendon Reflexes: Reflexes normal.   Psychiatric:         Mood and Affect: Mood normal. Mood is not anxious or depressed. Affect is not angry.         Behavior: Behavior is not agitated.         Thought Content: Thought content normal.         Judgment: Judgment normal.     LAB WORK:  Laboratory studies were reviewed.    Assessment/Plan  Problem List Items Addressed This Visit             ICD-10-CM       Cardiac and Vasculature    Hyperlipidemia E78.5       Mental Health    Anxiety F41.9       Pulmonary and Pneumonias    Chronic obstructive pulmonary disease (Multi) J44.9     Other Visit Diagnoses         Codes    Status post abdominal surgery, follow-up exam    -  Primary Z09    Hepatic adenocarcinoma (Multi)     C22.9    Acute myocardial infarction, unspecified MI type, unspecified artery (Multi)     I21.9    Hypertension, unspecified type     I10    Pre-diabetes     R73.03    Weakness     R53.1    At risk for falling     Z91.81        1. Status post abdominal surgery.  Continue wound care.  2. Hepatic adenocarcinoma.  Follow with Oncology.  3. COPD, on bronchodilator therapy.  4. Recent AMI, on aspirin.  5. Hypertension, medically controlled.  6. Anxiety, on medication.  7. Hyperlipidemia, on statin.  8. Pre-diabetes.  Follow hemoglobin A1c.  9. Weakness, on PT/OT.  10. Fall risk, on fall precautions.    Scribe Attestation  By signing my name below, IDiane Scribe attest that this documentation has been prepared under the direction and in the presence of Nimco Brooke MD.     All medical record entries made by the scribe were personally dictated by me I have reviewed the chart and agree the record accurately  reflects my personal performance of his history physical examination and management      Electronically Signed By: Nimco Brooke MD   12/26/24 10:56 AM

## 2024-12-21 VITALS
RESPIRATION RATE: 18 BRPM | SYSTOLIC BLOOD PRESSURE: 130 MMHG | HEART RATE: 78 BPM | DIASTOLIC BLOOD PRESSURE: 78 MMHG | TEMPERATURE: 98.1 F

## 2024-12-21 ASSESSMENT — ENCOUNTER SYMPTOMS
CHILLS: 0
FEVER: 0

## 2024-12-21 NOTE — PROGRESS NOTES
PLACE OF SERVICE:  [facility]    This is a subsequent visit.    Subjective   Patient ID: Elsa Castellano is a 73 y.o. female who presents for Follow-up.    Ms. Lily Castellano is a 73-year-old female with history of COPD.  She suffers from hepatic carcinoma and status post abdominal surgery with partial hepatectomy.  She is unable to care for herself and requires supportive care.    Review of Systems   Constitutional:  Negative for chills and fever.   Cardiovascular:  Negative for chest pain.   All other systems reviewed and are negative.    Objective   /78   Pulse 78   Temp 36.7 °C (98.1 °F)   Resp 18     Physical Exam  Vitals reviewed.   Constitutional:       Comments: This is a well-developed, well-nourished female, lying in bed, appearing weak.   HENT:      Right Ear: Tympanic membrane, ear canal and external ear normal.      Left Ear: Tympanic membrane, ear canal and external ear normal.   Eyes:      General: No scleral icterus.     Pupils: Pupils are equal, round, and reactive to light.   Neck:      Vascular: No carotid bruit.   Cardiovascular:      Heart sounds: Normal heart sounds, S1 normal and S2 normal. No murmur heard.     No friction rub.   Pulmonary:      Breath sounds: Decreased breath sounds (throughout) present.   Abdominal:      Palpations: There is no hepatomegaly, splenomegaly or mass.   Musculoskeletal:         General: No swelling or deformity. Normal range of motion.      Cervical back: Neck supple.      Right lower leg: No edema.      Left lower leg: No edema.   Lymphadenopathy:      Cervical: No cervical adenopathy.      Upper Body:      Right upper body: No axillary adenopathy.      Left upper body: No axillary adenopathy.      Lower Body: No right inguinal adenopathy. No left inguinal adenopathy.   Skin:     Findings: Wound (abdominal, intact with no sign of infection) present.   Neurological:      Mental Status: She is oriented to person, place, and time.  Patient presents with:  Consult: cervical       She is lethargic.      Cranial Nerves: Cranial nerves 2-12 are intact. No cranial nerve deficit.      Sensory: No sensory deficit.      Motor: Motor function is intact. No weakness.      Gait: Gait is intact.      Deep Tendon Reflexes: Reflexes normal.   Psychiatric:         Mood and Affect: Mood normal. Mood is not anxious or depressed. Affect is not angry.         Behavior: Behavior is not agitated.         Thought Content: Thought content normal.         Judgment: Judgment normal.     LAB WORK:  Laboratory studies were reviewed.    Assessment/Plan   Problem List Items Addressed This Visit             ICD-10-CM       Cardiac and Vasculature    Hyperlipidemia E78.5       Mental Health    Anxiety F41.9       Pulmonary and Pneumonias    Chronic obstructive pulmonary disease (Multi) J44.9     Other Visit Diagnoses         Codes    Status post abdominal surgery, follow-up exam    -  Primary Z09    Hepatic adenocarcinoma (Multi)     C22.9    Acute myocardial infarction, unspecified MI type, unspecified artery (Multi)     I21.9    Hypertension, unspecified type     I10    Pre-diabetes     R73.03    Weakness     R53.1    At risk for falling     Z91.81        1. Status post abdominal surgery.  Continue wound care.  2. Hepatic adenocarcinoma.  Follow with Oncology.  3. COPD, on bronchodilator therapy.  4. Recent AMI, on aspirin.  5. Hypertension, medically controlled.  6. Anxiety, on medication.  7. Hyperlipidemia, on statin.  8. Pre-diabetes.  Follow hemoglobin A1c.  9. Weakness, on PT/OT.  10. Fall risk, on fall precautions.    Scribe Attestation  By signing my name below, Diane HUANG Scribe attest that this documentation has been prepared under the direction and in the presence of Nimco Brooke MD.     All medical record entries made by the scribe were personally dictated by me I have reviewed the chart and agree the record accurately reflects my personal performance of his history physical examination and  management

## 2024-12-31 ENCOUNTER — LAB (OUTPATIENT)
Dept: LAB | Facility: LAB | Age: 74
End: 2024-12-31
Payer: COMMERCIAL

## 2024-12-31 DIAGNOSIS — R73.03 PREDIABETES: ICD-10-CM

## 2024-12-31 DIAGNOSIS — E78.2 MIXED HYPERLIPIDEMIA: ICD-10-CM

## 2024-12-31 DIAGNOSIS — Z01.89 ENCOUNTER FOR ROUTINE LABORATORY TESTING: ICD-10-CM

## 2024-12-31 DIAGNOSIS — Z86.79 HISTORY OF HYPERTENSION: ICD-10-CM

## 2024-12-31 LAB
ALBUMIN SERPL BCP-MCNC: 3.9 G/DL (ref 3.4–5)
ALP SERPL-CCNC: 58 U/L (ref 33–136)
ALT SERPL W P-5'-P-CCNC: 11 U/L (ref 7–45)
ANION GAP SERPL CALCULATED.3IONS-SCNC: 14 MMOL/L (ref 10–20)
AST SERPL W P-5'-P-CCNC: 15 U/L (ref 9–39)
BASOPHILS # BLD AUTO: 0.09 X10*3/UL (ref 0–0.1)
BASOPHILS NFR BLD AUTO: 0.8 %
BILIRUB SERPL-MCNC: 0.5 MG/DL (ref 0–1.2)
BUN SERPL-MCNC: 15 MG/DL (ref 6–23)
CALCIUM SERPL-MCNC: 9.1 MG/DL (ref 8.6–10.3)
CHLORIDE SERPL-SCNC: 105 MMOL/L (ref 98–107)
CHOLEST SERPL-MCNC: 146 MG/DL (ref 0–199)
CHOLEST/HDLC SERPL: 4.3 {RATIO}
CO2 SERPL-SCNC: 19 MMOL/L (ref 21–32)
CREAT SERPL-MCNC: 0.86 MG/DL (ref 0.5–1.05)
EGFRCR SERPLBLD CKD-EPI 2021: 71 ML/MIN/1.73M*2
EOSINOPHIL # BLD AUTO: 0.24 X10*3/UL (ref 0–0.4)
EOSINOPHIL NFR BLD AUTO: 2.1 %
ERYTHROCYTE [DISTWIDTH] IN BLOOD BY AUTOMATED COUNT: 13.5 % (ref 11.5–14.5)
EST. AVERAGE GLUCOSE BLD GHB EST-MCNC: 105 MG/DL
GLUCOSE SERPL-MCNC: 126 MG/DL (ref 74–99)
HBA1C MFR BLD: 5.3 %
HCT VFR BLD AUTO: 36.2 % (ref 36–46)
HDLC SERPL-MCNC: 33.9 MG/DL
HGB BLD-MCNC: 11.5 G/DL (ref 12–16)
IMM GRANULOCYTES # BLD AUTO: 0.06 X10*3/UL (ref 0–0.5)
IMM GRANULOCYTES NFR BLD AUTO: 0.5 % (ref 0–0.9)
LDLC SERPL CALC-MCNC: 89 MG/DL
LYMPHOCYTES # BLD AUTO: 1.93 X10*3/UL (ref 0.8–3)
LYMPHOCYTES NFR BLD AUTO: 16.6 %
MCH RBC QN AUTO: 29.6 PG (ref 26–34)
MCHC RBC AUTO-ENTMCNC: 31.8 G/DL (ref 32–36)
MCV RBC AUTO: 93 FL (ref 80–100)
MONOCYTES # BLD AUTO: 1.11 X10*3/UL (ref 0.05–0.8)
MONOCYTES NFR BLD AUTO: 9.5 %
NEUTROPHILS # BLD AUTO: 8.22 X10*3/UL (ref 1.6–5.5)
NEUTROPHILS NFR BLD AUTO: 70.5 %
NON HDL CHOLESTEROL: 112 MG/DL (ref 0–149)
NRBC BLD-RTO: 0 /100 WBCS (ref 0–0)
PLATELET # BLD AUTO: 298 X10*3/UL (ref 150–450)
POTASSIUM SERPL-SCNC: 4 MMOL/L (ref 3.5–5.3)
PROT SERPL-MCNC: 6.5 G/DL (ref 6.4–8.2)
RBC # BLD AUTO: 3.89 X10*6/UL (ref 4–5.2)
SODIUM SERPL-SCNC: 134 MMOL/L (ref 136–145)
TRIGL SERPL-MCNC: 115 MG/DL (ref 0–149)
VLDL: 23 MG/DL (ref 0–40)
WBC # BLD AUTO: 11.7 X10*3/UL (ref 4.4–11.3)

## 2024-12-31 PROCEDURE — 85025 COMPLETE CBC W/AUTO DIFF WBC: CPT

## 2024-12-31 PROCEDURE — 80061 LIPID PANEL: CPT

## 2024-12-31 PROCEDURE — 83036 HEMOGLOBIN GLYCOSYLATED A1C: CPT

## 2024-12-31 PROCEDURE — 80053 COMPREHEN METABOLIC PANEL: CPT

## 2025-01-08 ENCOUNTER — OFFICE VISIT (OUTPATIENT)
Dept: SURGICAL ONCOLOGY | Facility: HOSPITAL | Age: 75
End: 2025-01-08
Payer: MEDICARE

## 2025-01-08 ENCOUNTER — OFFICE VISIT (OUTPATIENT)
Dept: PRIMARY CARE | Facility: CLINIC | Age: 75
End: 2025-01-08
Payer: MEDICARE

## 2025-01-08 VITALS
DIASTOLIC BLOOD PRESSURE: 70 MMHG | BODY MASS INDEX: 29.35 KG/M2 | OXYGEN SATURATION: 99 % | HEART RATE: 99 BPM | SYSTOLIC BLOOD PRESSURE: 120 MMHG | WEIGHT: 187 LBS | HEIGHT: 67 IN | TEMPERATURE: 97.2 F

## 2025-01-08 VITALS
OXYGEN SATURATION: 99 % | TEMPERATURE: 96.1 F | RESPIRATION RATE: 16 BRPM | DIASTOLIC BLOOD PRESSURE: 61 MMHG | HEART RATE: 99 BPM | WEIGHT: 188.9 LBS | BODY MASS INDEX: 29.59 KG/M2 | SYSTOLIC BLOOD PRESSURE: 121 MMHG

## 2025-01-08 DIAGNOSIS — I10 PRIMARY HYPERTENSION: ICD-10-CM

## 2025-01-08 DIAGNOSIS — E78.49 OTHER HYPERLIPIDEMIA: ICD-10-CM

## 2025-01-08 DIAGNOSIS — F41.9 ANXIETY: ICD-10-CM

## 2025-01-08 DIAGNOSIS — Z86.79 HISTORY OF HYPERTENSION: ICD-10-CM

## 2025-01-08 DIAGNOSIS — D64.9 ANEMIA, UNSPECIFIED TYPE: ICD-10-CM

## 2025-01-08 DIAGNOSIS — Z00.00 ANNUAL PHYSICAL EXAM: Primary | ICD-10-CM

## 2025-01-08 DIAGNOSIS — C24.0: ICD-10-CM

## 2025-01-08 DIAGNOSIS — I25.10 CORONARY ARTERY DISEASE INVOLVING NATIVE CORONARY ARTERY OF NATIVE HEART WITHOUT ANGINA PECTORIS: ICD-10-CM

## 2025-01-08 DIAGNOSIS — J44.9 CHRONIC OBSTRUCTIVE PULMONARY DISEASE, UNSPECIFIED COPD TYPE (MULTI): ICD-10-CM

## 2025-01-08 DIAGNOSIS — E78.2 MIXED HYPERLIPIDEMIA: ICD-10-CM

## 2025-01-08 DIAGNOSIS — Z12.31 OTHER SCREENING MAMMOGRAM: ICD-10-CM

## 2025-01-08 DIAGNOSIS — R73.03 PREDIABETES: ICD-10-CM

## 2025-01-08 DIAGNOSIS — R73.9 HYPERGLYCEMIA: ICD-10-CM

## 2025-01-08 PROCEDURE — 3074F SYST BP LT 130 MM HG: CPT | Performed by: INTERNAL MEDICINE

## 2025-01-08 PROCEDURE — G0439 PPPS, SUBSEQ VISIT: HCPCS | Performed by: INTERNAL MEDICINE

## 2025-01-08 PROCEDURE — 1159F MED LIST DOCD IN RCRD: CPT | Performed by: INTERNAL MEDICINE

## 2025-01-08 PROCEDURE — 1125F AMNT PAIN NOTED PAIN PRSNT: CPT | Performed by: INTERNAL MEDICINE

## 2025-01-08 PROCEDURE — 1160F RVW MEDS BY RX/DR IN RCRD: CPT | Performed by: INTERNAL MEDICINE

## 2025-01-08 PROCEDURE — 1123F ACP DISCUSS/DSCN MKR DOCD: CPT | Performed by: INTERNAL MEDICINE

## 2025-01-08 PROCEDURE — 99215 OFFICE O/P EST HI 40 MIN: CPT | Performed by: INTERNAL MEDICINE

## 2025-01-08 PROCEDURE — 1157F ADVNC CARE PLAN IN RCRD: CPT | Performed by: INTERNAL MEDICINE

## 2025-01-08 PROCEDURE — 99213 OFFICE O/P EST LOW 20 MIN: CPT | Performed by: INTERNAL MEDICINE

## 2025-01-08 PROCEDURE — 99213 OFFICE O/P EST LOW 20 MIN: CPT | Mod: 25 | Performed by: INTERNAL MEDICINE

## 2025-01-08 PROCEDURE — 1125F AMNT PAIN NOTED PAIN PRSNT: CPT | Performed by: SURGERY

## 2025-01-08 PROCEDURE — 99211 OFF/OP EST MAY X REQ PHY/QHP: CPT | Performed by: SURGERY

## 2025-01-08 PROCEDURE — 1111F DSCHRG MED/CURRENT MED MERGE: CPT | Performed by: INTERNAL MEDICINE

## 2025-01-08 PROCEDURE — 4004F PT TOBACCO SCREEN RCVD TLK: CPT | Performed by: SURGERY

## 2025-01-08 PROCEDURE — 3078F DIAST BP <80 MM HG: CPT | Performed by: INTERNAL MEDICINE

## 2025-01-08 PROCEDURE — 1158F ADVNC CARE PLAN TLK DOCD: CPT | Performed by: INTERNAL MEDICINE

## 2025-01-08 PROCEDURE — 1111F DSCHRG MED/CURRENT MED MERGE: CPT | Performed by: SURGERY

## 2025-01-08 PROCEDURE — 1157F ADVNC CARE PLAN IN RCRD: CPT | Performed by: SURGERY

## 2025-01-08 RX ORDER — AMLODIPINE BESYLATE 10 MG/1
10 TABLET ORAL DAILY
Qty: 90 TABLET | Refills: 3 | Status: SHIPPED | OUTPATIENT
Start: 2025-01-08 | End: 2026-01-08

## 2025-01-08 RX ORDER — TIRZEPATIDE 5 MG/.5ML
5 INJECTION, SOLUTION SUBCUTANEOUS WEEKLY
Qty: 8 ML | Refills: 3 | Status: SHIPPED | OUTPATIENT
Start: 2025-01-08

## 2025-01-08 RX ORDER — TIRZEPATIDE 5 MG/.5ML
5 INJECTION, SOLUTION SUBCUTANEOUS WEEKLY
Status: SHIPPED
Start: 2025-01-08 | End: 2025-01-08 | Stop reason: SDUPTHER

## 2025-01-08 RX ORDER — ATORVASTATIN CALCIUM 40 MG/1
40 TABLET, FILM COATED ORAL DAILY
Qty: 90 TABLET | Refills: 3 | Status: SHIPPED | OUTPATIENT
Start: 2025-01-08

## 2025-01-08 ASSESSMENT — ENCOUNTER SYMPTOMS
CARDIOVASCULAR NEGATIVE: 1
MUSCULOSKELETAL NEGATIVE: 1
FATIGUE: 1
EYES NEGATIVE: 1
GASTROINTESTINAL NEGATIVE: 1
ALLERGIC/IMMUNOLOGIC NEGATIVE: 1
RESPIRATORY NEGATIVE: 1
APPETITE CHANGE: 1
ENDOCRINE NEGATIVE: 1
NEUROLOGICAL NEGATIVE: 1
PSYCHIATRIC NEGATIVE: 1
HEMATOLOGIC/LYMPHATIC NEGATIVE: 1

## 2025-01-08 ASSESSMENT — PATIENT HEALTH QUESTIONNAIRE - PHQ9
1. LITTLE INTEREST OR PLEASURE IN DOING THINGS: NOT AT ALL
2. FEELING DOWN, DEPRESSED OR HOPELESS: NOT AT ALL
SUM OF ALL RESPONSES TO PHQ9 QUESTIONS 1 AND 2: 0

## 2025-01-08 ASSESSMENT — PAIN SCALES - GENERAL
PAINLEVEL_OUTOF10: 2
PAINLEVEL_OUTOF10: 2

## 2025-01-08 NOTE — PROGRESS NOTES
[facility]: [facility]  MEDICARE WELLNESS EXAM      Elsa Castellano is a 74 y.o. female that is presenting today for Annual Exam.    Assessment/Plan    Diagnoses and all orders for this visit:  Annual physical exam  Prediabetes  -     tirzepatide (Mounjaro) 5 mg/0.5 mL pen injector; 5 mg by abdominal subcutaneous route 1 (one) time per week.  Mixed hyperlipidemia  -     Comprehensive Metabolic Panel; Future  -     Lipid Panel; Future  -     TSH with reflex to Free T4 if abnormal; Future  History of hypertension  Anxiety  Chronic obstructive pulmonary disease, unspecified COPD type (Multi)  Coronary artery disease involving native coronary artery of native heart without angina pectoris  Hyperglycemia  -     Hemoglobin A1C; Future  Primary hypertension  -     amLODIPine (Norvasc) 10 mg tablet; Take 1 tablet (10 mg) by mouth once daily.  Other hyperlipidemia  -     atorvastatin (Lipitor) 40 mg tablet; Take 1 tablet (40 mg) by mouth once daily.  Other screening mammogram  -     BI mammo bilateral screening tomosynthesis; Future  Anemia, unspecified type  -     CBC and Auto Differential; Future  Other orders  -     Follow Up In Primary Care - Medicare Annual  -     Follow Up In Primary Care - Established; Future  We completed the medicare wellness exam today.  The lifestyle is reviewed and recommendations for diet and exercise are made. We reviewed the immunizations and cancer screening and recommendations for needed immunizations and screenings are made.  The patient is independent in all ADL, shows no clinical signs of cognitive impairment, and is not falling or at risk for such.     Sees Josephine Rich, Mike    In terms of her annual wellness visit elements there are few important things to note:  1.  Advanced directives-she does have a power of  for healthcare and has ACP documents on file.  2.  Cancer screenings-her last mammogram was in March 2024 will be due after March  2025 order is plced  3.  Immunizations-she already had her flu shot this season she had Pneumovax in 2022 she had Tdap in 2022.  She had the old Zostavax in 2010 but never had Shingrix I discussed that she should get those she also is a candidate for RSV vaccine and have recommended that.    We also took some time to evaluate and manage her chronic medical problems and to review her recent blood work that was done.  Here a few of those assessments:  1.  COPD-her lung seem to be stable even through this perioperative period  2.  Hypertension-stable on current medicines  3.  Hyperlipidemia-adequate values on current statin dosage  4.  Postoperative anemia-this is gradually resolving and has improved significantly already.  5.  Prediabetes-on the good side the Mounjaro has helped her lose weight and she is down 24 pounds.  Her hemoglobin A1c is 5.3%.  For some reason however her fasting blood sugar was 126 this time.  She still states best the prediabetes category and we will continue our current plan as of such.    Prescription refills will be sent and otherwise I will just plan on seeing her back in 6 months    ADVANCED CARE PLANNING  Advanced Care Planning was discussed with patient:  The patient has an active advanced care plan on file. The patient has an active surrogate decision-maker on file.  Encouraged the patient to confirm that Living Will and Healthcare Power of  (HCPoA) are accurate and up to date.  Encouraged the patient to confirm that our office be provided a copy of any documentation in the event that anything changes.    ACTIVITIES OF DAILY LIVING  Basic ADLs:  Bathing: Independent, Dressing: Independent, Toileting: Independent, Transferring: Independent, Continence: Independent, Feeding: Independent.    Instrumental ADLs:  Ability to use phone: Independent, Shopping: Independent, Cooking: Independent, House-keeping: Independent, Laundry: Independent, Transportation: Independent, Medication  Management: Independent, Finance Management: Independent.    Subjective   Patient is here for annual wellness visit and follow-up care.  Patient has been through the work since I last seen her.  Basically had an adenocarcinoma resected at  main campus and subsequently had to be in a rehab facility at [facility] for a while.  She has now transition to home and is doing okay she is ambulating with the use of a quad cane but otherwise has continued with home therapy and has made gradual progress.  She does not feel back to her normal self yet but is moving in that direction she still has some fatigue she still has some morning hoarseness which seems to go away as over the course of the day.  She does not have the best appetite as of yet.  She is also going need some prescription refills today      Review of Systems   Constitutional:  Positive for appetite change and fatigue.   HENT: Negative.     Eyes: Negative.    Respiratory: Negative.     Cardiovascular: Negative.    Gastrointestinal: Negative.    Endocrine: Negative.    Genitourinary: Negative.    Musculoskeletal: Negative.    Skin: Negative.    Allergic/Immunologic: Negative.    Neurological: Negative.    Hematological: Negative.    Psychiatric/Behavioral: Negative.     All other systems reviewed and are negative.    Objective   Vitals:    01/08/25 0838   BP: 120/70   Pulse: 99   Temp: 36.2 °C (97.2 °F)   SpO2: 99%      Body mass index is 29.29 kg/m².  Physical Exam  Vitals and nursing note reviewed.   Constitutional:       General: She is not in acute distress.     Appearance: Normal appearance. She is not ill-appearing.   HENT:      Head: Normocephalic and atraumatic.      Right Ear: Hearing normal. There is no impacted cerumen.      Left Ear: Hearing normal. There is no impacted cerumen.      Nose: Nose normal.      Mouth/Throat:      Mouth: Mucous membranes are moist.      Pharynx: Oropharynx is clear. No oropharyngeal exudate or posterior  oropharyngeal erythema.   Eyes:      General: No scleral icterus.        Right eye: No discharge.         Left eye: No discharge.      Extraocular Movements: Extraocular movements intact.      Conjunctiva/sclera: Conjunctivae normal.      Pupils: Pupils are equal, round, and reactive to light.   Neck:      Vascular: No carotid bruit.   Cardiovascular:      Rate and Rhythm: Normal rate and regular rhythm.      Pulses: Normal pulses.      Heart sounds: Normal heart sounds. No murmur heard.     No friction rub. No gallop.   Pulmonary:      Effort: Pulmonary effort is normal. No respiratory distress.      Breath sounds: Normal breath sounds.   Abdominal:      General: Abdomen is flat. Bowel sounds are normal. There is no distension.      Palpations: Abdomen is soft.      Tenderness: There is no abdominal tenderness.      Hernia: No hernia is present.      Comments: She has a midline abdominal incision that is well-healing.  She also has the stigmata of recent laparoscopy which is well-healing as well.   Musculoskeletal:         General: No swelling or tenderness. Normal range of motion.   Lymphadenopathy:      Cervical: No cervical adenopathy.   Skin:     General: Skin is warm and dry.      Capillary Refill: Capillary refill takes less than 2 seconds.      Coloration: Skin is not jaundiced.      Findings: No rash.   Neurological:      General: No focal deficit present.      Mental Status: She is alert and oriented to person, place, and time. Mental status is at baseline.   Psychiatric:         Mood and Affect: Mood normal.         Behavior: Behavior normal.       Diagnostic Results   Lab Results   Component Value Date    GLUCOSE 126 (H) 12/31/2024    CALCIUM 9.1 12/31/2024     (L) 12/31/2024    K 4.0 12/31/2024    CO2 19 (L) 12/31/2024     12/31/2024    BUN 15 12/31/2024    CREATININE 0.86 12/31/2024     Lab Results   Component Value Date    ALT 11 12/31/2024    AST 15 12/31/2024    ALKPHOS 58 12/31/2024     "BILITOT 0.5 12/31/2024     Lab Results   Component Value Date    WBC 11.7 (H) 12/31/2024    HGB 11.5 (L) 12/31/2024    HCT 36.2 12/31/2024    MCV 93 12/31/2024     12/31/2024     Lab Results   Component Value Date    CHOL 146 12/31/2024    CHOL 162 06/18/2024    CHOL 173 02/09/2024     Lab Results   Component Value Date    HDL 33.9 12/31/2024    HDL 62.0 06/18/2024    HDL 57.0 02/09/2024     Lab Results   Component Value Date    LDLCALC 89 12/31/2024    LDLCALC 80 06/18/2024    LDLCALC 82 02/09/2024     Lab Results   Component Value Date    TRIG 115 12/31/2024    TRIG 99 06/18/2024    TRIG 170 (H) 02/09/2024     No components found for: \"CHOLHDL\"  Lab Results   Component Value Date    HGBA1C 5.3 12/31/2024     Other labs not included in the list above reviewed either before or during this encounter.    History   Past Medical History:   Diagnosis Date    Acute non-ST segment elevation myocardial infarction (Multi) 08/28/2022    AMI (acute myocardial infarction) (Multi)     Anxiety 12/26/2023    At risk for falling     Chest pain 09/08/2023    Chronic obstructive pulmonary disease (Multi) 09/08/2023    Elevated liver enzymes 09/08/2023    H/O abdominal surgery     Hepatic adenocarcinoma (Multi)     Hepatomegaly 08/14/2023    History of hypertension 12/30/2023    Hyperlipidemia 09/08/2023    Hypertension     Macrocytosis without anemia 09/08/2023    Obesity     Palpitations 09/08/2023    Prediabetes 01/27/2023    Rhinitis 12/30/2023    Shortness of breath 09/08/2023    Weakness      Past Surgical History:   Procedure Laterality Date    APPENDECTOMY      CARDIAC CATHETERIZATION  2022    x2    CATARACT EXTRACTION Bilateral 2014    COLONOSCOPY  2008    COLONOSCOPY  2013    COLONOSCOPY  2018    DILATION AND CURETTAGE OF UTERUS  2012    VAGINA SURGERY  1974     Family History   Problem Relation Name Age of Onset    No Known Problems Mother      Stroke Father      Heart attack Father      No Known Problems Brother   "    Breast cancer Father's Sister      Breast cancer Paternal Grandmother      Hypertension Other      Diabetes Other       Social History     Socioeconomic History    Marital status:      Spouse name: Not on file    Number of children: Not on file    Years of education: Not on file    Highest education level: Not on file   Occupational History    Not on file   Tobacco Use    Smoking status: Every Day     Types: Cigarettes     Passive exposure: Past    Smokeless tobacco: Never   Vaping Use    Vaping status: Never Used   Substance and Sexual Activity    Alcohol use: Yes     Alcohol/week: 10.0 standard drinks of alcohol     Types: 10 Standard drinks or equivalent per week    Drug use: Never    Sexual activity: Defer   Other Topics Concern    Not on file   Social History Narrative    Not on file     Social Drivers of Health     Financial Resource Strain: Low Risk  (12/10/2024)    Overall Financial Resource Strain (CARDIA)     Difficulty of Paying Living Expenses: Not hard at all   Food Insecurity: No Food Insecurity (12/3/2024)    Hunger Vital Sign     Worried About Running Out of Food in the Last Year: Never true     Ran Out of Food in the Last Year: Never true   Transportation Needs: No Transportation Needs (12/10/2024)    PRAPARE - Transportation     Lack of Transportation (Medical): No     Lack of Transportation (Non-Medical): No   Physical Activity: Inactive (10/17/2024)    Exercise Vital Sign     Days of Exercise per Week: 0 days     Minutes of Exercise per Session: 0 min   Stress: No Stress Concern Present (10/17/2024)    [employer] [place] [employer] - Occupational Stress Questionnaire     Feeling of Stress : Not at all   Social Connections: Moderately Integrated (10/17/2024)    Social Connection and Isolation Panel [NHANES]     Frequency of Communication with Friends and Family: Three times a week     Frequency of Social Gatherings with Friends and Family: More than three times a week      Attends Christian Services: More than 4 times per year     Active Member of Clubs or Organizations: No     Attends Club or Organization Meetings: Never     Marital Status:    Intimate Partner Violence: Not At Risk (12/3/2024)    Humiliation, Afraid, Rape, and Kick questionnaire     Fear of Current or Ex-Partner: No     Emotionally Abused: No     Physically Abused: No     Sexually Abused: No   Housing Stability: Low Risk  (12/10/2024)    Housing Stability Vital Sign     Unable to Pay for Housing in the Last Year: No     Number of Times Moved in the Last Year: 0     Homeless in the Last Year: No     Allergies   Allergen Reactions    Penicillins Unknown, Anaphylaxis and Swelling    Hydrochlorothiazide Dizziness     Current Outpatient Medications on File Prior to Visit   Medication Sig Dispense Refill    albuterol (Proventil HFA) 90 mcg/actuation inhaler Inhale 2 puffs every 6 hours if needed for wheezing.      aspirin (Adult Low Dose Aspirin) 81 mg EC tablet Take 1 tablet (81 mg) by mouth once daily.      metoprolol succinate XL (Toprol-XL) 50 mg 24 hr tablet Take 1 tablet (50 mg) by mouth once daily. 90 tablet 3    umeclidinium (Incruse Ellipta) 62.5 mcg/actuation inhalation Inhale 1 puff (62.5 mcg) once daily.      [DISCONTINUED] amLODIPine (Norvasc) 10 mg tablet Take 1 tablet (10 mg) by mouth once daily. 90 tablet 3    [DISCONTINUED] atorvastatin (Lipitor) 40 mg tablet Take 1 tablet (40 mg) by mouth once daily. 90 tablet 3    [DISCONTINUED] tirzepatide (Mounjaro) 5 mg/0.5 mL pen injector 5 mg by abdominal subcutaneous route 1 (one) time per week. (Patient taking differently: 5 mg by abdominal subcutaneous route 1 (one) time per week. On Saturday) 0.5 mL 11    [DISCONTINUED] omeprazole (PriLOSEC) 20 mg DR capsule Take 1 capsule (20 mg) by mouth once daily. 30 days supply (Patient not taking: Reported on 1/8/2025)      [DISCONTINUED] polyethylene glycol (Glycolax, Miralax) 17 gram/dose powder Mix 17 g of powder  and drink once daily. (Patient not taking: Mix of powder and drink. Reported on 1/8/2025) 510 g 0     No current facility-administered medications on file prior to visit.     Immunization History   Administered Date(s) Administered    DTaP vaccine, pediatric  (INFANRIX) 08/24/2007    Flu vaccine, quadrivalent, high-dose, preservative free, age 65y+ (FLUZONE) 09/23/2020, 09/28/2021, 10/19/2022    Flu vaccine, trivalent, preservative free, HIGH-DOSE, age 65y+ (Fluzone) 11/18/2015, 10/12/2018, 12/16/2019    Influenza, Seasonal, Quadrivalent, Adjuvanted 10/24/2023    Influenza, injectable, quadrivalent 10/09/2014, 11/12/2019, 10/12/2020    Influenza, seasonal, injectable 10/15/2010, 10/28/2011, 11/30/2012, 01/03/2014, 10/05/2015, 10/17/2016    Moderna COVID-19 vaccine, bivalent, blue cap/gray label *Check age/dose* 10/03/2022    Moderna SARS-CoV-2 Booster 01/31/2024    Moderna SARS-CoV-2 Vaccination 02/02/2021, 03/01/2021, 10/28/2021    Novel influenza-H1N1-09, preservative-free 01/14/2010    PPD Test 08/26/2011    Pfizer COVID-19 vaccine, 12 years and older, (30mcg/0.3mL) (Comirnaty) 10/09/2024    Pneumococcal conjugate vaccine, 13-valent (PREVNAR 13) 01/16/2015    Pneumococcal conjugate vaccine, 20-valent (PREVNAR 20) 07/27/2022    Pneumococcal polysaccharide vaccine, 23-valent, age 2 years and older (PNEUMOVAX 23) 12/16/2019    Tdap vaccine, age 7 year and older (BOOSTRIX, ADACEL) 01/19/2022    Zoster, live 02/04/2010     Patient's medical history was reviewed and updated either before or during this encounter.     Gilbert Velazco MD

## 2025-01-08 NOTE — PROGRESS NOTES
Subjective     HPI  Elsa Castellano is a 74 y.o. female here in postoperative followup after undergoing robotic converted to open left lateral liver resection with regional lymphadenectomy on 12/3/2024.  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  She has recovered fairly well overall albeit slowly.  She has noticed some drainage from the inferior aspect of her incision and requires changing the bandage once daily.    Objective     /61 (BP Location: Left arm, Patient Position: Sitting, BP Cuff Size: Large adult)   Pulse 99   Temp 35.6 °C (96.1 °F) (Skin)   Resp 16   Wt 85.7 kg (188 lb 14.4 oz)   SpO2 99%   BMI 29.59 kg/m²      NAD  Abdomen soft with well healed incision. No infection.  At the most inferior aspect, there is some thinning but no obvious opening of the skin.  As I squeezed the area I was not able to express any additional fluid.  The bandage itself had a very small amount of fluid.      Assessment/Plan     74-year-old woman recovering fairly well overall from her recent surgery for stage Ia intrahepatic cholangiocarcinoma.  With regards to the drainage at the inferior aspect, she likely had a small seroma which has drained itself.  I advised her that if this continues to drain after 10 to 14 days to contact my office.  There is no evidence of infection.  She will be seeing her oncologist in the near future.  She can follow-up with me in the future as needed.    Gilbert Burton MD

## 2025-01-08 NOTE — PATIENT INSTRUCTIONS
We were able to do your annual wellness visit and follow-up for your chronic medical problems today.  I am glad that you are recovering from your surgery.  Here is a copy that I think you will understand of our current assessments and the content of much of our discussion:    In terms of her annual wellness visit elements there are few important things to note:  1.  Advanced directives-she does have a power of  for healthcare and has ACP documents on file.  2.  Cancer screenings-her last mammogram was in March 2024 will be due after March 2025 order is plced  3.  Immunizations-she already had her flu shot this season she had Pneumovax in 2022 she had Tdap in 2022.  She had the old Zostavax in 2010 but never had Shingrix I discussed that she should get those she also is a candidate for RSV vaccine and have recommended that.    We also took some time to evaluate and manage her chronic medical problems and to review her recent blood work that was done.  Here a few of those assessments:  1.  COPD-her lung seem to be stable even through this perioperative period  2.  Hypertension-stable on current medicines  3.  Hyperlipidemia-adequate values on current statin dosage  4.  Postoperative anemia-this is gradually resolving and has improved significantly already.  5.  Prediabetes-on the good side the Mounjaro has helped her lose weight and she is down 24 pounds.  Her hemoglobin A1c is 5.3%.  For some reason however her fasting blood sugar was 126 this time.  She still states best the prediabetes category and we will continue our current plan as of such.    Prescription refills will be sent and otherwise I will just plan on seeing her back in 6 months    I will look forward to seeing you in 6 months.  Please continue to dutifully do all your exercises given to you by physical therapy and let me know if I can help otherwise lets just continue to recover and I will see you at our regular follow-up.

## 2025-01-13 DIAGNOSIS — I10 PRIMARY HYPERTENSION: ICD-10-CM

## 2025-01-13 RX ORDER — AMLODIPINE BESYLATE 10 MG/1
10 TABLET ORAL DAILY
Qty: 90 TABLET | Refills: 3 | Status: SHIPPED | OUTPATIENT
Start: 2025-01-13

## 2025-01-20 ENCOUNTER — OFFICE VISIT (OUTPATIENT)
Dept: HEMATOLOGY/ONCOLOGY | Facility: CLINIC | Age: 75
End: 2025-01-20
Payer: MEDICARE

## 2025-01-20 VITALS
WEIGHT: 184.41 LBS | RESPIRATION RATE: 18 BRPM | HEART RATE: 115 BPM | TEMPERATURE: 96.1 F | DIASTOLIC BLOOD PRESSURE: 66 MMHG | BODY MASS INDEX: 28.88 KG/M2 | OXYGEN SATURATION: 98 % | SYSTOLIC BLOOD PRESSURE: 125 MMHG

## 2025-01-20 DIAGNOSIS — C24.0: Primary | ICD-10-CM

## 2025-01-20 PROCEDURE — 99215 OFFICE O/P EST HI 40 MIN: CPT | Performed by: INTERNAL MEDICINE

## 2025-01-20 PROCEDURE — 1125F AMNT PAIN NOTED PAIN PRSNT: CPT | Performed by: INTERNAL MEDICINE

## 2025-01-20 PROCEDURE — 1157F ADVNC CARE PLAN IN RCRD: CPT | Performed by: INTERNAL MEDICINE

## 2025-01-20 PROCEDURE — 1159F MED LIST DOCD IN RCRD: CPT | Performed by: INTERNAL MEDICINE

## 2025-01-20 ASSESSMENT — PAIN SCALES - GENERAL: PAINLEVEL_OUTOF10: 1

## 2025-01-20 NOTE — PROGRESS NOTES
"Patient ID: Elsa Castellano is a 74 y.o. female.    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 Pathology showing 5cm, moderately differentiated adenocarcinoma with negative margins. She continues to recover from surgery. Today we discussed the use of adjuvant capecitabine based on BILCAP. We will plan to start this in a few weeks when patient has recovered to a greater extent.     Plan: CBC, CMP, , CT CAP on 2/12/25. RTC on 2/17/25 to discuss adjuvant capecitabine.     I have placed all orders as outlined above. I advised the patient to schedule the tests and follow-up appointment as discussed by contacting the  on the way out or calling by phone.    Providers:  Surgeon: Dr. Josephine Romeroc:  RadOnc:    Chief complaint: Liver lesion    HPI: 73 year old female with a history of pre-DM, HLD, HTN, CAD s/p MI, COPD, congenital single kidney, who presented as a new patient with adenocarcinoma of the liver.    Patient notes that in 2022 she had a \"small heart attack\" and a CT done on 8/28/22 demonstrated a lateral segment 2 lesion measuring 2.6x2cm. A follow up MRI liver performed on 9/15/23 showed this lesion was 2.8cm and read as \"most compatible with adenoma\". A follow up MRI liver perfromed on 9/9/24 demonstrated this lesion had grown to 4.x2x2.29cm. This was biopsied on 10/2/24 showing moderately differentiated adenocarcinoma involving the liver.      47. Staging CT CAP with 4.3x3x3.7cm hepatic segment 2/3 mass and a 2.6 right adnexal cyst.    1/3/25: hepatectomy and portal lymphadenectomy - pT1aN0, negative " margins, no perineural or LVI     Patient is recovering from surgery. She is slowly regaining her strength. Notes a feeling of mid-abdominal fullness that is slowly improving. She is regaining her appetite. Notes some fatigue. She denies any fevers, chills, chest pain, dyspnea, cough, n/v/c/d.     Past Medical History:   Past Medical History:  08/28/2022: Acute non-ST segment elevation myocardial infarction   (Multi)  No date: AMI (acute myocardial infarction) (Multi)  12/26/2023: Anxiety  No date: At risk for falling  09/08/2023: Chest pain  09/08/2023: Chronic obstructive pulmonary disease (Multi)  09/08/2023: Elevated liver enzymes  No date: H/O abdominal surgery  No date: Hepatic adenocarcinoma (Multi)  08/14/2023: Hepatomegaly  12/30/2023: History of hypertension  09/08/2023: Hyperlipidemia  No date: Hypertension  09/08/2023: Macrocytosis without anemia  No date: Obesity  09/08/2023: Palpitations  01/27/2023: Prediabetes  12/30/2023: Rhinitis  09/08/2023: Shortness of breath  No date: Weakness   Surgical History:    Past Surgical History:   Procedure Laterality Date    APPENDECTOMY      CARDIAC CATHETERIZATION  2022    x2    CATARACT EXTRACTION Bilateral 2014    COLONOSCOPY  2008    COLONOSCOPY  2013    COLONOSCOPY  2018    DILATION AND CURETTAGE OF UTERUS  2012    VAGINA SURGERY  1974      Family History:    Family History   Problem Relation Name Age of Onset    No Known Problems Mother      Stroke Father      Heart attack Father      No Known Problems Brother      Breast cancer Father's Sister      Breast cancer Paternal Grandmother      Hypertension Other      Diabetes Other       Family Oncology History:    Cancer-related family history includes Breast cancer in her father's sister and paternal grandmother.  Social History:    Social History     Tobacco Use    Smoking status: Every Day     Types: Cigarettes     Passive exposure: Past    Smokeless tobacco: Never   Vaping Use    Vaping status: Never Used    Substance Use Topics    Alcohol use: Yes     Alcohol/week: 10.0 standard drinks of alcohol     Types: 10 Standard drinks or equivalent per week    Drug use: Never        Allergies  Allergies   Allergen Reactions    Penicillins Unknown, Anaphylaxis and Swelling    Hydrochlorothiazide Dizziness        Medications  Current Outpatient Medications   Medication Instructions    albuterol (Proventil HFA) 90 mcg/actuation inhaler 2 puffs, Every 6 hours PRN    amLODIPine (NORVASC) 10 mg, oral, Daily    aspirin (Adult Low Dose Aspirin) 81 mg EC tablet 1 tablet, Daily    atorvastatin (LIPITOR) 40 mg, oral, Daily    metoprolol succinate XL (TOPROL-XL) 50 mg, oral, Daily    Mounjaro 5 mg, abdominal subcutaneous, Weekly    umeclidinium (Incruse Ellipta) 62.5 mcg/actuation inhalation 1 puff, Daily          Objective   VS: /66 (BP Location: Right arm, Patient Position: Sitting, BP Cuff Size: Adult long)   Pulse (!) 115   Temp 35.6 °C (96.1 °F) (Temporal)   Resp 18   Wt 83.7 kg (184 lb 6.6 oz)   SpO2 98%   BMI 28.88 kg/m²   Weight:   Vitals:    01/20/25 1609   Weight: 83.7 kg (184 lb 6.6 oz)        PHYSICAL EXAMINATION  ECOG performance status- 0  VS:  /66 (BP Location: Right arm, Patient Position: Sitting, BP Cuff Size: Adult long)   Pulse (!) 115   Temp 35.6 °C (96.1 °F) (Temporal)   Resp 18   Wt 83.7 kg (184 lb 6.6 oz)   SpO2 98%   BMI 28.88 kg/m²   Weight:   Vitals:    01/20/25 1609   Weight: 83.7 kg (184 lb 6.6 oz)       BSA: 1.99 meters squared   Pain Scale: 0    Constitutional: Awake/alert/oriented x3, cooperative and answers questions appropriately.     Eyes: No pallor, conjunctival injection, clear sclera.    Mouth: No ulceration. No thrush.     Head/Neck: Neck supple, no apparent injury, thyroid without mass or tenderness, No JVD, trachea midline, no bruits.     Respiratory/Thorax: Normal breath sounds with bilaterally symmetrical chest expansion. No dullness.      Cardiovascular: No audible  murmurs, normal heart sounds. No pericardial rub.     Gastrointestinal: Nondistended, soft, non-tender, no rebound tenderness or guarding. Healing surgical incision.     Musculoskeletal: No joint swelling, redness.      Extremities: normal extremities, no cyanosis edema, contusions or wounds, no clubbing.     Lymphatic: No significant lymphadenopathy.     Skin: Warm and dry, no lesions, no rashes.     Labs  10/21/24:  47, CEA 1.7, AFP 5    Path  12/3/25  A. Peritoneum, Biopsy:   -- Benign fibroadipose tissue, negative for carcinoma.     B. Liver, Partial Hepatectomy:   -- Moderately differentiated adenocarcinoma, 5 cm, consistent with intrahepatic cholangiocarcinoma, small duct type, with adjacent hepatic parenchyma showing prior procedure related changes.  -- No lymphovascular or perineural invasion identified.  -- Hepatic parenchymal margin, negative for carcinoma.  -- Background liver with:  -- Steatohepatitis with moderate steatosis (40-50%), ballooned hepatocytes, and Jayna-Denk bodies.   -- Delicate zone 3 perisinusoidal fibrosis (trichrome/reticulin stains).  -- Moderate stainable iron in periportal hepatocytes (iron stain).  -- No evidence to support alpha-1-antitrypsin deficiency on PAS-D stain.  -- See note and synoptic report.     C. Lymph Node, Hepatoportal, Excision:   -- Six lymph nodes, negative for carcinoma (0/6).     Note: Rare foci of dysplastic biliary epithelium are identified within the intraductal location, which may represent a precursor lesion / high-grade intraepithelial neoplasia, or cancerization of the duct.    Image  10/30/24 CT CAP   IMPRESSION:  Liver lesion of unknown primary:  1. When compared to the 2023 MRI and CT there has been slight interval increase in size of the left hepatic lobe lesion consistent with patient's biopsy-proven adenocarcinoma. No significant change when compared with the recent MRI dated 09/09/2024. No definite sites of metastatic disease  elsewhere.  2. Diffuse hepatic steatosis, correlate with liver function tests.  3. Cystic lesion of the right adnexa measuring 2.6 cm, further evaluation with a pelvic ultrasound is recommended.  4. Additional stable chronic and incidental findings described above.     9/9/24 MRI Liver  IMPRESSION:  1. Diffuse hepatic steatosis with lateral segment 2 hepatic  progressively enhancing solid lesion measuring currently 4.2 x 2.9 cm. This lesion has significantly increased in size since CT scan dated 08/28/2022 as it was measuring 2.6 x 2.0 cm. The growth, patient's age/demographic coupled with the absence of classic signal intensity of benign hepatic lesions including hemangioma would raise  the concern for malignant process including mass forming  cholangiocarcinoma or alternatively metastatic lesions.  2. 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 No duct dilatation. Please see below guidelines for suggested follow-up  3. Known intestinal malrotation without obstruction    This note was created using a voice recognition system ( the Dragon dictation system). Inaccuracies and misspellings are unintentional.     Patient seen with Dr. Alex Spears.  I concur with the documentation and the plan.    Scottie Castro MD.

## 2025-01-20 NOTE — PROGRESS NOTES
Patient ambulated into clinic with a cane for follow up with Dr. Castro accompanied by her  Allen. Medications and allergies reviewed.     Pathology discussed with Elsa by Dr. Castro.   Reports improvement since being home after her surgery and stay in rehab.   Appetite has improved.     Follow up on 2/17 with CT scan prior to MD appointment.

## 2025-01-21 ENCOUNTER — APPOINTMENT (OUTPATIENT)
Dept: CARDIOLOGY | Facility: CLINIC | Age: 75
End: 2025-01-21
Payer: MEDICARE

## 2025-01-21 VITALS
OXYGEN SATURATION: 99 % | HEART RATE: 82 BPM | SYSTOLIC BLOOD PRESSURE: 120 MMHG | BODY MASS INDEX: 28.82 KG/M2 | WEIGHT: 184 LBS | DIASTOLIC BLOOD PRESSURE: 76 MMHG

## 2025-01-21 DIAGNOSIS — Z86.79 HISTORY OF HYPERTENSION: ICD-10-CM

## 2025-01-21 DIAGNOSIS — I21.4 ACUTE NON-ST SEGMENT ELEVATION MYOCARDIAL INFARCTION (MULTI): Primary | ICD-10-CM

## 2025-01-21 DIAGNOSIS — R06.02 SHORTNESS OF BREATH: ICD-10-CM

## 2025-01-21 DIAGNOSIS — R07.2 PRECORDIAL PAIN: ICD-10-CM

## 2025-01-21 DIAGNOSIS — E78.2 MIXED HYPERLIPIDEMIA: ICD-10-CM

## 2025-01-21 PROCEDURE — 1159F MED LIST DOCD IN RCRD: CPT | Performed by: INTERNAL MEDICINE

## 2025-01-21 PROCEDURE — 1126F AMNT PAIN NOTED NONE PRSNT: CPT | Performed by: INTERNAL MEDICINE

## 2025-01-21 PROCEDURE — 99214 OFFICE O/P EST MOD 30 MIN: CPT | Performed by: INTERNAL MEDICINE

## 2025-01-21 PROCEDURE — 1160F RVW MEDS BY RX/DR IN RCRD: CPT | Performed by: INTERNAL MEDICINE

## 2025-01-21 PROCEDURE — 1157F ADVNC CARE PLAN IN RCRD: CPT | Performed by: INTERNAL MEDICINE

## 2025-01-21 RX ORDER — METOPROLOL SUCCINATE 50 MG/1
50 TABLET, EXTENDED RELEASE ORAL DAILY
Qty: 90 TABLET | Refills: 3 | Status: SHIPPED | OUTPATIENT
Start: 2025-01-21 | End: 2025-01-24

## 2025-01-21 ASSESSMENT — PAIN SCALES - GENERAL: PAINLEVEL_OUTOF10: 0-NO PAIN

## 2025-01-21 ASSESSMENT — ENCOUNTER SYMPTOMS
OCCASIONAL FEELINGS OF UNSTEADINESS: 0
LOSS OF SENSATION IN FEET: 0
DEPRESSION: 0

## 2025-01-21 NOTE — PATIENT INSTRUCTIONS
Stop amlodipine.    Start taking metoprolol succinate 50 mg oral daily.    If your blood pressure starts increasing more than 130 systolic or more than 85 diastolic then take half of the amlodipine.    Continue other medications.    I will see you in 6

## 2025-01-21 NOTE — PROGRESS NOTES
History of present illness:  This is a very pleasant 74-year-old female with history of non-STEMI. Cardiac catheterization showed showed stable moderate LAD disease with negative FFR. Echo in 2021 was ok.  Patient returns to my office for follow-up.  Has been feeling overall okay.  Denies any chest pain shortness of breath palpitations dizziness lightheadedness or syncope.  She was diagnosed recently of liver cancer and underwent mass resection.  She will be having oral chemotherapy.  She had a stage I liver cancer.     Past Medical History:   Diagnosis Date    Acute non-ST segment elevation myocardial infarction (Multi) 08/28/2022    AMI (acute myocardial infarction) (Multi)     Anxiety 12/26/2023    At risk for falling     Chest pain 09/08/2023    Chronic obstructive pulmonary disease (Multi) 09/08/2023    Elevated liver enzymes 09/08/2023    H/O abdominal surgery     Hepatic adenocarcinoma (Multi)     Hepatomegaly 08/14/2023    History of hypertension 12/30/2023    Hyperlipidemia 09/08/2023    Hypertension     Macrocytosis without anemia 09/08/2023    Obesity     Palpitations 09/08/2023    Prediabetes 01/27/2023    Rhinitis 12/30/2023    Shortness of breath 09/08/2023    Weakness        Past Surgical History:   Procedure Laterality Date    APPENDECTOMY      CARDIAC CATHETERIZATION  2022    x2    CATARACT EXTRACTION Bilateral 2014    COLONOSCOPY  2008    COLONOSCOPY  2013    COLONOSCOPY  2018    DILATION AND CURETTAGE OF UTERUS  2012    VAGINA SURGERY  1974       Allergies   Allergen Reactions    Penicillins Unknown, Anaphylaxis and Swelling    Hydrochlorothiazide Dizziness        reports that she has been smoking cigarettes. She has been exposed to tobacco smoke. She has never used smokeless tobacco. She reports current alcohol use of about 10.0 standard drinks of alcohol per week. She reports that she does not use drugs.    Family History   Problem Relation Name Age of Onset    No Known Problems Mother       Stroke Father      Heart attack Father      No Known Problems Brother      Breast cancer Father's Sister      Breast cancer Paternal Grandmother      Hypertension Other      Diabetes Other         Patient's Medications   New Prescriptions    No medications on file   Previous Medications    ALBUTEROL (PROVENTIL HFA) 90 MCG/ACTUATION INHALER    Inhale 2 puffs every 6 hours if needed for wheezing.    AMLODIPINE (NORVASC) 10 MG TABLET    TAKE 1 TABLET DAILY    ASPIRIN (ADULT LOW DOSE ASPIRIN) 81 MG EC TABLET    Take 1 tablet (81 mg) by mouth once daily.    ATORVASTATIN (LIPITOR) 40 MG TABLET    Take 1 tablet (40 mg) by mouth once daily.    METOPROLOL SUCCINATE XL (TOPROL-XL) 50 MG 24 HR TABLET    Take 1 tablet (50 mg) by mouth once daily.    TIRZEPATIDE (MOUNJARO) 5 MG/0.5 ML PEN INJECTOR    5 mg by abdominal subcutaneous route 1 (one) time per week.    UMECLIDINIUM (INCRUSE ELLIPTA) 62.5 MCG/ACTUATION INHALATION    Inhale 1 puff (62.5 mcg) once daily.   Modified Medications    No medications on file   Discontinued Medications    No medications on file       Objective   Physical Exam  General: Patient in no acute distress   HEENT: Atraumatic normocephalic.  Neck: Supple, jugular venous pressure within normal limit.  No bruits  Lungs: Clear to auscultation bilaterally  Cardiovascular: Regular rate and rhythm, normal heart sounds, no murmurs rubs or gallops  Abdomen: Soft nontender nondistended.  Normal bowel sounds.  Extremities: Warm to touch, no edema.    Lab Review   Lab on 12/31/2024   Component Date Value    WBC 12/31/2024 11.7 (H)     nRBC 12/31/2024 0.0     RBC 12/31/2024 3.89 (L)     Hemoglobin 12/31/2024 11.5 (L)     Hematocrit 12/31/2024 36.2     MCV 12/31/2024 93     MCH 12/31/2024 29.6     MCHC 12/31/2024 31.8 (L)     RDW 12/31/2024 13.5     Platelets 12/31/2024 298     Neutrophils % 12/31/2024 70.5     Immature Granulocytes %,* 12/31/2024 0.5     Lymphocytes % 12/31/2024 16.6     Monocytes % 12/31/2024 9.5      Eosinophils % 12/31/2024 2.1     Basophils % 12/31/2024 0.8     Neutrophils Absolute 12/31/2024 8.22 (H)     Immature Granulocytes Ab* 12/31/2024 0.06     Lymphocytes Absolute 12/31/2024 1.93     Monocytes Absolute 12/31/2024 1.11 (H)     Eosinophils Absolute 12/31/2024 0.24     Basophils Absolute 12/31/2024 0.09     Glucose 12/31/2024 126 (H)     Sodium 12/31/2024 134 (L)     Potassium 12/31/2024 4.0     Chloride 12/31/2024 105     Bicarbonate 12/31/2024 19 (L)     Anion Gap 12/31/2024 14     Urea Nitrogen 12/31/2024 15     Creatinine 12/31/2024 0.86     eGFR 12/31/2024 71     Calcium 12/31/2024 9.1     Albumin 12/31/2024 3.9     Alkaline Phosphatase 12/31/2024 58     Total Protein 12/31/2024 6.5     AST 12/31/2024 15     Bilirubin, Total 12/31/2024 0.5     ALT 12/31/2024 11     Cholesterol 12/31/2024 146     HDL-Cholesterol 12/31/2024 33.9     Cholesterol/HDL Ratio 12/31/2024 4.3     LDL Calculated 12/31/2024 89     VLDL 12/31/2024 23     Triglycerides 12/31/2024 115     Non HDL Cholesterol 12/31/2024 112     Hemoglobin A1C 12/31/2024 5.3     Estimated Average Glucose 12/31/2024 105    Lab Requisition on 12/19/2024   Component Date Value    Glucose 12/19/2024 100 (H)     Sodium 12/19/2024 138     Potassium 12/19/2024 4.2     Chloride 12/19/2024 108 (H)     Bicarbonate 12/19/2024 21     Anion Gap 12/19/2024 13     Urea Nitrogen 12/19/2024 11     Creatinine 12/19/2024 0.55     eGFR 12/19/2024 >90     Calcium 12/19/2024 8.5 (L)     WBC 12/19/2024 7.3     nRBC 12/19/2024 0.0     RBC 12/19/2024 3.56 (L)     Hemoglobin 12/19/2024 10.7 (L)     Hematocrit 12/19/2024 35.3 (L)     MCV 12/19/2024 99     MCH 12/19/2024 30.1     MCHC 12/19/2024 30.3 (L)     RDW 12/19/2024 14.4     Platelets 12/19/2024 337    Lab Requisition on 12/12/2024   Component Date Value    Glucose 12/12/2024 105 (H)     Sodium 12/12/2024 136     Potassium 12/12/2024 3.7     Chloride 12/12/2024 107     Bicarbonate 12/12/2024 19 (L)     Anion Gap  12/12/2024 14     Urea Nitrogen 12/12/2024 10     Creatinine 12/12/2024 0.71     eGFR 12/12/2024 89     Calcium 12/12/2024 8.0 (L)     WBC 12/12/2024 12.9 (H)     nRBC 12/12/2024 0.0     RBC 12/12/2024 3.59 (L)     Hemoglobin 12/12/2024 11.0 (L)     Hematocrit 12/12/2024 33.8 (L)     MCV 12/12/2024 94     MCH 12/12/2024 30.6     MCHC 12/12/2024 32.5     RDW 12/12/2024 14.8 (H)     Platelets 12/12/2024 262    No results displayed because visit has over 200 results.           Assessment/Plan   Patient Active Problem List   Diagnosis    Chronic obstructive pulmonary disease (Multi)    Elevated liver enzymes    Macrocytosis without anemia    Hyperlipidemia    Anxiety    Hepatomegaly    Prediabetes    History of hypertension    Obesity with body mass index 30 or greater    Rhinitis      This is a very pleasant 74-year-old female with history of non-STEMI. Cardiac catheterization showed showed stable moderate LAD disease with negative FFR. Echo in 2021 was ok.  Patient returns to my office for follow-up.  Has been feeling overall okay.  Denies any chest pain shortness of breath palpitations dizziness lightheadedness or syncope.  She was diagnosed recently of liver cancer and underwent mass resection.  She will be having oral chemotherapy.  She had a stage I liver cancer.        From cardiac standpoint patient appears stable.  She was taken off her metoprolol and continued on amlodipine but if you feel that she is tachycardic slightly sinus tachycardia likely withdrawal from beta-blockers.  Will restart metoprolol succinate 50 mg oral daily.  Stop amlodipine.  Continue aspirin and atorvastatin.  LDL at target.  Patient continues to lose weight.  Will follow-up in 6 months.        Ady Bedolla MD

## 2025-01-22 DIAGNOSIS — R73.03 PREDIABETES: ICD-10-CM

## 2025-01-22 RX ORDER — TIRZEPATIDE 5 MG/.5ML
5 INJECTION, SOLUTION SUBCUTANEOUS WEEKLY
Qty: 8 ML | Refills: 3 | Status: SHIPPED | OUTPATIENT
Start: 2025-01-22

## 2025-01-24 DIAGNOSIS — R06.02 SHORTNESS OF BREATH: ICD-10-CM

## 2025-01-24 RX ORDER — METOPROLOL SUCCINATE 50 MG/1
50 TABLET, EXTENDED RELEASE ORAL DAILY
Qty: 90 TABLET | Refills: 3 | Status: SHIPPED | OUTPATIENT
Start: 2025-01-24 | End: 2026-01-19

## 2025-02-04 ENCOUNTER — TELEPHONE (OUTPATIENT)
Dept: PRIMARY CARE | Facility: CLINIC | Age: 75
End: 2025-02-04

## 2025-02-06 ENCOUNTER — LAB (OUTPATIENT)
Dept: LAB | Facility: HOSPITAL | Age: 75
End: 2025-02-06
Payer: MEDICARE

## 2025-02-06 DIAGNOSIS — C24.0: ICD-10-CM

## 2025-02-06 DIAGNOSIS — C24.0 MALIGNANT NEOPLASM OF EXTRAHEPATIC BILE DUCT (MULTI): Primary | ICD-10-CM

## 2025-02-06 LAB
ALBUMIN SERPL BCP-MCNC: 4.1 G/DL (ref 3.4–5)
ALP SERPL-CCNC: 42 U/L (ref 33–136)
ALT SERPL W P-5'-P-CCNC: 19 U/L (ref 7–45)
ANION GAP SERPL CALCULATED.3IONS-SCNC: 10 MMOL/L (ref 10–20)
AST SERPL W P-5'-P-CCNC: 24 U/L (ref 9–39)
BASOPHILS # BLD AUTO: 0.04 X10*3/UL (ref 0–0.1)
BASOPHILS NFR BLD AUTO: 0.6 %
BILIRUB SERPL-MCNC: 0.4 MG/DL (ref 0–1.2)
BUN SERPL-MCNC: 12 MG/DL (ref 6–23)
CALCIUM SERPL-MCNC: 9.1 MG/DL (ref 8.6–10.3)
CHLORIDE SERPL-SCNC: 105 MMOL/L (ref 98–107)
CO2 SERPL-SCNC: 24 MMOL/L (ref 21–32)
CREAT SERPL-MCNC: 0.77 MG/DL (ref 0.5–1.05)
EGFRCR SERPLBLD CKD-EPI 2021: 81 ML/MIN/1.73M*2
EOSINOPHIL # BLD AUTO: 0.16 X10*3/UL (ref 0–0.4)
EOSINOPHIL NFR BLD AUTO: 2.5 %
ERYTHROCYTE [DISTWIDTH] IN BLOOD BY AUTOMATED COUNT: 15.9 % (ref 11.5–14.5)
GLUCOSE SERPL-MCNC: 91 MG/DL (ref 74–99)
HCT VFR BLD AUTO: 40.9 % (ref 36–46)
HGB BLD-MCNC: 12.5 G/DL (ref 12–16)
IMM GRANULOCYTES # BLD AUTO: 0.01 X10*3/UL (ref 0–0.5)
IMM GRANULOCYTES NFR BLD AUTO: 0.2 % (ref 0–0.9)
LYMPHOCYTES # BLD AUTO: 2.78 X10*3/UL (ref 0.8–3)
LYMPHOCYTES NFR BLD AUTO: 43.3 %
MCH RBC QN AUTO: 28.9 PG (ref 26–34)
MCHC RBC AUTO-ENTMCNC: 30.6 G/DL (ref 32–36)
MCV RBC AUTO: 95 FL (ref 80–100)
MONOCYTES # BLD AUTO: 0.73 X10*3/UL (ref 0.05–0.8)
MONOCYTES NFR BLD AUTO: 11.4 %
NEUTROPHILS # BLD AUTO: 2.7 X10*3/UL (ref 1.6–5.5)
NEUTROPHILS NFR BLD AUTO: 42 %
NRBC BLD-RTO: 0 /100 WBCS (ref 0–0)
PLATELET # BLD AUTO: 204 X10*3/UL (ref 150–450)
POTASSIUM SERPL-SCNC: 4.2 MMOL/L (ref 3.5–5.3)
PROT SERPL-MCNC: 6.9 G/DL (ref 6.4–8.2)
RBC # BLD AUTO: 4.32 X10*6/UL (ref 4–5.2)
SODIUM SERPL-SCNC: 135 MMOL/L (ref 136–145)
WBC # BLD AUTO: 6.4 X10*3/UL (ref 4.4–11.3)

## 2025-02-06 PROCEDURE — 86301 IMMUNOASSAY TUMOR CA 19-9: CPT

## 2025-02-06 PROCEDURE — 80053 COMPREHEN METABOLIC PANEL: CPT

## 2025-02-06 PROCEDURE — 85025 COMPLETE CBC W/AUTO DIFF WBC: CPT

## 2025-02-07 LAB — CANCER AG19-9 SERPL-ACNC: 33.98 U/ML

## 2025-02-13 ENCOUNTER — APPOINTMENT (OUTPATIENT)
Dept: GASTROENTEROLOGY | Facility: HOSPITAL | Age: 75
End: 2025-02-13
Payer: MEDICARE

## 2025-02-14 ENCOUNTER — HOSPITAL ENCOUNTER (OUTPATIENT)
Dept: RADIOLOGY | Facility: CLINIC | Age: 75
Discharge: HOME | End: 2025-02-14
Payer: MEDICARE

## 2025-02-14 DIAGNOSIS — C24.0: ICD-10-CM

## 2025-02-14 PROCEDURE — 2550000001 HC RX 255 CONTRASTS: Performed by: INTERNAL MEDICINE

## 2025-02-14 PROCEDURE — 74177 CT ABD & PELVIS W/CONTRAST: CPT

## 2025-02-14 RX ADMIN — IOHEXOL 75 ML: 350 INJECTION, SOLUTION INTRAVENOUS at 10:04

## 2025-02-17 ENCOUNTER — OFFICE VISIT (OUTPATIENT)
Dept: HEMATOLOGY/ONCOLOGY | Facility: CLINIC | Age: 75
End: 2025-02-17
Payer: MEDICARE

## 2025-02-17 VITALS
BODY MASS INDEX: 28.47 KG/M2 | OXYGEN SATURATION: 98 % | RESPIRATION RATE: 17 BRPM | SYSTOLIC BLOOD PRESSURE: 128 MMHG | TEMPERATURE: 95.9 F | WEIGHT: 181.77 LBS | DIASTOLIC BLOOD PRESSURE: 68 MMHG | HEART RATE: 76 BPM

## 2025-02-17 DIAGNOSIS — C24.0: Primary | ICD-10-CM

## 2025-02-17 PROCEDURE — 1159F MED LIST DOCD IN RCRD: CPT | Performed by: INTERNAL MEDICINE

## 2025-02-17 PROCEDURE — 99214 OFFICE O/P EST MOD 30 MIN: CPT | Performed by: INTERNAL MEDICINE

## 2025-02-17 PROCEDURE — 1126F AMNT PAIN NOTED NONE PRSNT: CPT | Performed by: INTERNAL MEDICINE

## 2025-02-17 PROCEDURE — 1157F ADVNC CARE PLAN IN RCRD: CPT | Performed by: INTERNAL MEDICINE

## 2025-02-17 RX ORDER — POLYETHYLENE GLYCOL 3350 17 G/17G
17 POWDER, FOR SOLUTION ORAL DAILY
COMMUNITY

## 2025-02-17 RX ORDER — CAPECITABINE 500 MG/1
1000 TABLET, FILM COATED ORAL 2 TIMES DAILY
Qty: 112 TABLET | Refills: 7 | Status: SHIPPED | OUTPATIENT
Start: 2025-02-17

## 2025-02-17 RX ORDER — ACETAMINOPHEN, DIPHENHYDRAMINE HCL, PHENYLEPHRINE HCL 325; 25; 5 MG/1; MG/1; MG/1
TABLET ORAL DAILY
COMMUNITY

## 2025-02-17 ASSESSMENT — PAIN SCALES - GENERAL: PAINLEVEL_OUTOF10: 0-NO PAIN

## 2025-02-17 NOTE — PROGRESS NOTES
Patient ambulated into clinic for follow up with Dr. Castro accompanied by her , Allen. Medications and allergies reviewed.     Reports fatigue, decreased appetite and early satiety.   Walking without her cane today.   Reports her strength has improved.  Has lost 3 lbs since last visit.   Denies pain, but stated she has an annoying pressure in her abdomen.     Follow up in one month with labs prior to MD appt.

## 2025-02-17 NOTE — PROGRESS NOTES
Patient here for follow up visit with Dr. 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 She ambulated unassisted without issues    Medications and Allergies reviewed and reconciled this visit.    No concerns or complaints noted at this time.     Complete chemo class for  oral Capecitabine with red bag and yellow fever card given and reviewed     Follow up per MD request.    Pt. reports availability and use of mychart, Reviewed this is a good place to communicate with the team as well as review labs and upcoming orders.     No barriers to education noted, patient agrees to current plan and verbalized understanding using teach back method.

## 2025-02-17 NOTE — PROGRESS NOTES
"Patient ID: Elsa Castellano is a 74 y.o. female.    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 Pathology showing 5cm, moderately differentiated adenocarcinoma with negative margins. She continues to recover from surgery.  However, she is still feeling quite fatigued and her eating is slowly improving.      I have reviewed her CT scan results.  The new left upper quadrant nodule is likely postoperative change.  I plan to do a short follow-up scan in 2 months or so.    Today I discussed adjuvant capecitabine based on BILCAP again.  She is willing to pursue this medication.  However, she does not feel ready to start capecitabine yet right away.  I prescribed capecitabine today and I instructed her to start in next 2 to 3 weeks.      I ordered DPD screening for next week.    Plan: March 17, 2025 with labs.       I have placed all orders as outlined above. I advised the patient to schedule the tests and follow-up appointment as discussed by contacting the  on the way out or calling by phone.    Providers:  Surgeon: Dr. Josephine Romeroc:  [facility]    Chief complaint: Liver lesion    HPI: 73 year old female with a history of pre-DM, HLD, HTN, CAD s/p MI, COPD, congenital single kidney, who presented as a new patient with adenocarcinoma of the liver.    Patient notes that in 2022 she had a \"small heart attack\" and a CT done on 8/28/22 demonstrated a lateral segment 2 lesion measuring 2.6x2cm. A follow up MRI liver performed on 9/15/23 showed this lesion was 2.8cm and read as \"most compatible with adenoma\". A follow up MRI liver " perfromed on 9/9/24 demonstrated this lesion had grown to 4.x2x2.29cm. This was biopsied on 10/2/24 showing moderately differentiated adenocarcinoma involving the liver.      47. Staging CT CAP with 4.3x3x3.7cm hepatic segment 2/3 mass and a 2.6 right adnexal cyst.    1/3/25: hepatectomy and portal lymphadenectomy - pT1aN0, negative margins, no perineural or LVI     February 14, 2024: CT scan of chest abdomen pelvis showed the following-  IMPRESSION:  Cholangiocarcinoma restaging scan. When compared to the prior  examination dated 10/30/2024, there has been interval postsurgical  changes of a partial hepatectomy. New soft tissue nodule in the left  upper quadrant and upper abdomen anterior peritoneum, short-term  follow-up scan in 4-6 weeks recommended this could represent true  recurrence versus evolving postoperative changes. In addition  correlation with serum tumor markers can also be considered. New  phlegmonous tissue and fluid in the left upper quadrant likely  related to recent surgery. Additional stable chronic and incidental  findings described above.    Interval history:  Patient is recovering from surgery. She is slowly regaining her strength. Notes a feeling of mid-abdominal fullness that is slowly improving. She is regaining her appetite, but her overall oral intake is still modest. She denies any fevers, chills, chest pain, dyspnea, cough, n/v/c/d.     Past Medical History:   Past Medical History:  08/28/2022: Acute non-ST segment elevation myocardial infarction   (Multi)  No date: AMI (acute myocardial infarction) (Multi)  12/26/2023: Anxiety  No date: At risk for falling  09/08/2023: Chest pain  09/08/2023: Chronic obstructive pulmonary disease (Multi)  09/08/2023: Elevated liver enzymes  No date: H/O abdominal surgery  No date: Hepatic adenocarcinoma (Multi)  08/14/2023: Hepatomegaly  12/30/2023: History of hypertension  09/08/2023: Hyperlipidemia  No date: Hypertension  09/08/2023:  Macrocytosis without anemia  No date: Obesity  09/08/2023: Palpitations  01/27/2023: Prediabetes  12/30/2023: Rhinitis  09/08/2023: Shortness of breath  No date: Weakness   Surgical History:    Past Surgical History:   Procedure Laterality Date    APPENDECTOMY      CARDIAC CATHETERIZATION  2022    x2    CATARACT EXTRACTION Bilateral 2014    COLONOSCOPY  2008    COLONOSCOPY  2013    COLONOSCOPY  2018    DILATION AND CURETTAGE OF UTERUS  2012    VAGINA SURGERY  1974      Family History:    Family History   Problem Relation Name Age of Onset    No Known Problems Mother      Stroke Father      Heart attack Father      No Known Problems Brother      Breast cancer Father's Sister      Breast cancer Paternal Grandmother      Hypertension Other      Diabetes Other       Family Oncology History:    Cancer-related family history includes Breast cancer in her father's sister and paternal grandmother.  Social History:    Social History     Tobacco Use    Smoking status: Every Day     Types: Cigarettes     Passive exposure: Past    Smokeless tobacco: Never   Vaping Use    Vaping status: Never Used   Substance Use Topics    Alcohol use: Yes     Alcohol/week: 10.0 standard drinks of alcohol     Types: 10 Standard drinks or equivalent per week    Drug use: Never        Allergies  Allergies   Allergen Reactions    Penicillins Unknown, Anaphylaxis and Swelling    Hydrochlorothiazide Dizziness        Medications  Current Outpatient Medications   Medication Instructions    albuterol (Proventil HFA) 90 mcg/actuation inhaler 2 puffs, Every 6 hours PRN    aspirin (Adult Low Dose Aspirin) 81 mg EC tablet 1 tablet, Daily    atorvastatin (LIPITOR) 40 mg, oral, Daily    metoprolol succinate XL (TOPROL-XL) 50 mg, oral, Daily    Mounjaro 5 mg, abdominal subcutaneous, Weekly    umeclidinium (Incruse Ellipta) 62.5 mcg/actuation inhalation 1 puff, Daily          Objective   VS: /68   Pulse 76   Temp 35.5 °C (95.9 °F) (Temporal)   Resp 17    Wt 82.5 kg (181 lb 12.3 oz)   SpO2 98%   BMI 28.47 kg/m²   Weight:   Vitals:    02/17/25 1435   Weight: 82.5 kg (181 lb 12.3 oz)        PHYSICAL EXAMINATION  ECOG performance status- 1  VS:  /68   Pulse 76   Temp 35.5 °C (95.9 °F) (Temporal)   Resp 17   Wt 82.5 kg (181 lb 12.3 oz)   SpO2 98%   BMI 28.47 kg/m²   Weight:   Vitals:    02/17/25 1435   Weight: 82.5 kg (181 lb 12.3 oz)       BSA: 1.97 meters squared   Pain Scale: 0    Constitutional: Awake/alert/oriented x3, cooperative and answers questions appropriately.     Eyes: No pallor, conjunctival injection, clear sclera.    Mouth: No ulceration. No thrush.     Head/Neck: Neck supple, no apparent injury, thyroid without mass or tenderness, No JVD, trachea midline, no bruits.     Respiratory/Thorax: Normal breath sounds with bilaterally symmetrical chest expansion. No dullness.      Cardiovascular: No audible murmurs, normal heart sounds. No pericardial rub.     Gastrointestinal: Nondistended, soft, non-tender, no rebound tenderness or guarding. Healing surgical incision.     Musculoskeletal: No joint swelling, redness.      Extremities: normal extremities, no cyanosis edema, contusions or wounds, no clubbing.     Lymphatic: No significant lymphadenopathy.     Skin: Warm and dry, no lesions, no rashes.     Labs  10/21/24:  47, CEA 1.7, AFP 5    Path  12/3/25  A. Peritoneum, Biopsy:   -- Benign fibroadipose tissue, negative for carcinoma.     B. Liver, Partial Hepatectomy:   -- Moderately differentiated adenocarcinoma, 5 cm, consistent with intrahepatic cholangiocarcinoma, small duct type, with adjacent hepatic parenchyma showing prior procedure related changes.  -- No lymphovascular or perineural invasion identified.  -- Hepatic parenchymal margin, negative for carcinoma.  -- Background liver with:  -- Steatohepatitis with moderate steatosis (40-50%), ballooned hepatocytes, and Jayna-Denk bodies.   -- Delicate zone 3 perisinusoidal fibrosis  (trichrome/reticulin stains).  -- Moderate stainable iron in periportal hepatocytes (iron stain).  -- No evidence to support alpha-1-antitrypsin deficiency on PAS-D stain.  -- See note and synoptic report.     C. Lymph Node, Hepatoportal, Excision:   -- Six lymph nodes, negative for carcinoma (0/6).     Note: Rare foci of dysplastic biliary epithelium are identified within the intraductal location, which may represent a precursor lesion / high-grade intraepithelial neoplasia, or cancerization of the duct.    Image  10/30/24 CT CAP   IMPRESSION:  Liver lesion of unknown primary:  1. When compared to the 2023 MRI and CT there has been slight interval increase in size of the left hepatic lobe lesion consistent with patient's biopsy-proven adenocarcinoma. No significant change when compared with the recent MRI dated 09/09/2024. No definite sites of metastatic disease elsewhere.  2. Diffuse hepatic steatosis, correlate with liver function tests.  3. Cystic lesion of the right adnexa measuring 2.6 cm, further evaluation with a pelvic ultrasound is recommended.  4. Additional stable chronic and incidental findings described above.     9/9/24 MRI Liver  IMPRESSION:  1. Diffuse hepatic steatosis with lateral segment 2 hepatic  progressively enhancing solid lesion measuring currently 4.2 x 2.9 cm. This lesion has significantly increased in size since CT scan dated 08/28/2022 as it was measuring 2.6 x 2.0 cm. The growth, patient's age/demographic coupled with the absence of classic signal intensity of benign hepatic lesions including hemangioma would raise  the concern for malignant process including mass forming  cholangiocarcinoma or alternatively metastatic lesions.  2. 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 No duct dilatation. Please see below guidelines for suggested follow-up  3. Known intestinal malrotation without obstruction    This note was created using a voice  recognition system ( the Dragon dictation system). Inaccuracies and misspellings are unintentional.       Scottie Castro MD.

## 2025-02-18 ENCOUNTER — SPECIALTY PHARMACY (OUTPATIENT)
Dept: PHARMACY | Facility: CLINIC | Age: 75
End: 2025-02-18

## 2025-02-25 ENCOUNTER — LAB (OUTPATIENT)
Dept: LAB | Facility: HOSPITAL | Age: 75
End: 2025-02-25
Payer: COMMERCIAL

## 2025-02-25 DIAGNOSIS — C24.0: ICD-10-CM

## 2025-02-26 ENCOUNTER — PATIENT OUTREACH (OUTPATIENT)
Dept: CARE COORDINATION | Facility: CLINIC | Age: 75
End: 2025-02-26
Payer: COMMERCIAL

## 2025-02-26 ENCOUNTER — LAB (OUTPATIENT)
Dept: LAB | Facility: HOSPITAL | Age: 75
End: 2025-02-26
Payer: MEDICARE

## 2025-02-26 DIAGNOSIS — C24.0 MALIGNANT NEOPLASM OF EXTRAHEPATIC BILE DUCT (MULTI): Primary | ICD-10-CM

## 2025-02-26 LAB
ALBUMIN SERPL BCP-MCNC: 4.5 G/DL (ref 3.4–5)
ALP SERPL-CCNC: 48 U/L (ref 33–136)
ALT SERPL W P-5'-P-CCNC: 15 U/L (ref 7–45)
ANION GAP SERPL CALCULATED.3IONS-SCNC: 12 MMOL/L (ref 10–20)
AST SERPL W P-5'-P-CCNC: 20 U/L (ref 9–39)
BASOPHILS # BLD AUTO: 0.07 X10*3/UL (ref 0–0.1)
BASOPHILS NFR BLD AUTO: 0.9 %
BILIRUB SERPL-MCNC: 0.5 MG/DL (ref 0–1.2)
BUN SERPL-MCNC: 13 MG/DL (ref 6–23)
CALCIUM SERPL-MCNC: 10.4 MG/DL (ref 8.6–10.3)
CANCER AG19-9 SERPL-ACNC: 40.42 U/ML
CHLORIDE SERPL-SCNC: 106 MMOL/L (ref 98–107)
CO2 SERPL-SCNC: 27 MMOL/L (ref 21–32)
CREAT SERPL-MCNC: 0.93 MG/DL (ref 0.5–1.05)
EGFRCR SERPLBLD CKD-EPI 2021: 64 ML/MIN/1.73M*2
EOSINOPHIL # BLD AUTO: 0.14 X10*3/UL (ref 0–0.4)
EOSINOPHIL NFR BLD AUTO: 1.8 %
ERYTHROCYTE [DISTWIDTH] IN BLOOD BY AUTOMATED COUNT: 17 % (ref 11.5–14.5)
GLUCOSE SERPL-MCNC: 110 MG/DL (ref 74–99)
HCT VFR BLD AUTO: 47 % (ref 36–46)
HGB BLD-MCNC: 13.1 G/DL (ref 12–16)
IMM GRANULOCYTES # BLD AUTO: 0.01 X10*3/UL (ref 0–0.5)
IMM GRANULOCYTES NFR BLD AUTO: 0.1 % (ref 0–0.9)
LYMPHOCYTES # BLD AUTO: 2.93 X10*3/UL (ref 0.8–3)
LYMPHOCYTES NFR BLD AUTO: 36.9 %
MCH RBC QN AUTO: 28.5 PG (ref 26–34)
MCHC RBC AUTO-ENTMCNC: 27.9 G/DL (ref 32–36)
MCV RBC AUTO: 102 FL (ref 80–100)
MONOCYTES # BLD AUTO: 0.7 X10*3/UL (ref 0.05–0.8)
MONOCYTES NFR BLD AUTO: 8.8 %
NEUTROPHILS # BLD AUTO: 4.08 X10*3/UL (ref 1.6–5.5)
NEUTROPHILS NFR BLD AUTO: 51.5 %
NRBC BLD-RTO: 0 /100 WBCS (ref 0–0)
PLATELET # BLD AUTO: 189 X10*3/UL (ref 150–450)
POTASSIUM SERPL-SCNC: 5.1 MMOL/L (ref 3.5–5.3)
PROT SERPL-MCNC: 7.1 G/DL (ref 6.4–8.2)
RBC # BLD AUTO: 4.6 X10*6/UL (ref 4–5.2)
SODIUM SERPL-SCNC: 140 MMOL/L (ref 136–145)
WBC # BLD AUTO: 7.9 X10*3/UL (ref 4.4–11.3)

## 2025-02-26 PROCEDURE — 86301 IMMUNOASSAY TUMOR CA 19-9: CPT

## 2025-02-26 PROCEDURE — 85025 COMPLETE CBC W/AUTO DIFF WBC: CPT

## 2025-02-26 PROCEDURE — 80053 COMPREHEN METABOLIC PANEL: CPT

## 2025-02-26 NOTE — PROGRESS NOTES
Spoke to Adwoa, discussed how her surgery went and how recovery has been. She states her appetite is still low. She has lost about ~20 lbs unintentionally. She has been eating breakfast but usually not hungry during the day. It has been difficult, she is not able to tolerate certain foods such as yogurt, cottage cheese. We discussed sticking with easy to digest foods and what she knows she can tolerate: fruit, eggs, applesauce, plain foods, etc. Prioritize protein and try to avoid skipping meals, have something small if a full meal isn't realistic. Adding in a protein shake if she can tolerate. [facility] has plant based options if dairy isn't tolerated. Outreach scheduled to check in again, she has my number and can call if she needs anything sooner.

## 2025-02-27 ENCOUNTER — LAB (OUTPATIENT)
Dept: LAB | Facility: CLINIC | Age: 75
End: 2025-02-27
Payer: MEDICARE

## 2025-02-27 DIAGNOSIS — C24.0: ICD-10-CM

## 2025-02-27 PROCEDURE — 36415 COLL VENOUS BLD VENIPUNCTURE: CPT

## 2025-03-03 ENCOUNTER — TELEPHONE (OUTPATIENT)
Dept: HEMATOLOGY/ONCOLOGY | Facility: CLINIC | Age: 75
End: 2025-03-03
Payer: COMMERCIAL

## 2025-03-03 ENCOUNTER — SPECIALTY PHARMACY (OUTPATIENT)
Dept: PHARMACY | Facility: CLINIC | Age: 75
End: 2025-03-03

## 2025-03-03 DIAGNOSIS — C22.1 CHOLANGIOCARCINOMA (MULTI): Primary | ICD-10-CM

## 2025-03-03 PROCEDURE — RXMED WILLOW AMBULATORY MEDICATION CHARGE

## 2025-03-03 NOTE — TELEPHONE ENCOUNTER
Spoke to Elsa.   Lab orders are in for a lab draw at [facility] week of 3-10.  Gave her the phone number to  Specialty Pharmacy to call and discuss coverage and delivery for Xeloda.Script was sent there 2-17 and she has not heard   from them yet.  Instructed her to call me back if any more issues with the script.  She did a teachback.

## 2025-03-04 ENCOUNTER — PHARMACY VISIT (OUTPATIENT)
Dept: PHARMACY | Facility: CLINIC | Age: 75
End: 2025-03-04
Payer: COMMERCIAL

## 2025-03-05 LAB
DPYD GENE MUT ANL BLD/T: NORMAL
ELECTRONICALLY SIGNED BY: NORMAL

## 2025-03-06 ENCOUNTER — TELEPHONE (OUTPATIENT)
Dept: HEMATOLOGY/ONCOLOGY | Facility: CLINIC | Age: 75
End: 2025-03-06
Payer: COMMERCIAL

## 2025-03-06 NOTE — TELEPHONE ENCOUNTER
Reviewed side effects with Elsa.  Explained that Xeloda is usually well tolerated but everyone is different and side effects can happen at any time.  Encouraged her to drink lots of fluid.  She will begin this on Monday 3-10-25.  Dr. Castro notified of this call.

## 2025-03-10 ENCOUNTER — SPECIALTY PHARMACY (OUTPATIENT)
Dept: HEMATOLOGY/ONCOLOGY | Facility: HOSPITAL | Age: 75
End: 2025-03-10
Payer: COMMERCIAL

## 2025-03-10 DIAGNOSIS — C78.7: Primary | ICD-10-CM

## 2025-03-10 DIAGNOSIS — C18.9: Primary | ICD-10-CM

## 2025-03-10 RX ORDER — ONDANSETRON HYDROCHLORIDE 8 MG/1
8 TABLET, FILM COATED ORAL EVERY 8 HOURS PRN
Qty: 30 TABLET | Refills: 3 | Status: SHIPPED | OUTPATIENT
Start: 2025-03-10

## 2025-03-10 NOTE — PROGRESS NOTES
[facility] Clinical Note  Initial Patient Education     Introduction  Elsa Castellano is a 74 y.o. female who is on the specialty pharmacy service for management of: Oncology Core.    Elsa Castellano is initiating the following therapy: Capecitabine:  Take 4 tablets (2,000 mg total) by mouth 2 times a day for 14 days on and 7 days off for a 21 day cycle.  Swallow whole with water. Do not crush or cut.     Medication receipt date: 3/10/25  Duration of therapy: Patient/Prescriber Specific- Per BILCAP anticipated 8 cycles of adjuvant Capecitabine    The most recent encounter visit with the referring prescriber Dr. Scottie Castro on 2/17/25 was reviewed.  Pharmacy will continue to collaborate in the care of this patient with the referring prescriber.    Clinical Background  An initial assessment was conducted prior to first fill of the medication to determine the appropriateness of therapy given the patient's diagnosis, medication list, comorbidities, allergies, medical history, patient's ability to self administer medication, and therapeutic goals based on possible outcomes of therapy.    Labs/Procedures for clinical appropriateness that were reviewed include:   Oncology - CBC-diff:   Lab Results   Component Value Date    WBC 7.9 02/26/2025    RBC 4.60 02/26/2025    HGB 13.1 02/26/2025    HCT 47.0 (H) 02/26/2025     (H) 02/26/2025    MCHC 27.9 (L) 02/26/2025     02/26/2025    RDW 17.0 (H) 02/26/2025    NEUTOPHILPCT 51.5 02/26/2025    IGPCT 0.1 02/26/2025    LYMPHOPCT 36.9 02/26/2025    MONOPCT 8.8 02/26/2025    EOSPCT 1.8 02/26/2025    BASOPCT 0.9 02/26/2025    NEUTROABS 4.08 02/26/2025    LYMPHSABS 2.93 02/26/2025    MONOSABS 0.70 02/26/2025    EOSABS 0.14 02/26/2025    BASOSABS 0.07 02/26/2025    and CMP:   Lab Results   Component Value Date    GLUCOSE 110 (H) 02/26/2025     02/26/2025    K 5.1 02/26/2025     02/26/2025    CO2 27 02/26/2025    ANIONGAP 12  02/26/2025    BUN 13 02/26/2025    CREATININE 0.93 02/26/2025    CALCIUM 10.4 (H) 02/26/2025    ALBUMIN 4.5 02/26/2025    ALKPHOS 48 02/26/2025    PROT 7.1 02/26/2025    AST 20 02/26/2025    BILITOT 0.5 02/26/2025    ALT 15 02/26/2025       Education/Discussion  Elsa was contacted on 3/10/2025 at 2:10 PM for a pharmacy visit with encounter number [id number] from:   [facility]  [address]  [address]  [facility] [address]  Dept: [phone]  Loc: [phone]  Elsa consented to a/an Telephone visit, which was performed.    Medication Start Date (planned or actual): 3/11/25  Education was conducted prior to start of therapy? Yes    Education discussed includes the following:  Patient Education  Counseled the Patient on the Following : Doses and administration, Adherence and missed doses, Possible side effects and management, Possible drug interactions, Lab monitoring and follow-up, Safe handling, storage, and disposal  Learner: Patient  Education Method: Explanation, Handout  Education Response: Verbalizes understanding  Additional details of the medication specific counseling are found within the linked patient education flowsheet.     The follow up timeline was discussed. Every person responds to and reacts to therapy differently. Patient should be assessed for efficacy and tolerability in approximately: 8-12 weeks    Provided education on goals and possible outcomes of therapy:  Adherence with therapy  Timely completion of appropriate labs  Timely and appropriate follow up with provider  Identify and address medication interactions with presciption medications, OTC medications and supplements  Optimize or maintain quality of life  Oncology: Curative with treatment  Manage side effects (ex: nausea/vomiting, constipation, fatigue) in conjunction with care team    The importance of adherence was discussed and they were advised to take the medication as  prescribed by their provider.     Impression/Plan  Review and Assessment   Reviewed During This Encounter: Medications  Medications Assessed for Appropriate Use, Dose, Route, Frequency, and Duration: Yes  Medication Reconciliation Completed: Yes  Drug Interactions Evaluated: Yes  Clinically Relevant Drug Interactions Identified: No    This patient has been identified as high risk due to Geriatric (over 65 years of age).  The following action was taken: N/A.         The  Specialty Pharmacy Welcome packet may be viewed here:   Specialty Pharmacy Welcome Packet     Or by scanning QR code:      Provided contact information ([phone]) for [facility] and reviewed dispensing process, refill timeline and patient management follow up. Advised to contact the pharmacy if there are any adverse effects and/or changes to medication list, including prescriptions, OTC medications, herbal products, or supplements. Confirmed understanding of education conducted during assessment. All questions and concerns were addressed and patient was encouraged to reach out for additional questions or concerns.      Manuela Mendiola, ChrisD

## 2025-03-13 ENCOUNTER — LAB (OUTPATIENT)
Dept: LAB | Facility: CLINIC | Age: 75
End: 2025-03-13
Payer: MEDICARE

## 2025-03-13 DIAGNOSIS — C22.1 CHOLANGIOCARCINOMA (MULTI): ICD-10-CM

## 2025-03-13 LAB
ALBUMIN SERPL BCP-MCNC: 4.5 G/DL (ref 3.4–5)
ALP SERPL-CCNC: 42 U/L (ref 33–136)
ALT SERPL W P-5'-P-CCNC: 13 U/L (ref 7–45)
ANION GAP SERPL CALC-SCNC: 14 MMOL/L (ref 10–20)
AST SERPL W P-5'-P-CCNC: 18 U/L (ref 9–39)
BASOPHILS # BLD AUTO: 0.05 X10*3/UL (ref 0–0.1)
BASOPHILS NFR BLD AUTO: 0.7 %
BILIRUB SERPL-MCNC: 0.6 MG/DL (ref 0–1.2)
BUN SERPL-MCNC: 14 MG/DL (ref 6–23)
CALCIUM SERPL-MCNC: 9.7 MG/DL (ref 8.6–10.3)
CANCER AG19-9 SERPL-ACNC: 37.96 U/ML
CHLORIDE SERPL-SCNC: 103 MMOL/L (ref 98–107)
CO2 SERPL-SCNC: 23 MMOL/L (ref 21–32)
CREAT SERPL-MCNC: 0.79 MG/DL (ref 0.5–1.05)
EGFRCR SERPLBLD CKD-EPI 2021: 78 ML/MIN/1.73M*2
EOSINOPHIL # BLD AUTO: 0.19 X10*3/UL (ref 0–0.4)
EOSINOPHIL NFR BLD AUTO: 2.7 %
ERYTHROCYTE [DISTWIDTH] IN BLOOD BY AUTOMATED COUNT: 16.3 % (ref 11.5–14.5)
GLUCOSE SERPL-MCNC: 90 MG/DL (ref 74–99)
HCT VFR BLD AUTO: 43.8 % (ref 36–46)
HGB BLD-MCNC: 13.6 G/DL (ref 12–16)
IMM GRANULOCYTES # BLD AUTO: 0.02 X10*3/UL (ref 0–0.5)
IMM GRANULOCYTES NFR BLD AUTO: 0.3 % (ref 0–0.9)
LYMPHOCYTES # BLD AUTO: 2.79 X10*3/UL (ref 0.8–3)
LYMPHOCYTES NFR BLD AUTO: 39.5 %
MCH RBC QN AUTO: 28.2 PG (ref 26–34)
MCHC RBC AUTO-ENTMCNC: 31.1 G/DL (ref 32–36)
MCV RBC AUTO: 91 FL (ref 80–100)
MONOCYTES # BLD AUTO: 0.65 X10*3/UL (ref 0.05–0.8)
MONOCYTES NFR BLD AUTO: 9.2 %
NEUTROPHILS # BLD AUTO: 3.37 X10*3/UL (ref 1.6–5.5)
NEUTROPHILS NFR BLD AUTO: 47.6 %
NRBC BLD-RTO: 0 /100 WBCS (ref 0–0)
PLATELET # BLD AUTO: 182 X10*3/UL (ref 150–450)
POTASSIUM SERPL-SCNC: 4.1 MMOL/L (ref 3.5–5.3)
PROT SERPL-MCNC: 7.4 G/DL (ref 6.4–8.2)
RBC # BLD AUTO: 4.82 X10*6/UL (ref 4–5.2)
SODIUM SERPL-SCNC: 136 MMOL/L (ref 136–145)
WBC # BLD AUTO: 7.1 X10*3/UL (ref 4.4–11.3)

## 2025-03-13 PROCEDURE — 86301 IMMUNOASSAY TUMOR CA 19-9: CPT

## 2025-03-13 PROCEDURE — 80053 COMPREHEN METABOLIC PANEL: CPT

## 2025-03-13 PROCEDURE — 36415 COLL VENOUS BLD VENIPUNCTURE: CPT

## 2025-03-13 PROCEDURE — 85025 COMPLETE CBC W/AUTO DIFF WBC: CPT

## 2025-03-16 NOTE — PROGRESS NOTES
"Patient ID: Elsa Castellano is a 74 y.o. female.    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 Pathology showing 5cm, moderately differentiated adenocarcinoma with negative margins. Patient started adjuvant capecitabine on March 11, 2025. She is tolerating this well without any issues.     Re. Patient's last CT, suspect the new the new left upper quadrant nodule is likely a postoperative change. A short follow up scan is ordered to be performed in the next month.     Plan: follow up March 31, 2025 prior to cycle 2    I have placed all orders as outlined above. I advised the patient to schedule the tests and follow-up appointment as discussed by contacting the  on the way out or calling by phone.    Providers:  Surgeon: Dr. Josephine Romeroc:  RadOnc:    Chief complaint: Liver lesion    HPI: 73 year old female with a history of pre-DM, HLD, HTN, CAD s/p MI, COPD, congenital single kidney, who presented as a new patient with adenocarcinoma of the liver.    Patient notes that in 2022 she had a \"small heart attack\" and a CT done on 8/28/22 demonstrated a lateral segment 2 lesion measuring 2.6x2cm. A follow up MRI liver performed on 9/15/23 showed this lesion was 2.8cm and read as \"most compatible with adenoma\". A follow up MRI liver perfromed on 9/9/24 demonstrated this lesion had grown to 4.x2x2.29cm. This was biopsied on 10/2/24 showing moderately differentiated adenocarcinoma involving the liver.      47. Staging CT CAP with 4.3x3x3.7cm hepatic segment 2/3 mass and a 2.6 right adnexal cyst.    1/3/25: hepatectomy and " portal lymphadenectomy - pT1aN0, negative margins, no perineural or LVI     February 14, 2024: CT scan of chest abdomen pelvis showed the following-  IMPRESSION:  Cholangiocarcinoma restaging scan. When compared to the prior  examination dated 10/30/2024, there has been interval postsurgical  changes of a partial hepatectomy. New soft tissue nodule in the left upper quadrant and upper abdomen anterior peritoneum, short-term follow-up scan in 4-6 weeks recommended this could represent truer ecurrence versus evolving postoperative changes. In additionc orrelation with serum tumor markers can also be considered. New phlegmonous tissue and fluid in the left upper quadrant likely related to recent surgery. Additional stable chronic and incidental findings described above.    3/11/25: started C1 capecitabine     Interval history:  Patient started capecitabine. She has no complaints. Appetite and energy are good. She denies any fevers, chills, chest pain, dyspnea, cough, n/v/c/d, rash, mouth sores, hand/foot pain/redness.     Past Medical History:   Past Medical History:  08/28/2022: Acute non-ST segment elevation myocardial infarction   (Multi)  No date: AMI (acute myocardial infarction) (Multi)  12/26/2023: Anxiety  No date: At risk for falling  09/08/2023: Chest pain  09/08/2023: Chronic obstructive pulmonary disease (Multi)  09/08/2023: Elevated liver enzymes  No date: H/O abdominal surgery  No date: Hepatic adenocarcinoma (Multi)  08/14/2023: Hepatomegaly  12/30/2023: History of hypertension  09/08/2023: Hyperlipidemia  No date: Hypertension  09/08/2023: Macrocytosis without anemia  No date: Obesity  09/08/2023: Palpitations  01/27/2023: Prediabetes  12/30/2023: Rhinitis  09/08/2023: Shortness of breath  No date: Weakness   Surgical History:    Past Surgical History:   Procedure Laterality Date    APPENDECTOMY      CARDIAC CATHETERIZATION  2022    x2    CATARACT EXTRACTION Bilateral 2014    COLONOSCOPY  2008     COLONOSCOPY  2013    COLONOSCOPY  2018    DILATION AND CURETTAGE OF UTERUS  2012    VAGINA SURGERY  1974      Family History:    Family History   Problem Relation Name Age of Onset    No Known Problems Mother      Stroke Father      Heart attack Father      No Known Problems Brother      Breast cancer Father's Sister      Breast cancer Paternal Grandmother      Hypertension Other      Diabetes Other       Family Oncology History:    Cancer-related family history includes Breast cancer in her father's sister and paternal grandmother.  Social History:    Social History     Tobacco Use    Smoking status: Every Day     Types: Cigarettes     Passive exposure: Past    Smokeless tobacco: Never   Vaping Use    Vaping status: Never Used   Substance Use Topics    Alcohol use: Yes     Alcohol/week: 10.0 standard drinks of alcohol     Types: 10 Standard drinks or equivalent per week    Drug use: Never        Allergies  Allergies   Allergen Reactions    Penicillins Unknown, Anaphylaxis and Swelling    Hydrochlorothiazide Dizziness        Medications  Current Outpatient Medications   Medication Instructions    albuterol (Proventil HFA) 90 mcg/actuation inhaler 2 puffs, Every 6 hours PRN    aspirin (Adult Low Dose Aspirin) 81 mg EC tablet 1 tablet, Daily    atorvastatin (LIPITOR) 40 mg, oral, Daily    capecitabine (Xeloda) 500 mg tablet Take 4 tablets (2,000 mg total) by mouth 2 times a day for 14 days on and 7 days off for a 21 day cycle.  Swallow whole with water. Do not crush or cut.    melatonin 10 mg tablet Daily    metoprolol succinate XL (TOPROL-XL) 50 mg, oral, Daily    Mounjaro 5 mg, abdominal subcutaneous, Weekly    ondansetron (ZOFRAN) 8 mg, oral, Every 8 hours PRN    polyethylene glycol (GLYCOLAX, MIRALAX) 17 g, Daily    umeclidinium (Incruse Ellipta) 62.5 mcg/actuation inhalation 1 puff, Daily          Objective   VS: There were no vitals taken for this visit.  Weight:   There were no vitals filed for this visit.        PHYSICAL EXAMINATION  ECOG performance status- 1  VS:  There were no vitals taken for this visit.  Weight:   There were no vitals filed for this visit.      BSA: There is no height or weight on file to calculate BSA.   Pain Scale: 0    Constitutional: Awake/alert/oriented x3, cooperative and answers questions appropriately.     Eyes: No pallor, conjunctival injection, clear sclera.    Mouth: No ulceration. No thrush.     Head/Neck: Neck supple, no apparent injury, thyroid without mass or tenderness, No JVD, trachea midline, no bruits.     Respiratory/Thorax: Normal breath sounds with bilaterally symmetrical chest expansion. No dullness.      Cardiovascular: No audible murmurs, normal heart sounds. No pericardial rub.     Gastrointestinal: Nondistended, soft, non-tender, no rebound tenderness or guarding. Healing surgical incision.     Musculoskeletal: No joint swelling, redness.      Extremities: normal extremities, no cyanosis edema, contusions or wounds, no clubbing.     Lymphatic: No significant lymphadenopathy.     Skin: Warm and dry, no lesions, no rashes.     Labs  10/21/24:  47, CEA 1.7, AFP 5    Path  12/3/25  A. Peritoneum, Biopsy:   -- Benign fibroadipose tissue, negative for carcinoma.     B. Liver, Partial Hepatectomy:   -- Moderately differentiated adenocarcinoma, 5 cm, consistent with intrahepatic cholangiocarcinoma, small duct type, with adjacent hepatic parenchyma showing prior procedure related changes.  -- No lymphovascular or perineural invasion identified.  -- Hepatic parenchymal margin, negative for carcinoma.  -- Background liver with:  -- Steatohepatitis with moderate steatosis (40-50%), ballooned hepatocytes, and Jayna-Denk bodies.   -- Delicate zone 3 perisinusoidal fibrosis (trichrome/reticulin stains).  -- Moderate stainable iron in periportal hepatocytes (iron stain).  -- No evidence to support alpha-1-antitrypsin deficiency on PAS-D stain.  -- See note and synoptic report.      C. Lymph Node, Hepatoportal, Excision:   -- Six lymph nodes, negative for carcinoma (0/6).     Note: Rare foci of dysplastic biliary epithelium are identified within the intraductal location, which may represent a precursor lesion / high-grade intraepithelial neoplasia, or cancerization of the duct.    Image  10/30/24 CT CAP   IMPRESSION:  Liver lesion of unknown primary:  1. When compared to the 2023 MRI and CT there has been slight interval increase in size of the left hepatic lobe lesion consistent with patient's biopsy-proven adenocarcinoma. No significant change when compared with the recent MRI dated 09/09/2024. No definite sites of metastatic disease elsewhere.  2. Diffuse hepatic steatosis, correlate with liver function tests.  3. Cystic lesion of the right adnexa measuring 2.6 cm, further evaluation with a pelvic ultrasound is recommended.  4. Additional stable chronic and incidental findings described above.     9/9/24 MRI Liver  IMPRESSION:  1. Diffuse hepatic steatosis with lateral segment 2 hepatic  progressively enhancing solid lesion measuring currently 4.2 x 2.9 cm. This lesion has significantly increased in size since CT scan dated 08/28/2022 as it was measuring 2.6 x 2.0 cm. The growth, patient's age/demographic coupled with the absence of classic signal intensity of benign hepatic lesions including hemangioma would raise  the concern for malignant process including mass forming  cholangiocarcinoma or alternatively metastatic lesions.  2. 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 No duct dilatation. Please see below guidelines for suggested follow-up  3. Known intestinal malrotation without obstruction    This note was created using a voice recognition system ( the Dragon dictation system). Inaccuracies and misspellings are unintentional.       Scottie Castro MD.

## 2025-03-17 ENCOUNTER — OFFICE VISIT (OUTPATIENT)
Dept: HEMATOLOGY/ONCOLOGY | Facility: CLINIC | Age: 75
End: 2025-03-17
Payer: MEDICARE

## 2025-03-17 VITALS
BODY MASS INDEX: 28.24 KG/M2 | RESPIRATION RATE: 18 BRPM | OXYGEN SATURATION: 97 % | WEIGHT: 180.34 LBS | SYSTOLIC BLOOD PRESSURE: 129 MMHG | DIASTOLIC BLOOD PRESSURE: 70 MMHG | HEART RATE: 74 BPM | TEMPERATURE: 96.6 F

## 2025-03-17 DIAGNOSIS — C24.0: Primary | ICD-10-CM

## 2025-03-17 PROCEDURE — 99214 OFFICE O/P EST MOD 30 MIN: CPT | Performed by: INTERNAL MEDICINE

## 2025-03-17 PROCEDURE — 1157F ADVNC CARE PLAN IN RCRD: CPT | Performed by: INTERNAL MEDICINE

## 2025-03-17 PROCEDURE — 1126F AMNT PAIN NOTED NONE PRSNT: CPT | Performed by: INTERNAL MEDICINE

## 2025-03-17 PROCEDURE — 1159F MED LIST DOCD IN RCRD: CPT | Performed by: INTERNAL MEDICINE

## 2025-03-17 ASSESSMENT — PAIN SCALES - GENERAL: PAINLEVEL_OUTOF10: 0-NO PAIN

## 2025-03-17 NOTE — PROGRESS NOTES
Patient ambulated into clinic for follow up with Dr. Castro accompanied by her . Medications and allergies reviewed.     Denies nausea, vomiting, constipation and diarrhea.   No hand- foot syndrome present.   Appetite has been good.     Oral chemo calendar provided to patient.     Follow up in 2 weeks.

## 2025-03-18 ENCOUNTER — SPECIALTY PHARMACY (OUTPATIENT)
Dept: PHARMACY | Facility: CLINIC | Age: 75
End: 2025-03-18

## 2025-03-18 ENCOUNTER — TELEPHONE (OUTPATIENT)
Dept: PRIMARY CARE | Facility: CLINIC | Age: 75
End: 2025-03-18
Payer: COMMERCIAL

## 2025-03-20 PROCEDURE — RXMED WILLOW AMBULATORY MEDICATION CHARGE

## 2025-03-21 ENCOUNTER — PHARMACY VISIT (OUTPATIENT)
Dept: PHARMACY | Facility: CLINIC | Age: 75
End: 2025-03-21
Payer: COMMERCIAL

## 2025-03-21 ENCOUNTER — SPECIALTY PHARMACY (OUTPATIENT)
Dept: PHARMACY | Facility: CLINIC | Age: 75
End: 2025-03-21

## 2025-03-21 ENCOUNTER — TELEPHONE (OUTPATIENT)
Dept: HEMATOLOGY/ONCOLOGY | Facility: CLINIC | Age: 75
End: 2025-03-21
Payer: COMMERCIAL

## 2025-03-21 NOTE — TELEPHONE ENCOUNTER
Called and spoke with patient. Patient states that she has 23 pills left and is 5 pills short for her cycle. She has been taking Xeloda as prescribed (4 pills in the morning and 4 pills in the evening). Advised patient to call pharmacy to see if they could send the 5 pills she is short overnight, I will also reach out to the pharmacy to see if that is possible. If not, she will have to finish her cycle 5 pills short and I will notify Dr. Castro. Patient agreed to plan and verbalized understanding using the teach back method. No further questions at this time.

## 2025-03-25 ENCOUNTER — SPECIALTY PHARMACY (OUTPATIENT)
Dept: HEMATOLOGY/ONCOLOGY | Facility: HOSPITAL | Age: 75
End: 2025-03-25
Payer: COMMERCIAL

## 2025-03-25 NOTE — PROGRESS NOTES
[facility] Clinical Note  Patient Reassessment     Introduction  Elsa Castellano is a 74 y.o. female who is on the specialty pharmacy service for management of: Oncology Core.      [facility] supplied medication: Capecitabine    Duration of therapy: Patient/Prescriber Specific- 8 cycles    The most recent encounter visit with the referring prescriber Dr. Scottie Castro on 3/17/25 was reviewed.  Pharmacy will continue to collaborate in the care of this patient with the referring prescriber.    Discussion  Elsa was contacted on 3/25/2025 at 11:21 AM for a pharmacy visit with encounter number [phone] from:   [facility]  [address]  [address]  [facility] [address]  Dept: [phone]  Loc: [phone]  Elsa consented to a/an Telephone visit, which was performed.    Efficacy  Patient has developed new symptoms of condition: No  Patient/caregiver feels medication is affecting the disease state: Yes    Goals  Provided education on goals and possible outcomes of therapy:  Adherence with therapy  Timely completion of appropriate labs  Timely and appropriate follow up with provider  Identify and address medication interactions with presciption medications, OTC medications and supplements  Optimize or maintain quality of life  Oncology: Curative with treatment  Manage side effects (ex: nausea/vomiting, constipation, fatigue) in conjunction with care team  Patient has documented target(s) for goals of therapy: No    Tolerance  Patient has experienced side effects from this medication: No  Changes to current therapy regimen: No    The follow-up timeline was discussed. Every person responds to and reacts to therapy differently. Patient should be assessed for efficacy and tolerability in approximately: 8-12 weeks    Adherence  Patient Information  Informant: Self (Patient)  Demonstrates Understanding of Importance of Adherence: Yes  Does the  patient have any barriers to self-administration (including physical and mental?): No  Medication Information  Medication: capecitabine (Xeloda)  Patient Reported Missed Doses in the Last 4 Weeks: 0  Estimated Medication Adherence Level: Good  Barriers to Adherence: No Problems identified   The importance of adherence was discussed and patient/caregiver was advised to take the medication as prescribed by their provider. Encouraged patient/caregiver to call physician's office or specialty pharmacy if they have a question regarding a missed dose.    General Assessment  Changes to home medications, OTCs or supplements: No  Current Outpatient Medications   Medication Sig Dispense Refill    albuterol (Proventil HFA) 90 mcg/actuation inhaler Inhale 2 puffs every 6 hours if needed for wheezing.      aspirin (Adult Low Dose Aspirin) 81 mg EC tablet Take 1 tablet (81 mg) by mouth once daily.      atorvastatin (Lipitor) 40 mg tablet Take 1 tablet (40 mg) by mouth once daily. 90 tablet 3    capecitabine (Xeloda) 500 mg tablet Take 4 tablets (2,000 mg total) by mouth 2 times a day for 14 days on and 7 days off for a 21 day cycle.  Swallow whole with water. Do not crush or cut. 112 tablet 7    melatonin 10 mg tablet Take by mouth once daily.      metoprolol succinate XL (Toprol-XL) 50 mg 24 hr tablet Take 1 tablet (50 mg) by mouth once daily. 90 tablet 3    ondansetron (Zofran) 8 mg tablet Take 1 tablet (8 mg) by mouth every 8 hours if needed for nausea or vomiting. 30 tablet 3    polyethylene glycol (Glycolax, Miralax) 17 gram packet Take 17 g by mouth once daily.      tirzepatide (Mounjaro) 5 mg/0.5 mL pen injector 5 mg by abdominal subcutaneous route 1 (one) time per week. 8 mL 3    umeclidinium (Incruse Ellipta) 62.5 mcg/actuation inhalation Inhale 1 puff (62.5 mcg) once daily.       No current facility-administered medications for this visit.     Reported new allergies: No  Reported new medical conditions: No  Additional  monitoring reviewed: Oncology - CBC-diff:   Lab Results   Component Value Date    WBC 7.1 03/13/2025    RBC 4.82 03/13/2025    HGB 13.6 03/13/2025    HCT 43.8 03/13/2025    MCV 91 03/13/2025    MCHC 31.1 (L) 03/13/2025     03/13/2025    RDW 16.3 (H) 03/13/2025    NEUTOPHILPCT 47.6 03/13/2025    IGPCT 0.3 03/13/2025    LYMPHOPCT 39.5 03/13/2025    MONOPCT 9.2 03/13/2025    EOSPCT 2.7 03/13/2025    BASOPCT 0.7 03/13/2025    NEUTROABS 3.37 03/13/2025    LYMPHSABS 2.79 03/13/2025    MONOSABS 0.65 03/13/2025    EOSABS 0.19 03/13/2025    BASOSABS 0.05 03/13/2025    and CMP:   Lab Results   Component Value Date    GLUCOSE 90 03/13/2025     03/13/2025    K 4.1 03/13/2025     03/13/2025    CO2 23 03/13/2025    ANIONGAP 14 03/13/2025    BUN 14 03/13/2025    CREATININE 0.79 03/13/2025    CALCIUM 9.7 03/13/2025    ALBUMIN 4.5 03/13/2025    ALKPHOS 42 03/13/2025    PROT 7.4 03/13/2025    AST 18 03/13/2025    BILITOT 0.6 03/13/2025    ALT 13 03/13/2025     Is laboratory follow up needed? No    Advised to contact the pharmacy if there are any changes to the patient's medication list, including prescriptions, OTC medications, herbal products, or supplements.    Impression/Plan  This patient has been identified as high risk due to Geriatric (over 65 years of age).  The following action was taken: N/A.         Provided contact information ([phone]) for [facility] and reviewed dispensing process, refill timeline and patient management follow up. Confirmed understanding of education conducted during assessment. All questions and concerns were addressed and patient/caregiver was encouraged to reach out for additional questions or concerns.    Based on the patient's diagnosis, medication list, progress towards goals, adherence, tolerance, and medication list, medication remains appropriate: Therapy remains appropriate (I attest)    Chris PayneD

## 2025-03-31 ENCOUNTER — HOSPITAL ENCOUNTER (OUTPATIENT)
Dept: RADIOLOGY | Facility: CLINIC | Age: 75
Discharge: HOME | End: 2025-03-31
Payer: MEDICARE

## 2025-03-31 ENCOUNTER — OFFICE VISIT (OUTPATIENT)
Dept: HEMATOLOGY/ONCOLOGY | Facility: CLINIC | Age: 75
End: 2025-03-31
Payer: MEDICARE

## 2025-03-31 VITALS — BODY MASS INDEX: 28.25 KG/M2 | HEIGHT: 67 IN | WEIGHT: 180 LBS

## 2025-03-31 VITALS
DIASTOLIC BLOOD PRESSURE: 67 MMHG | TEMPERATURE: 96.8 F | HEART RATE: 76 BPM | SYSTOLIC BLOOD PRESSURE: 120 MMHG | OXYGEN SATURATION: 95 % | RESPIRATION RATE: 18 BRPM | BODY MASS INDEX: 27.62 KG/M2 | WEIGHT: 176.37 LBS

## 2025-03-31 DIAGNOSIS — C24.0: Primary | ICD-10-CM

## 2025-03-31 DIAGNOSIS — Z12.31 OTHER SCREENING MAMMOGRAM: ICD-10-CM

## 2025-03-31 LAB
ALBUMIN SERPL BCP-MCNC: 4.5 G/DL (ref 3.4–5)
ALP SERPL-CCNC: 48 U/L (ref 33–136)
ALT SERPL W P-5'-P-CCNC: 12 U/L (ref 7–45)
ANION GAP SERPL CALC-SCNC: 15 MMOL/L (ref 10–20)
AST SERPL W P-5'-P-CCNC: 17 U/L (ref 9–39)
BASOPHILS # BLD AUTO: 0.03 X10*3/UL (ref 0–0.1)
BASOPHILS NFR BLD AUTO: 0.4 %
BILIRUB SERPL-MCNC: 0.6 MG/DL (ref 0–1.2)
BUN SERPL-MCNC: 13 MG/DL (ref 6–23)
CALCIUM SERPL-MCNC: 9.9 MG/DL (ref 8.6–10.3)
CHLORIDE SERPL-SCNC: 105 MMOL/L (ref 98–107)
CO2 SERPL-SCNC: 22 MMOL/L (ref 21–32)
CREAT SERPL-MCNC: 0.77 MG/DL (ref 0.5–1.05)
EGFRCR SERPLBLD CKD-EPI 2021: 81 ML/MIN/1.73M*2
EOSINOPHIL # BLD AUTO: 0.31 X10*3/UL (ref 0–0.4)
EOSINOPHIL NFR BLD AUTO: 3.7 %
ERYTHROCYTE [DISTWIDTH] IN BLOOD BY AUTOMATED COUNT: 18 % (ref 11.5–14.5)
GLUCOSE SERPL-MCNC: 108 MG/DL (ref 74–99)
HCT VFR BLD AUTO: 38.1 % (ref 36–46)
HGB BLD-MCNC: 12.8 G/DL (ref 12–16)
IMM GRANULOCYTES # BLD AUTO: 0.02 X10*3/UL (ref 0–0.5)
IMM GRANULOCYTES NFR BLD AUTO: 0.2 % (ref 0–0.9)
LYMPHOCYTES # BLD AUTO: 3.1 X10*3/UL (ref 0.8–3)
LYMPHOCYTES NFR BLD AUTO: 36.5 %
MCH RBC QN AUTO: 29.4 PG (ref 26–34)
MCHC RBC AUTO-ENTMCNC: 33.6 G/DL (ref 32–36)
MCV RBC AUTO: 88 FL (ref 80–100)
MONOCYTES # BLD AUTO: 0.8 X10*3/UL (ref 0.05–0.8)
MONOCYTES NFR BLD AUTO: 9.4 %
NEUTROPHILS # BLD AUTO: 4.23 X10*3/UL (ref 1.6–5.5)
NEUTROPHILS NFR BLD AUTO: 49.8 %
NRBC BLD-RTO: 0 /100 WBCS (ref 0–0)
PLATELET # BLD AUTO: 177 X10*3/UL (ref 150–450)
POTASSIUM SERPL-SCNC: 4.1 MMOL/L (ref 3.5–5.3)
PROT SERPL-MCNC: 7.2 G/DL (ref 6.4–8.2)
RBC # BLD AUTO: 4.35 X10*6/UL (ref 4–5.2)
SODIUM SERPL-SCNC: 138 MMOL/L (ref 136–145)
WBC # BLD AUTO: 8.5 X10*3/UL (ref 4.4–11.3)

## 2025-03-31 PROCEDURE — 77067 SCR MAMMO BI INCL CAD: CPT | Performed by: STUDENT IN AN ORGANIZED HEALTH CARE EDUCATION/TRAINING PROGRAM

## 2025-03-31 PROCEDURE — 85025 COMPLETE CBC W/AUTO DIFF WBC: CPT | Performed by: INTERNAL MEDICINE

## 2025-03-31 PROCEDURE — 1157F ADVNC CARE PLAN IN RCRD: CPT | Performed by: INTERNAL MEDICINE

## 2025-03-31 PROCEDURE — 77067 SCR MAMMO BI INCL CAD: CPT

## 2025-03-31 PROCEDURE — 99214 OFFICE O/P EST MOD 30 MIN: CPT | Performed by: INTERNAL MEDICINE

## 2025-03-31 PROCEDURE — 80053 COMPREHEN METABOLIC PANEL: CPT | Performed by: INTERNAL MEDICINE

## 2025-03-31 PROCEDURE — 1159F MED LIST DOCD IN RCRD: CPT | Performed by: INTERNAL MEDICINE

## 2025-03-31 PROCEDURE — 4004F PT TOBACCO SCREEN RCVD TLK: CPT | Performed by: INTERNAL MEDICINE

## 2025-03-31 PROCEDURE — 77063 BREAST TOMOSYNTHESIS BI: CPT | Performed by: STUDENT IN AN ORGANIZED HEALTH CARE EDUCATION/TRAINING PROGRAM

## 2025-03-31 PROCEDURE — 86301 IMMUNOASSAY TUMOR CA 19-9: CPT | Performed by: INTERNAL MEDICINE

## 2025-03-31 PROCEDURE — 1126F AMNT PAIN NOTED NONE PRSNT: CPT | Performed by: INTERNAL MEDICINE

## 2025-03-31 ASSESSMENT — PAIN SCALES - GENERAL: PAINLEVEL_OUTOF10: 0-NO PAIN

## 2025-03-31 NOTE — PROGRESS NOTES
"Patient ID: Elsa Castellano is a 74 y.o. female.    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 Pathology showing 5cm, moderately differentiated adenocarcinoma with negative margins. Patient started adjuvant capecitabine on March 11, 2025. She is tolerating this well without any issues.  I encouraged her to continue capecitabine as prescribed.  She is going to start cycle 2 on April 1, 2025.  I ordered labs today including CBC and CMP to make sure she did not develop any significant life changes after first cycle of capecitabine.    Patient's last CT (February 14, 2025), suspect the new the new left upper quadrant nodule is likely a postoperative change. A short follow up scan is ordered to be performed in late April 2025.       Plan: Phone follow-up on April 17, 2025.      I have placed all orders as outlined above. I advised the patient to schedule the tests and follow-up appointment as discussed by contacting the  on the way out or calling by phone.    Providers:  Surgeon: Dr. Josephine Romeroc:  RadOnc:    Chief complaint: Liver lesion    HPI: 73 year old female with a history of pre-DM, HLD, HTN, CAD s/p MI, COPD, congenital single kidney, who presented as a new patient with adenocarcinoma of the liver.    Patient notes that in 2022 she had a \"small heart attack\" and a CT done on 8/28/22 demonstrated a lateral segment 2 lesion measuring 2.6x2cm. A follow up MRI liver performed on 9/15/23 showed this lesion was 2.8cm and read as \"most compatible with adenoma\". A follow up MRI liver perfromed on 9/9/24 demonstrated this " lesion had grown to 4.x2x2.29cm. This was biopsied on 10/2/24 showing moderately differentiated adenocarcinoma involving the liver.      47. Staging CT CAP with 4.3x3x3.7cm hepatic segment 2/3 mass and a 2.6 right adnexal cyst.    1/3/25: hepatectomy and portal lymphadenectomy - pT1aN0, negative margins, no perineural or LVI     February 14, 2024: CT scan of chest abdomen pelvis showed the following-  IMPRESSION:  Cholangiocarcinoma restaging scan. When compared to the prior  examination dated 10/30/2024, there has been interval postsurgical  changes of a partial hepatectomy. New soft tissue nodule in the left upper quadrant and upper abdomen anterior peritoneum, short-term follow-up scan in 4-6 weeks recommended this could represent truer ecurrence versus evolving postoperative changes. In additionc orrelation with serum tumor markers can also be considered. New phlegmonous tissue and fluid in the left upper quadrant likely related to recent surgery. Additional stable chronic and incidental findings described above.    3/11/25:   started C1 capecitabine (2 gm bid- 2 weeks on and 1 week off).   C2 to start on 4/1/2025.    Interval history:  Patient completed cycle 1 of capecitabine which she tolerated quite well without any significant nausea or vomiting or soreness in the mouth or hand-foot syndrome or diarrhea.  She has no complaints. Appetite and energy are good. She denies any fevers, chills, chest pain, dyspnea, cough, n/v/c/d, rash, mouth sores, hand/foot pain/redness.     Past Medical History:   Past Medical History:  08/28/2022: Acute non-ST segment elevation myocardial infarction   (Multi)  No date: AMI (acute myocardial infarction) (Multi)  12/26/2023: Anxiety  No date: At risk for falling  09/08/2023: Chest pain  09/08/2023: Chronic obstructive pulmonary disease (Multi)  09/08/2023: Elevated liver enzymes  No date: H/O abdominal surgery  No date: Hepatic adenocarcinoma (Multi)  08/14/2023:  Hepatomegaly  11/05/2024: Hiatal hernia  12/30/2023: History of hypertension  09/08/2023: Hyperlipidemia  No date: Hypertension  09/08/2023: Macrocytosis without anemia  No date: Obesity  09/08/2023: Palpitations  01/27/2023: Prediabetes  12/30/2023: Rhinitis  09/08/2023: Shortness of breath  No date: Weakness   Surgical History:    Past Surgical History:   Procedure Laterality Date    APPENDECTOMY  1979    CARDIAC CATHETERIZATION  2022    x2    CATARACT EXTRACTION Bilateral 2014    COLONOSCOPY  2008    COLONOSCOPY  2013    COLONOSCOPY  2018    DILATION AND CURETTAGE OF UTERUS  2012    OTHER SURGICAL HISTORY  1979    repositioning of intestine    VAGINA SURGERY  1974      Family History:    Family History   Problem Relation Name Age of Onset    No Known Problems Mother      Stroke Father Issac Shields Jr/     Heart attack Father Issac Shields Jr/     No Known Problems Brother      Breast cancer Father's Sister      Breast cancer Paternal Grandmother      Hypertension Other      Diabetes Other       Family Oncology History:    Cancer-related family history includes Breast cancer in her father's sister and paternal grandmother.  Social History:    Social History     Tobacco Use    Smoking status: Every Day     Types: Cigarettes     Passive exposure: Past    Smokeless tobacco: Never   Vaping Use    Vaping status: Never Used   Substance Use Topics    Alcohol use: Yes     Alcohol/week: 10.0 standard drinks of alcohol     Types: 10 Standard drinks or equivalent per week    Drug use: Never        Allergies  Allergies   Allergen Reactions    Penicillins Unknown, Anaphylaxis and Swelling    Hydrochlorothiazide Dizziness        Medications  Current Outpatient Medications   Medication Instructions    albuterol (Proventil HFA) 90 mcg/actuation inhaler 2 puffs, Every 6 hours PRN    aspirin (Adult Low Dose Aspirin) 81 mg EC tablet 1 tablet, Daily    atorvastatin (LIPITOR) 40 mg, oral, Daily    capecitabine (Xeloda) 500 mg  tablet Take 4 tablets (2,000 mg total) by mouth 2 times a day for 14 days on and 7 days off for a 21 day cycle.  Swallow whole with water. Do not crush or cut.    melatonin 10 mg tablet Daily    metoprolol succinate XL (TOPROL-XL) 50 mg, oral, Daily    Mounjaro 5 mg, abdominal subcutaneous, Weekly    ondansetron (ZOFRAN) 8 mg, oral, Every 8 hours PRN    polyethylene glycol (GLYCOLAX, MIRALAX) 17 g, Daily    umeclidinium (Incruse Ellipta) 62.5 mcg/actuation inhalation 1 puff, Daily          Objective   VS: /67 (BP Location: Left arm, Patient Position: Sitting, BP Cuff Size: Adult long)   Pulse 76   Temp 36 °C (96.8 °F) (Temporal)   Resp 18   Wt 80 kg (176 lb 5.9 oz)   SpO2 95%   BMI 27.62 kg/m²   Weight:   Vitals:    03/31/25 1455   Weight: 80 kg (176 lb 5.9 oz)          PHYSICAL EXAMINATION  ECOG performance status- 1  VS:  /67 (BP Location: Left arm, Patient Position: Sitting, BP Cuff Size: Adult long)   Pulse 76   Temp 36 °C (96.8 °F) (Temporal)   Resp 18   Wt 80 kg (176 lb 5.9 oz)   SpO2 95%   BMI 27.62 kg/m²   Weight:   Vitals:    03/31/25 1455   Weight: 80 kg (176 lb 5.9 oz)         BSA: 1.94 meters squared   Pain Scale: 0    Constitutional: Awake/alert/oriented x3, cooperative and answers questions appropriately.     Eyes: No pallor, conjunctival injection, clear sclera.    Mouth: No ulceration. No thrush.     Head/Neck: Neck supple, no apparent injury, thyroid without mass or tenderness, No JVD, trachea midline, no bruits.     Respiratory/Thorax: Normal breath sounds with bilaterally symmetrical chest expansion. No dullness.      Cardiovascular: No audible murmurs, normal heart sounds. No pericardial rub.     Gastrointestinal: Nondistended, soft, non-tender, no rebound tenderness or guarding. Healing surgical incision.     Musculoskeletal: No joint swelling, redness.      Extremities: normal extremities, no cyanosis edema, contusions or wounds, no clubbing.     Lymphatic: No  significant lymphadenopathy.     Skin: Warm and dry, no lesions, no rashes.     Labs  10/21/24:  47, CEA 1.7, AFP 5    Path  12/3/25  A. Peritoneum, Biopsy:   -- Benign fibroadipose tissue, negative for carcinoma.     B. Liver, Partial Hepatectomy:   -- Moderately differentiated adenocarcinoma, 5 cm, consistent with intrahepatic cholangiocarcinoma, small duct type, with adjacent hepatic parenchyma showing prior procedure related changes.  -- No lymphovascular or perineural invasion identified.  -- Hepatic parenchymal margin, negative for carcinoma.  -- Background liver with:  -- Steatohepatitis with moderate steatosis (40-50%), ballooned hepatocytes, and Jayna-Denk bodies.   -- Delicate zone 3 perisinusoidal fibrosis (trichrome/reticulin stains).  -- Moderate stainable iron in periportal hepatocytes (iron stain).  -- No evidence to support alpha-1-antitrypsin deficiency on PAS-D stain.  -- See note and synoptic report.     C. Lymph Node, Hepatoportal, Excision:   -- Six lymph nodes, negative for carcinoma (0/6).     Note: Rare foci of dysplastic biliary epithelium are identified within the intraductal location, which may represent a precursor lesion / high-grade intraepithelial neoplasia, or cancerization of the duct.    Image  10/30/24 CT CAP   IMPRESSION:  Liver lesion of unknown primary:  1. When compared to the 2023 MRI and CT there has been slight interval increase in size of the left hepatic lobe lesion consistent with patient's biopsy-proven adenocarcinoma. No significant change when compared with the recent MRI dated 09/09/2024. No definite sites of metastatic disease elsewhere.  2. Diffuse hepatic steatosis, correlate with liver function tests.  3. Cystic lesion of the right adnexa measuring 2.6 cm, further evaluation with a pelvic ultrasound is recommended.  4. Additional stable chronic and incidental findings described above.     9/9/24 MRI Liver  IMPRESSION:  1. Diffuse hepatic steatosis with  lateral segment 2 hepatic  progressively enhancing solid lesion measuring currently 4.2 x 2.9 cm. This lesion has significantly increased in size since CT scan dated 08/28/2022 as it was measuring 2.6 x 2.0 cm. The growth, patient's age/demographic coupled with the absence of classic signal intensity of benign hepatic lesions including hemangioma would raise  the concern for malignant process including mass forming  cholangiocarcinoma or alternatively metastatic lesions.  2. 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 No duct dilatation. Please see below guidelines for suggested follow-up  3. Known intestinal malrotation without obstruction    This note was created using a voice recognition system ( the Dragon dictation system). Inaccuracies and misspellings are unintentional.       Scottie Castro MD.

## 2025-03-31 NOTE — PROGRESS NOTES
Patient ambulated into clinic for follow up with Dr. Castro accompanied by her  Allen. Medications and allergies reviewed.     Reports she has been feeling well.   Denies nausea, vomiting and diarrhea.   Denies constipation.   Denies redness and peeling of her feet.   Denies sores in her mouth.    Bilateral hands red at finger tips.     Labs to be drawn today.     Follow up on 4/17 via telephone.

## 2025-04-01 LAB — CANCER AG19-9 SERPL-ACNC: 70.72 U/ML

## 2025-04-03 ENCOUNTER — TELEPHONE (OUTPATIENT)
Dept: HEMATOLOGY/ONCOLOGY | Facility: CLINIC | Age: 75
End: 2025-04-03
Payer: COMMERCIAL

## 2025-04-14 ENCOUNTER — HOSPITAL ENCOUNTER (OUTPATIENT)
Dept: RADIOLOGY | Facility: CLINIC | Age: 75
Discharge: HOME | End: 2025-04-14
Payer: MEDICARE

## 2025-04-14 DIAGNOSIS — C24.0: ICD-10-CM

## 2025-04-14 PROCEDURE — 2550000001 HC RX 255 CONTRASTS: Performed by: INTERNAL MEDICINE

## 2025-04-14 PROCEDURE — 74177 CT ABD & PELVIS W/CONTRAST: CPT

## 2025-04-14 PROCEDURE — RXMED WILLOW AMBULATORY MEDICATION CHARGE

## 2025-04-14 RX ADMIN — IOHEXOL 75 ML: 350 INJECTION, SOLUTION INTRAVENOUS at 14:36

## 2025-04-16 ENCOUNTER — PHARMACY VISIT (OUTPATIENT)
Dept: PHARMACY | Facility: CLINIC | Age: 75
End: 2025-04-16
Payer: COMMERCIAL

## 2025-04-17 ENCOUNTER — TELEMEDICINE (OUTPATIENT)
Dept: HEMATOLOGY/ONCOLOGY | Facility: HOSPITAL | Age: 75
End: 2025-04-17
Payer: MEDICARE

## 2025-04-17 DIAGNOSIS — C24.0: Primary | ICD-10-CM

## 2025-04-17 PROCEDURE — 1157F ADVNC CARE PLAN IN RCRD: CPT | Performed by: INTERNAL MEDICINE

## 2025-04-17 PROCEDURE — 99213 OFFICE O/P EST LOW 20 MIN: CPT | Performed by: INTERNAL MEDICINE

## 2025-04-17 NOTE — PROGRESS NOTES
"Patient ID: Elsa Castellano is a 74 y.o. female.  This was a virtual visit.  Consent obtained.  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    Plan: Adjuvant capecitabine for 6 months.    Currently she is on adjuvant capecitabine which she has been tolerating well.  Recently her CA 19-9 was going up.  After discussion with Dr. Burton, I ordered a CT scan which was performed on April 14, 2025.  The CT scan was unremarkable.  At this time, I plan to continue with adjuvant capecitabine.  I have sent a note to Dr. Burton asking if an MRI liver would be warranted in the next few months.    Follow up- 5/12/2025 with labs.    I have placed all orders as outlined above. I advised the patient to schedule the tests and follow-up appointment as discussed by contacting the  on the way out or calling by phone.    Providers:  Surgeon: Dr. Burton   MedOnc:  RadOnc:    Chief complaint: Liver lesion    HPI: 73 year old female with a history of pre-DM, HLD, HTN, CAD s/p MI, COPD, congenital single kidney, who presented as a new patient with adenocarcinoma of the liver.    Patient notes that in 2022 she had a \"small heart attack\" and a CT done on 8/28/22 demonstrated a lateral segment 2 lesion measuring 2.6x2cm. A follow up MRI liver performed on 9/15/23 showed this lesion was 2.8cm and read as \"most compatible with adenoma\". A follow up MRI liver perfromed on 9/9/24 demonstrated this lesion had grown to 4.x2x2.29cm. This was biopsied on 10/2/24 showing moderately differentiated adenocarcinoma involving the liver.      47. Staging CT CAP with 4.3x3x3.7cm hepatic segment 2/3 mass and a 2.6 right adnexal " cyst.    1/3/25: hepatectomy and portal lymphadenectomy - pT1aN0, negative margins, no perineural or LVI     February 14, 2024: CT scan of chest abdomen pelvis showed the following-  IMPRESSION:  Cholangiocarcinoma restaging scan. When compared to the prior  examination dated 10/30/2024, there has been interval postsurgical  changes of a partial hepatectomy. New soft tissue nodule in the left upper quadrant and upper abdomen anterior peritoneum, short-term follow-up scan in 4-6 weeks recommended this could represent truer ecurrence versus evolving postoperative changes. In additionc orrelation with serum tumor markers can also be considered. New phlegmonous tissue and fluid in the left upper quadrant likely related to recent surgery. Additional stable chronic and incidental findings described above.    3/11/25: started C1 capecitabine (2 gm bid- 2 weeks on and 1 week off).   C2 to start on 4/1/2025.    April 14, 2025: CT scan does not show any evidence of recurrent cancer.    Interval history:  This was a phone visit.  She has been tolerating capecitabine well.  She has some redness in her hands off-and-on but no pain. Appetite and energy are good. She denies any fevers, chills, chest pain, dyspnea, cough, n/v/c/d, rash, mouth sores, hand/foot pain/redness.     Past Medical History:   Past Medical History:  08/28/2022: Acute non-ST segment elevation myocardial infarction   (Multi)  No date: AMI (acute myocardial infarction) (Multi)  12/26/2023: Anxiety  No date: At risk for falling  09/08/2023: Chest pain  09/08/2023: Chronic obstructive pulmonary disease (Multi)  09/08/2023: Elevated liver enzymes  No date: H/O abdominal surgery  No date: Hepatic adenocarcinoma (Multi)  08/14/2023: Hepatomegaly  11/05/2024: Hiatal hernia  12/30/2023: History of hypertension  09/08/2023: Hyperlipidemia  No date: Hypertension  09/08/2023: Macrocytosis without anemia  No date: Obesity  09/08/2023: Palpitations  01/27/2023:  Prediabetes  12/30/2023: Rhinitis  09/08/2023: Shortness of breath  No date: Weakness   Surgical History:    Past Surgical History:   Procedure Laterality Date    APPENDECTOMY  1979    CARDIAC CATHETERIZATION  2022    x2    CATARACT EXTRACTION Bilateral 2014    COLONOSCOPY  2008    COLONOSCOPY  2013    COLONOSCOPY  2018    DILATION AND CURETTAGE OF UTERUS  2012    OTHER SURGICAL HISTORY  1979    repositioning of intestine    VAGINA SURGERY  1974      Family History:    Family History   Problem Relation Name Age of Onset    No Known Problems Mother      Stroke Father Issac Shields Jr/     Heart attack Father Issac Shields Jr/     No Known Problems Brother      Breast cancer Father's Sister      Breast cancer Paternal Grandmother      Hypertension Other      Diabetes Other       Family Oncology History:    Cancer-related family history includes Breast cancer in her father's sister and paternal grandmother.  Social History:    Social History     Tobacco Use    Smoking status: Every Day     Types: Cigarettes     Passive exposure: Past    Smokeless tobacco: Never   Vaping Use    Vaping status: Never Used   Substance Use Topics    Alcohol use: Yes     Alcohol/week: 10.0 standard drinks of alcohol     Types: 10 Standard drinks or equivalent per week    Drug use: Never        Allergies  Allergies   Allergen Reactions    Penicillins Unknown, Anaphylaxis and Swelling    Hydrochlorothiazide Dizziness        Medications  Current Outpatient Medications   Medication Instructions    albuterol (Proventil HFA) 90 mcg/actuation inhaler 2 puffs, Every 6 hours PRN    aspirin (Adult Low Dose Aspirin) 81 mg EC tablet 1 tablet, Daily    atorvastatin (LIPITOR) 40 mg, oral, Daily    capecitabine (Xeloda) 500 mg tablet Take 4 tablets (2,000 mg total) by mouth 2 times a day for 14 days on and 7 days off for a 21 day cycle.  Swallow whole with water. Do not crush or cut.    melatonin 10 mg tablet Daily    metoprolol succinate XL (TOPROL-XL)  50 mg, oral, Daily    Mounjaro 5 mg, abdominal subcutaneous, Weekly    ondansetron (ZOFRAN) 8 mg, oral, Every 8 hours PRN    polyethylene glycol (GLYCOLAX, MIRALAX) 17 g, Daily    umeclidinium (Incruse Ellipta) 62.5 mcg/actuation inhalation 1 puff, Daily          Objective   VS: There were no vitals taken for this visit.  Weight:   There were no vitals filed for this visit.         PHYSICAL EXAMINATION  ECOG performance status- 1  VS:  There were no vitals taken for this visit.  Weight:   There were no vitals filed for this visit.        BSA: There is no height or weight on file to calculate BSA.   Pain Scale: 0      Labs  10/21/24:  47, CEA 1.7, AFP 5    Path  12/3/25  A. Peritoneum, Biopsy:   -- Benign fibroadipose tissue, negative for carcinoma.     B. Liver, Partial Hepatectomy:   -- Moderately differentiated adenocarcinoma, 5 cm, consistent with intrahepatic cholangiocarcinoma, small duct type, with adjacent hepatic parenchyma showing prior procedure related changes.  -- No lymphovascular or perineural invasion identified.  -- Hepatic parenchymal margin, negative for carcinoma.  -- Background liver with:  -- Steatohepatitis with moderate steatosis (40-50%), ballooned hepatocytes, and Jayna-Denk bodies.   -- Delicate zone 3 perisinusoidal fibrosis (trichrome/reticulin stains).  -- Moderate stainable iron in periportal hepatocytes (iron stain).  -- No evidence to support alpha-1-antitrypsin deficiency on PAS-D stain.  -- See note and synoptic report.     C. Lymph Node, Hepatoportal, Excision:   -- Six lymph nodes, negative for carcinoma (0/6).     Note: Rare foci of dysplastic biliary epithelium are identified within the intraductal location, which may represent a precursor lesion / high-grade intraepithelial neoplasia, or cancerization of the duct.    Image  10/30/24 CT CAP   IMPRESSION:  Liver lesion of unknown primary:  1. When compared to the 2023 MRI and CT there has been slight interval increase in  size of the left hepatic lobe lesion consistent with patient's biopsy-proven adenocarcinoma. No significant change when compared with the recent MRI dated 09/09/2024. No definite sites of metastatic disease elsewhere.  2. Diffuse hepatic steatosis, correlate with liver function tests.  3. Cystic lesion of the right adnexa measuring 2.6 cm, further evaluation with a pelvic ultrasound is recommended.  4. Additional stable chronic and incidental findings described above.     9/9/24 MRI Liver  IMPRESSION:  1. Diffuse hepatic steatosis with lateral segment 2 hepatic  progressively enhancing solid lesion measuring currently 4.2 x 2.9 cm. This lesion has significantly increased in size since CT scan dated 08/28/2022 as it was measuring 2.6 x 2.0 cm. The growth, patient's age/demographic coupled with the absence of classic signal intensity of benign hepatic lesions including hemangioma would raise  the concern for malignant process including mass forming  cholangiocarcinoma or alternatively metastatic lesions.  2. 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 No duct dilatation. Please see below guidelines for suggested follow-up  3. Known intestinal malrotation without obstruction    This note was created using a voice recognition system ( the Dragon dictation system). Inaccuracies and misspellings are unintentional.   Total time 20 minutes.    Scottie Castro MD.

## 2025-04-18 ENCOUNTER — APPOINTMENT (OUTPATIENT)
Dept: RADIOLOGY | Facility: CLINIC | Age: 75
End: 2025-04-18
Payer: COMMERCIAL

## 2025-05-02 PROCEDURE — RXMED WILLOW AMBULATORY MEDICATION CHARGE

## 2025-05-05 ENCOUNTER — SPECIALTY PHARMACY (OUTPATIENT)
Dept: PHARMACY | Facility: CLINIC | Age: 75
End: 2025-05-05

## 2025-05-08 ENCOUNTER — LAB (OUTPATIENT)
Dept: LAB | Facility: CLINIC | Age: 75
End: 2025-05-08
Payer: MEDICARE

## 2025-05-08 DIAGNOSIS — C24.0: ICD-10-CM

## 2025-05-08 LAB
ALBUMIN SERPL BCP-MCNC: 4.5 G/DL (ref 3.4–5)
ALP SERPL-CCNC: 51 U/L (ref 33–136)
ALT SERPL W P-5'-P-CCNC: 12 U/L (ref 7–45)
ANION GAP SERPL CALC-SCNC: 14 MMOL/L (ref 10–20)
AST SERPL W P-5'-P-CCNC: 19 U/L (ref 9–39)
BASOPHILS # BLD AUTO: 0.06 X10*3/UL (ref 0–0.1)
BASOPHILS NFR BLD AUTO: 0.8 %
BILIRUB SERPL-MCNC: 0.7 MG/DL (ref 0–1.2)
BUN SERPL-MCNC: 15 MG/DL (ref 6–23)
CALCIUM SERPL-MCNC: 9.5 MG/DL (ref 8.6–10.3)
CHLORIDE SERPL-SCNC: 107 MMOL/L (ref 98–107)
CO2 SERPL-SCNC: 20 MMOL/L (ref 21–32)
CREAT SERPL-MCNC: 0.74 MG/DL (ref 0.5–1.05)
EGFRCR SERPLBLD CKD-EPI 2021: 84 ML/MIN/1.73M*2
EOSINOPHIL # BLD AUTO: 0.25 X10*3/UL (ref 0–0.4)
EOSINOPHIL NFR BLD AUTO: 3.4 %
ERYTHROCYTE [DISTWIDTH] IN BLOOD BY AUTOMATED COUNT: 21.2 % (ref 11.5–14.5)
GLUCOSE SERPL-MCNC: 99 MG/DL (ref 74–99)
HCT VFR BLD AUTO: 38.9 % (ref 36–46)
HGB BLD-MCNC: 12.2 G/DL (ref 12–16)
IMM GRANULOCYTES # BLD AUTO: 0.07 X10*3/UL (ref 0–0.5)
IMM GRANULOCYTES NFR BLD AUTO: 0.9 % (ref 0–0.9)
LYMPHOCYTES # BLD AUTO: 2.77 X10*3/UL (ref 0.8–3)
LYMPHOCYTES NFR BLD AUTO: 37.4 %
MCH RBC QN AUTO: 30.8 PG (ref 26–34)
MCHC RBC AUTO-ENTMCNC: 31.4 G/DL (ref 32–36)
MCV RBC AUTO: 98 FL (ref 80–100)
MONOCYTES # BLD AUTO: 0.9 X10*3/UL (ref 0.05–0.8)
MONOCYTES NFR BLD AUTO: 12.1 %
NEUTROPHILS # BLD AUTO: 3.36 X10*3/UL (ref 1.6–5.5)
NEUTROPHILS NFR BLD AUTO: 45.4 %
NRBC BLD-RTO: 0 /100 WBCS (ref 0–0)
OVALOCYTES BLD QL SMEAR: NORMAL
PLATELET # BLD AUTO: 111 X10*3/UL (ref 150–450)
POLYCHROMASIA BLD QL SMEAR: NORMAL
POTASSIUM SERPL-SCNC: 4 MMOL/L (ref 3.5–5.3)
PROT SERPL-MCNC: 6.9 G/DL (ref 6.4–8.2)
RBC # BLD AUTO: 3.96 X10*6/UL (ref 4–5.2)
RBC MORPH BLD: NORMAL
SODIUM SERPL-SCNC: 137 MMOL/L (ref 136–145)
WBC # BLD AUTO: 7.4 X10*3/UL (ref 4.4–11.3)

## 2025-05-08 PROCEDURE — 86301 IMMUNOASSAY TUMOR CA 19-9: CPT

## 2025-05-08 PROCEDURE — 85025 COMPLETE CBC W/AUTO DIFF WBC: CPT

## 2025-05-08 PROCEDURE — 36415 COLL VENOUS BLD VENIPUNCTURE: CPT

## 2025-05-08 PROCEDURE — 84075 ASSAY ALKALINE PHOSPHATASE: CPT

## 2025-05-09 ENCOUNTER — PHARMACY VISIT (OUTPATIENT)
Dept: PHARMACY | Facility: CLINIC | Age: 75
End: 2025-05-09
Payer: COMMERCIAL

## 2025-05-09 LAB — CANCER AG19-9 SERPL-ACNC: 37.37 U/ML

## 2025-05-12 ENCOUNTER — TELEMEDICINE (OUTPATIENT)
Dept: HEMATOLOGY/ONCOLOGY | Facility: CLINIC | Age: 75
End: 2025-05-12
Payer: MEDICARE

## 2025-05-12 DIAGNOSIS — C24.0: ICD-10-CM

## 2025-05-12 PROCEDURE — 99213 OFFICE O/P EST LOW 20 MIN: CPT | Performed by: INTERNAL MEDICINE

## 2025-05-12 NOTE — PROGRESS NOTES
"Patient ID: Elsa Castellano is a 74 y.o. female.  This was a virtual visit.  Consent obtained.  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    Plan: Adjuvant capecitabine for 6 months.    Currently she is on adjuvant capecitabine. However, she has developed quite a bit of pain and redness in her hands and feet (grade 2). She is supposed to start a new cycle today, which I deferred at least until next week. She will give us a call next week Monday (5/19/2025). Her labs are in acceptable range.    Follow up- to be determined.    I have placed all orders as outlined above. I advised the patient to schedule the tests and follow-up appointment as discussed by contacting the  on the way out or calling by phone.    Providers:  Surgeon: Dr. Josephine Romeroc:  RadOnc:    Chief complaint: Liver lesion    HPI: 73 year old female with a history of pre-DM, HLD, HTN, CAD s/p MI, COPD, congenital single kidney, who presented as a new patient with adenocarcinoma of the liver.    Patient notes that in 2022 she had a \"small heart attack\" and a CT done on 8/28/22 demonstrated a lateral segment 2 lesion measuring 2.6x2cm. A follow up MRI liver performed on 9/15/23 showed this lesion was 2.8cm and read as \"most compatible with adenoma\". A follow up MRI liver perfromed on 9/9/24 demonstrated this lesion had grown to 4.x2x2.29cm. This was biopsied on 10/2/24 showing moderately differentiated adenocarcinoma involving the liver.      47. Staging CT CAP with 4.3x3x3.7cm hepatic segment 2/3 mass and a 2.6 right adnexal cyst.    1/3/25: hepatectomy and portal lymphadenectomy - pT1aN0, negative margins, no perineural " or LVI     February 14, 2024: CT scan of chest abdomen pelvis showed the following-  IMPRESSION:  Cholangiocarcinoma restaging scan. When compared to the prior  examination dated 10/30/2024, there has been interval postsurgical  changes of a partial hepatectomy. New soft tissue nodule in the left upper quadrant and upper abdomen anterior peritoneum, short-term follow-up scan in 4-6 weeks recommended this could represent truer ecurrence versus evolving postoperative changes. In additionc orrelation with serum tumor markers can also be considered. New phlegmonous tissue and fluid in the left upper quadrant likely related to recent surgery. Additional stable chronic and incidental findings described above.    3/11/25: started C1 capecitabine (2 gm bid- 2 weeks on and 1 week off).   C2 to start on 4/1/2025.    April 14, 2025: CT scan does not show any evidence of recurrent cancer.    Interval history:  This was a phone visit.  She has been on capecitabine for about 2 months. She reports significant redness and pain in her hands and feet. Appetite and energy are good. She denies any fevers, chills, chest pain, dyspnea, cough, rash, mouth sores, nausea, vomiting or diarrhea.    Past Medical History:   Past Medical History:  08/28/2022: Acute non-ST segment elevation myocardial infarction   (Multi)  No date: AMI (acute myocardial infarction) (Multi)  12/26/2023: Anxiety  No date: At risk for falling  09/08/2023: Chest pain  09/08/2023: Chronic obstructive pulmonary disease (Multi)  09/08/2023: Elevated liver enzymes  No date: H/O abdominal surgery  No date: Hepatic adenocarcinoma (Multi)  08/14/2023: Hepatomegaly  11/05/2024: Hiatal hernia  12/30/2023: History of hypertension  09/08/2023: Hyperlipidemia  No date: Hypertension  09/08/2023: Macrocytosis without anemia  No date: Obesity  09/08/2023: Palpitations  01/27/2023: Prediabetes  12/30/2023: Rhinitis  09/08/2023: Shortness of breath  No date: Weakness   Surgical  History:    Past Surgical History:   Procedure Laterality Date    APPENDECTOMY  1979    CARDIAC CATHETERIZATION  2022    x2    CATARACT EXTRACTION Bilateral 2014    COLONOSCOPY  2008    COLONOSCOPY  2013    COLONOSCOPY  2018    DILATION AND CURETTAGE OF UTERUS  2012    OTHER SURGICAL HISTORY  1979    repositioning of intestine    VAGINA SURGERY  1974      Family History:    Family History   Problem Relation Name Age of Onset    No Known Problems Mother      Stroke Father Issac Shields Jr/     Heart attack Father Issac Shields Jr/     No Known Problems Brother      Breast cancer Father's Sister      Breast cancer Paternal Grandmother      Hypertension Other      Diabetes Other       Family Oncology History:    Cancer-related family history includes Breast cancer in her father's sister and paternal grandmother.  Social History:    Social History     Tobacco Use    Smoking status: Every Day     Types: Cigarettes     Passive exposure: Past    Smokeless tobacco: Never   Vaping Use    Vaping status: Never Used   Substance Use Topics    Alcohol use: Yes     Alcohol/week: 10.0 standard drinks of alcohol     Types: 10 Standard drinks or equivalent per week    Drug use: Never        Allergies  Allergies   Allergen Reactions    Penicillins Unknown, Anaphylaxis and Swelling    Hydrochlorothiazide Dizziness        Medications  Current Outpatient Medications   Medication Instructions    albuterol (Proventil HFA) 90 mcg/actuation inhaler 2 puffs, Every 6 hours PRN    aspirin (Adult Low Dose Aspirin) 81 mg EC tablet 1 tablet, Daily    atorvastatin (LIPITOR) 40 mg, oral, Daily    capecitabine (Xeloda) 500 mg tablet Take 4 tablets (2,000 mg total) by mouth 2 times a day for 14 days on and 7 days off for a 21 day cycle.  Swallow whole with water. Do not crush or cut.    melatonin 10 mg tablet Daily    metoprolol succinate XL (TOPROL-XL) 50 mg, oral, Daily    Mounjaro 5 mg, abdominal subcutaneous, Weekly    ondansetron (ZOFRAN) 8 mg,  oral, Every 8 hours PRN    polyethylene glycol (GLYCOLAX, MIRALAX) 17 g, Daily    umeclidinium (Incruse Ellipta) 62.5 mcg/actuation inhalation 1 puff, Daily          Objective   VS: There were no vitals taken for this visit.  Weight:   There were no vitals filed for this visit.         PHYSICAL EXAMINATION  ECOG performance status- 1  VS:  There were no vitals taken for this visit.  Weight:   There were no vitals filed for this visit.        BSA: There is no height or weight on file to calculate BSA.   Pain Scale: 0      Labs  10/21/24:  47, CEA 1.7, AFP 5    Path  12/3/25  A. Peritoneum, Biopsy:   -- Benign fibroadipose tissue, negative for carcinoma.     B. Liver, Partial Hepatectomy:   -- Moderately differentiated adenocarcinoma, 5 cm, consistent with intrahepatic cholangiocarcinoma, small duct type, with adjacent hepatic parenchyma showing prior procedure related changes.  -- No lymphovascular or perineural invasion identified.  -- Hepatic parenchymal margin, negative for carcinoma.  -- Background liver with:  -- Steatohepatitis with moderate steatosis (40-50%), ballooned hepatocytes, and Jayna-Denk bodies.   -- Delicate zone 3 perisinusoidal fibrosis (trichrome/reticulin stains).  -- Moderate stainable iron in periportal hepatocytes (iron stain).  -- No evidence to support alpha-1-antitrypsin deficiency on PAS-D stain.  -- See note and synoptic report.     C. Lymph Node, Hepatoportal, Excision:   -- Six lymph nodes, negative for carcinoma (0/6).     Note: Rare foci of dysplastic biliary epithelium are identified within the intraductal location, which may represent a precursor lesion / high-grade intraepithelial neoplasia, or cancerization of the duct.    Image  10/30/24 CT CAP   IMPRESSION:  Liver lesion of unknown primary:  1. When compared to the 2023 MRI and CT there has been slight interval increase in size of the left hepatic lobe lesion consistent with patient's biopsy-proven adenocarcinoma. No  significant change when compared with the recent MRI dated 09/09/2024. No definite sites of metastatic disease elsewhere.  2. Diffuse hepatic steatosis, correlate with liver function tests.  3. Cystic lesion of the right adnexa measuring 2.6 cm, further evaluation with a pelvic ultrasound is recommended.  4. Additional stable chronic and incidental findings described above.     9/9/24 MRI Liver  IMPRESSION:  1. Diffuse hepatic steatosis with lateral segment 2 hepatic  progressively enhancing solid lesion measuring currently 4.2 x 2.9 cm. This lesion has significantly increased in size since CT scan dated 08/28/2022 as it was measuring 2.6 x 2.0 cm. The growth, patient's age/demographic coupled with the absence of classic signal intensity of benign hepatic lesions including hemangioma would raise  the concern for malignant process including mass forming  cholangiocarcinoma or alternatively metastatic lesions.  2. 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 No duct dilatation. Please see below guidelines for suggested follow-up  3. Known intestinal malrotation without obstruction    This note was created using a voice recognition system ( the Dragon dictation system). Inaccuracies and misspellings are unintentional.     Total time 20 minutes.    Scottie Castro MD.

## 2025-05-19 ENCOUNTER — TELEPHONE (OUTPATIENT)
Dept: HEMATOLOGY/ONCOLOGY | Facility: CLINIC | Age: 75
End: 2025-05-19
Payer: COMMERCIAL

## 2025-05-19 ENCOUNTER — APPOINTMENT (OUTPATIENT)
Dept: HEMATOLOGY/ONCOLOGY | Facility: CLINIC | Age: 75
End: 2025-05-19
Payer: MEDICARE

## 2025-05-19 NOTE — TELEPHONE ENCOUNTER
Spoke to Elsa.  Instructed her per Dr. Castro to hold the capecitabine and call back next week with an update on peeling skin.  Do not strt the capecitabine until he orders it so.  She did a teachback.,

## 2025-05-19 NOTE — TELEPHONE ENCOUNTER
Reason for Conversation  condition update    Background   P calling. She states she was told to call today with ipdte on hands & feet. They are peeling now. Seem to be more pink than red. Still painful but a bit better. She is asking if she should start her medication again on 6/3/25? [facility] advise [phone]    Disposition   No disposition on file.

## 2025-05-27 ENCOUNTER — TELEPHONE (OUTPATIENT)
Dept: HEMATOLOGY/ONCOLOGY | Facility: CLINIC | Age: 75
End: 2025-05-27
Payer: COMMERCIAL

## 2025-05-27 DIAGNOSIS — C22.1 CHOLANGIOCARCINOMA (MULTI): Primary | ICD-10-CM

## 2025-05-27 NOTE — TELEPHONE ENCOUNTER
Pt calling office to inform on any improvement-  Some improvement in hands and can make a fist   Hands are peeling a lot and still red  Feet are better  Dr. Castro pt

## 2025-05-28 PROCEDURE — RXMED WILLOW AMBULATORY MEDICATION CHARGE

## 2025-06-09 ENCOUNTER — LAB (OUTPATIENT)
Dept: LAB | Facility: CLINIC | Age: 75
End: 2025-06-09
Payer: MEDICARE

## 2025-06-09 ENCOUNTER — OFFICE VISIT (OUTPATIENT)
Dept: HEMATOLOGY/ONCOLOGY | Facility: CLINIC | Age: 75
End: 2025-06-09
Payer: MEDICARE

## 2025-06-09 VITALS
TEMPERATURE: 96.8 F | BODY MASS INDEX: 27.66 KG/M2 | SYSTOLIC BLOOD PRESSURE: 127 MMHG | WEIGHT: 176.59 LBS | RESPIRATION RATE: 18 BRPM | HEART RATE: 55 BPM | DIASTOLIC BLOOD PRESSURE: 63 MMHG | OXYGEN SATURATION: 98 %

## 2025-06-09 DIAGNOSIS — C22.1 CHOLANGIOCARCINOMA (MULTI): ICD-10-CM

## 2025-06-09 DIAGNOSIS — C22.1 CHOLANGIOCARCINOMA (MULTI): Primary | ICD-10-CM

## 2025-06-09 LAB
ALBUMIN SERPL BCP-MCNC: 4.5 G/DL (ref 3.4–5)
ALP SERPL-CCNC: 50 U/L (ref 33–136)
ALT SERPL W P-5'-P-CCNC: 12 U/L (ref 7–45)
ANION GAP SERPL CALC-SCNC: 12 MMOL/L (ref 10–20)
AST SERPL W P-5'-P-CCNC: 18 U/L (ref 9–39)
BASOPHILS # BLD AUTO: 0.04 X10*3/UL (ref 0–0.1)
BASOPHILS NFR BLD AUTO: 0.5 %
BILIRUB SERPL-MCNC: 0.6 MG/DL (ref 0–1.2)
BUN SERPL-MCNC: 12 MG/DL (ref 6–23)
CALCIUM SERPL-MCNC: 9.6 MG/DL (ref 8.6–10.3)
CHLORIDE SERPL-SCNC: 108 MMOL/L (ref 98–107)
CO2 SERPL-SCNC: 24 MMOL/L (ref 21–32)
CREAT SERPL-MCNC: 0.72 MG/DL (ref 0.5–1.05)
EGFRCR SERPLBLD CKD-EPI 2021: 87 ML/MIN/1.73M*2
EOSINOPHIL # BLD AUTO: 0.23 X10*3/UL (ref 0–0.4)
EOSINOPHIL NFR BLD AUTO: 2.9 %
ERYTHROCYTE [DISTWIDTH] IN BLOOD BY AUTOMATED COUNT: 20.4 % (ref 11.5–14.5)
GLUCOSE SERPL-MCNC: 90 MG/DL (ref 74–99)
HCT VFR BLD AUTO: 40.7 % (ref 36–46)
HGB BLD-MCNC: 13.4 G/DL (ref 12–16)
IMM GRANULOCYTES # BLD AUTO: 0.02 X10*3/UL (ref 0–0.5)
IMM GRANULOCYTES NFR BLD AUTO: 0.3 % (ref 0–0.9)
LYMPHOCYTES # BLD AUTO: 2.86 X10*3/UL (ref 0.8–3)
LYMPHOCYTES NFR BLD AUTO: 36.2 %
MCH RBC QN AUTO: 32.7 PG (ref 26–34)
MCHC RBC AUTO-ENTMCNC: 32.9 G/DL (ref 32–36)
MCV RBC AUTO: 99 FL (ref 80–100)
MONOCYTES # BLD AUTO: 0.7 X10*3/UL (ref 0.05–0.8)
MONOCYTES NFR BLD AUTO: 8.8 %
NEUTROPHILS # BLD AUTO: 4.06 X10*3/UL (ref 1.6–5.5)
NEUTROPHILS NFR BLD AUTO: 51.3 %
NRBC BLD-RTO: 0 /100 WBCS (ref 0–0)
PLATELET # BLD AUTO: 196 X10*3/UL (ref 150–450)
POTASSIUM SERPL-SCNC: 4.3 MMOL/L (ref 3.5–5.3)
PROT SERPL-MCNC: 6.9 G/DL (ref 6.4–8.2)
RBC # BLD AUTO: 4.1 X10*6/UL (ref 4–5.2)
SODIUM SERPL-SCNC: 140 MMOL/L (ref 136–145)
WBC # BLD AUTO: 7.9 X10*3/UL (ref 4.4–11.3)

## 2025-06-09 PROCEDURE — 1159F MED LIST DOCD IN RCRD: CPT | Performed by: INTERNAL MEDICINE

## 2025-06-09 PROCEDURE — 85025 COMPLETE CBC W/AUTO DIFF WBC: CPT

## 2025-06-09 PROCEDURE — 99214 OFFICE O/P EST MOD 30 MIN: CPT | Performed by: INTERNAL MEDICINE

## 2025-06-09 PROCEDURE — 80053 COMPREHEN METABOLIC PANEL: CPT

## 2025-06-09 PROCEDURE — 86301 IMMUNOASSAY TUMOR CA 19-9: CPT

## 2025-06-09 PROCEDURE — 1126F AMNT PAIN NOTED NONE PRSNT: CPT | Performed by: INTERNAL MEDICINE

## 2025-06-09 PROCEDURE — 36415 COLL VENOUS BLD VENIPUNCTURE: CPT

## 2025-06-09 ASSESSMENT — PAIN SCALES - GENERAL: PAINLEVEL_OUTOF10: 0-NO PAIN

## 2025-06-09 NOTE — PROGRESS NOTES
"Patient ID: Elsa Castellano is a 74 y.o. female.    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    Patient is on adjuvant capecitabine and finishes cycle 4 tomorrow. Following cycle 3, her treatment was held for two weeks due to grade 2 hand-foot syndrome and he dose was lowered 25%. With this lower dose, her hand foot syndrome has improved to grade 1.     Plan: continue capecitabine at current dose for cycle 5  Follow up: July 7 prior to cycle 6    I have placed all orders as outlined above. I advised the patient to schedule the tests and follow-up appointment as discussed by contacting the  on the way out or calling by phone.    Providers:  Surgeon: Dr. Josephine Romeroc:  RadOnc:    Chief complaint: Liver lesion    HPI: 73 year old female with a history of pre-DM, HLD, HTN, CAD s/p MI, COPD, congenital single kidney, who presented as a new patient with adenocarcinoma of the liver.    Patient notes that in 2022 she had a \"small heart attack\" and a CT done on 8/28/22 demonstrated a lateral segment 2 lesion measuring 2.6x2cm. A follow up MRI liver performed on 9/15/23 showed this lesion was 2.8cm and read as \"most compatible with adenoma\". A follow up MRI liver perfromed on 9/9/24 demonstrated this lesion had grown to 4.x2x2.29cm. This was biopsied on 10/2/24 showing moderately differentiated adenocarcinoma involving the liver.      47. Staging CT CAP with 4.3x3x3.7cm hepatic segment 2/3 mass and a 2.6 right adnexal cyst.    1/3/25: hepatectomy and portal lymphadenectomy - pT1aN0, negative margins, no perineural or LVI     February 14, 2024: CT scan of chest abdomen pelvis showed the " "following-  IMPRESSION:  Cholangiocarcinoma restaging scan. When compared to the prior  examination dated 10/30/2024, there has been interval postsurgical  changes of a partial hepatectomy. New soft tissue nodule in the left upper quadrant and upper abdomen anterior peritoneum, short-term follow-up scan in 4-6 weeks recommended this could represent truer ecurrence versus evolving postoperative changes. In additionc orrelation with serum tumor markers can also be considered. New phlegmonous tissue and fluid in the left upper quadrant likely related to recent surgery. Additional stable chronic and incidental findings described above.    3/11/25: started C1 capecitabine (2 gm bid- 2 weeks on and 1 week off).   C2 to start on 4/1/2025.    April 14, 2025: CT scan does not show any evidence of recurrent cancer.    Interval history:  Patient reports improvement in the redness of her hands and feet - now \"more pink than red\". No longer experiencing any pain and her dexterity has improved. She is otherwise feeling well and hs no complaints.  Appetite and energy are good. She denies any fevers, chills, chest pain, dyspnea, cough, rash, mouth sores, nausea, vomiting or diarrhea.    Past Medical History:   Past Medical History:  08/28/2022: Acute non-ST segment elevation myocardial infarction   (Multi)  No date: AMI (acute myocardial infarction) (Multi)  12/26/2023: Anxiety  No date: At risk for falling  09/08/2023: Chest pain  09/08/2023: Chronic obstructive pulmonary disease (Multi)  09/08/2023: Elevated liver enzymes  No date: H/O abdominal surgery  No date: Hepatic adenocarcinoma (Multi)  08/14/2023: Hepatomegaly  11/05/2024: Hiatal hernia  12/30/2023: History of hypertension  09/08/2023: Hyperlipidemia  No date: Hypertension  09/08/2023: Macrocytosis without anemia  No date: Obesity  09/08/2023: Palpitations  01/27/2023: Prediabetes  12/30/2023: Rhinitis  09/08/2023: Shortness of breath  No date: Weakness   Surgical " History:    Past Surgical History:   Procedure Laterality Date    APPENDECTOMY  1979    CARDIAC CATHETERIZATION  2022    x2    CATARACT EXTRACTION Bilateral 2014    COLONOSCOPY  2008    COLONOSCOPY  2013    COLONOSCOPY  2018    DILATION AND CURETTAGE OF UTERUS  2012    OTHER SURGICAL HISTORY  1979    repositioning of intestine    VAGINA SURGERY  1974      Family History:    Family History   Problem Relation Name Age of Onset    No Known Problems Mother      Stroke Father Issac Shields Jr/     Heart attack Father Issac Shields Jr/     No Known Problems Brother      Breast cancer Father's Sister      Breast cancer Paternal Grandmother      Hypertension Other      Diabetes Other       Family Oncology History:    Cancer-related family history includes Breast cancer in her father's sister and paternal grandmother.  Social History:    Social History     Tobacco Use    Smoking status: Every Day     Types: Cigarettes     Passive exposure: Past    Smokeless tobacco: Never   Vaping Use    Vaping status: Never Used   Substance Use Topics    Alcohol use: Yes     Alcohol/week: 10.0 standard drinks of alcohol     Types: 10 Standard drinks or equivalent per week    Drug use: Never        Allergies  Allergies   Allergen Reactions    Penicillins Unknown, Anaphylaxis and Swelling    Hydrochlorothiazide Dizziness        Medications  Current Outpatient Medications   Medication Instructions    albuterol (Proventil HFA) 90 mcg/actuation inhaler 2 puffs, Every 6 hours PRN    aspirin (Adult Low Dose Aspirin) 81 mg EC tablet 1 tablet, Daily    atorvastatin (LIPITOR) 40 mg, oral, Daily    capecitabine (Xeloda) 500 mg tablet Take 4 tablets (2,000 mg total) by mouth 2 times a day for 14 days on and 7 days off for a 21 day cycle.  Swallow whole with water. Do not crush or cut.    melatonin 10 mg tablet Daily    metoprolol succinate XL (TOPROL-XL) 50 mg, oral, Daily    Mounjaro 5 mg, abdominal subcutaneous, Weekly    ondansetron (ZOFRAN) 8 mg,  oral, Every 8 hours PRN    polyethylene glycol (GLYCOLAX, MIRALAX) 17 g, Daily    umeclidinium (Incruse Ellipta) 62.5 mcg/actuation inhalation 1 puff, Daily          Objective   VS: /63 (BP Location: Right arm, Patient Position: Sitting, BP Cuff Size: Adult)   Pulse 55   Temp 36 °C (96.8 °F) (Temporal)   Resp 18   Wt 80.1 kg (176 lb 9.4 oz)   SpO2 98%   BMI 27.66 kg/m²   Weight:   Vitals:    06/09/25 1405   Weight: 80.1 kg (176 lb 9.4 oz)            PHYSICAL EXAMINATION  ECOG performance status- 1  VS:  /63 (BP Location: Right arm, Patient Position: Sitting, BP Cuff Size: Adult)   Pulse 55   Temp 36 °C (96.8 °F) (Temporal)   Resp 18   Wt 80.1 kg (176 lb 9.4 oz)   SpO2 98%   BMI 27.66 kg/m²   Weight:   Vitals:    06/09/25 1405   Weight: 80.1 kg (176 lb 9.4 oz)     General: well-appearing, NAD  Eyes: anicteric  ENT: MMM  Neck: Supple, no LAD  CV: RRR  Resp: CTAB, non-labored  Abd: Soft, NT, ND  Ext: wwp, no edema  Neuro: awake, alert, oriented, fluent speech, moving all extremities  Skin: erythema of hands, non-tender        BSA: 1.95 meters squared   Pain Scale: 0      Labs  10/21/24:  47, CEA 1.7, AFP 5    Path  12/3/25  A. Peritoneum, Biopsy:   -- Benign fibroadipose tissue, negative for carcinoma.     B. Liver, Partial Hepatectomy:   -- Moderately differentiated adenocarcinoma, 5 cm, consistent with intrahepatic cholangiocarcinoma, small duct type, with adjacent hepatic parenchyma showing prior procedure related changes.  -- No lymphovascular or perineural invasion identified.  -- Hepatic parenchymal margin, negative for carcinoma.  -- Background liver with:  -- Steatohepatitis with moderate steatosis (40-50%), ballooned hepatocytes, and Jayna-Denk bodies.   -- Delicate zone 3 perisinusoidal fibrosis (trichrome/reticulin stains).  -- Moderate stainable iron in periportal hepatocytes (iron stain).  -- No evidence to support alpha-1-antitrypsin deficiency on PAS-D stain.  -- See note  and synoptic report.     C. Lymph Node, Hepatoportal, Excision:   -- Six lymph nodes, negative for carcinoma (0/6).     Note: Rare foci of dysplastic biliary epithelium are identified within the intraductal location, which may represent a precursor lesion / high-grade intraepithelial neoplasia, or cancerization of the duct.    Image  10/30/24 CT CAP   IMPRESSION:  Liver lesion of unknown primary:  1. When compared to the 2023 MRI and CT there has been slight interval increase in size of the left hepatic lobe lesion consistent with patient's biopsy-proven adenocarcinoma. No significant change when compared with the recent MRI dated 09/09/2024. No definite sites of metastatic disease elsewhere.  2. Diffuse hepatic steatosis, correlate with liver function tests.  3. Cystic lesion of the right adnexa measuring 2.6 cm, further evaluation with a pelvic ultrasound is recommended.  4. Additional stable chronic and incidental findings described above.     9/9/24 MRI Liver  IMPRESSION:  1. Diffuse hepatic steatosis with lateral segment 2 hepatic  progressively enhancing solid lesion measuring currently 4.2 x 2.9 cm. This lesion has significantly increased in size since CT scan dated 08/28/2022 as it was measuring 2.6 x 2.0 cm. The growth, patient's age/demographic coupled with the absence of classic signal intensity of benign hepatic lesions including hemangioma would raise  the concern for malignant process including mass forming  cholangiocarcinoma or alternatively metastatic lesions.  2. 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 No duct dilatation. Please see below guidelines for suggested follow-up  3. Known intestinal malrotation without obstruction    This note was created using a voice recognition system ( the Dragon dictation system). Inaccuracies and misspellings are unintentional.     Patient seen with Dr. Alex Spears.  I agree with the documentation and the  plan.  Scottie Castro MD.

## 2025-06-09 NOTE — PROGRESS NOTES
Patient here with her  for follow up  with Dr. Castro; seen for intrahepatic carcinoma.    Reconciled and reviewed medication and allergy list with patient.  Confirmed patient is taking Capecitabine 1500mg.    Patient has no pain at this time.     Patient is eating well and without difficulty; not having any issues with diarrhea.     Patients palms mildly erythromelic.  Patient states her feet are still peeling but her hands have stopped. MD had reduced the dose of capecitabine from 2000mg to 1500mg.      Per MD, patient to start next cycle of Xeloda this Wednesday for 14 days on then 7 days off.  Calendar provided to patient.  Patient will return on 7/7/25 for follow up having labs and CT CAP prior to visit.    Reminded patient to check My Chart for any updates to scheduling and to review medication list against what  has at home.  Also reviewed patient can obtain lab results on My Chart which will be reviewed at follow up visits.      Education Documentation  Treatment Plan and Schedule, taught by Jodee Landry RN at 6/9/2025  2:53 PM.  Learner: Significant Other, Patient  Readiness: Acceptance  Method: Explanation, Handout  Response: Verbalizes Understanding  Comment: Calendar provided for next cycle of Xeloda    General Medication Information, taught by Jodee Landry RN at 6/9/2025  2:53 PM.  Learner: Significant Other, Patient  Readiness: Acceptance  Method: Explanation, Handout  Response: Verbalizes Understanding  Comment: Calendar provided for next cycle of Xeloda    When and How to Contact Clinic, taught by Jodee Landry RN at 6/9/2025  2:53 PM.  Learner: Significant Other, Patient  Readiness: Acceptance  Method: Explanation, Handout  Response: Verbalizes Understanding  Comment: Calendar provided for next cycle of Xeloda    Diagnostic Studies, taught by Jodee Landry RN at 6/9/2025  2:53 PM.  Learner: Significant Other, Patient  Readiness: Acceptance  Method: Explanation, Handout  Response: Verbalizes  Understanding  Comment: Calendar provided for next cycle of Xeloda    Tumor Markers, taught by Jodee Landry RN at 6/9/2025  2:53 PM.  Learner: Significant Other, Patient  Readiness: Acceptance  Method: Explanation, Handout  Response: Verbalizes Understanding  Comment: Calendar provided for next cycle of Xeloda    Comprehensive Metabolic Panel (CMP), taught by Jodee Landry RN at 6/9/2025  2:53 PM.  Learner: Significant Other, Patient  Readiness: Acceptance  Method: Explanation, Handout  Response: Verbalizes Understanding  Comment: Calendar provided for next cycle of Xeloda    Complete Blood Count with Differential (CBC w/ Diff), taught by Jodee Landry RN at 6/9/2025  2:53 PM.  Learner: Significant Other, Patient  Readiness: Acceptance  Method: Explanation, Handout  Response: Verbalizes Understanding  Comment: Calendar provided for next cycle of Xeloda    Education Comments  No comments found.    No barriers to education noted, pt. Agreed to plan and verbalized understanding using teach back method

## 2025-06-10 ENCOUNTER — SPECIALTY PHARMACY (OUTPATIENT)
Dept: PHARMACY | Facility: CLINIC | Age: 75
End: 2025-06-10

## 2025-06-10 LAB — CANCER AG19-9 SERPL-ACNC: 128.1 U/ML

## 2025-06-12 ENCOUNTER — PHARMACY VISIT (OUTPATIENT)
Dept: PHARMACY | Facility: CLINIC | Age: 75
End: 2025-06-12
Payer: COMMERCIAL

## 2025-06-29 DIAGNOSIS — E78.2 MIXED HYPERLIPIDEMIA: ICD-10-CM

## 2025-06-29 DIAGNOSIS — R73.9 HYPERGLYCEMIA: ICD-10-CM

## 2025-06-29 DIAGNOSIS — D64.9 ANEMIA, UNSPECIFIED TYPE: ICD-10-CM

## 2025-07-03 ENCOUNTER — LAB (OUTPATIENT)
Dept: LAB | Facility: CLINIC | Age: 75
End: 2025-07-03
Payer: MEDICARE

## 2025-07-03 ENCOUNTER — APPOINTMENT (OUTPATIENT)
Dept: CARDIOLOGY | Facility: CLINIC | Age: 75
End: 2025-07-03
Payer: MEDICARE

## 2025-07-03 VITALS
BODY MASS INDEX: 27.57 KG/M2 | OXYGEN SATURATION: 98 % | RESPIRATION RATE: 16 BRPM | DIASTOLIC BLOOD PRESSURE: 68 MMHG | SYSTOLIC BLOOD PRESSURE: 120 MMHG | WEIGHT: 176 LBS | HEART RATE: 60 BPM

## 2025-07-03 DIAGNOSIS — R07.2 PRECORDIAL PAIN: ICD-10-CM

## 2025-07-03 DIAGNOSIS — C22.1 CHOLANGIOCARCINOMA (MULTI): ICD-10-CM

## 2025-07-03 DIAGNOSIS — R00.2 PALPITATIONS: ICD-10-CM

## 2025-07-03 DIAGNOSIS — Z86.79 HISTORY OF HYPERTENSION: Primary | ICD-10-CM

## 2025-07-03 DIAGNOSIS — E78.2 MIXED HYPERLIPIDEMIA: ICD-10-CM

## 2025-07-03 LAB
ALBUMIN SERPL BCP-MCNC: 4.4 G/DL (ref 3.4–5)
ALP SERPL-CCNC: 48 U/L (ref 33–136)
ALT SERPL W P-5'-P-CCNC: 9 U/L (ref 7–45)
ANION GAP SERPL CALC-SCNC: 13 MMOL/L (ref 10–20)
AST SERPL W P-5'-P-CCNC: 16 U/L (ref 9–39)
BASOPHILS # BLD AUTO: 0.04 X10*3/UL (ref 0–0.1)
BASOPHILS NFR BLD AUTO: 0.6 %
BILIRUB SERPL-MCNC: 0.8 MG/DL (ref 0–1.2)
BUN SERPL-MCNC: 13 MG/DL (ref 6–23)
CALCIUM SERPL-MCNC: 9.2 MG/DL (ref 8.6–10.3)
CANCER AG19-9 SERPL-ACNC: 118.93 U/ML
CHLORIDE SERPL-SCNC: 109 MMOL/L (ref 98–107)
CHOLEST SERPL-MCNC: 136 MG/DL (ref 0–199)
CHOLESTEROL/HDL RATIO: 2.7
CO2 SERPL-SCNC: 22 MMOL/L (ref 21–32)
CREAT SERPL-MCNC: 0.8 MG/DL (ref 0.5–1.05)
EGFRCR SERPLBLD CKD-EPI 2021: 77 ML/MIN/1.73M*2
EOSINOPHIL # BLD AUTO: 0.17 X10*3/UL (ref 0–0.4)
EOSINOPHIL NFR BLD AUTO: 2.6 %
ERYTHROCYTE [DISTWIDTH] IN BLOOD BY AUTOMATED COUNT: 19.1 % (ref 11.5–14.5)
EST. AVERAGE GLUCOSE BLD GHB EST-MCNC: 108 MG/DL
GLUCOSE SERPL-MCNC: 104 MG/DL (ref 74–99)
HBA1C MFR BLD: 5.4 % (ref ?–5.7)
HCT VFR BLD AUTO: 39.4 % (ref 36–46)
HDLC SERPL-MCNC: 50.3 MG/DL
HGB BLD-MCNC: 13.3 G/DL (ref 12–16)
IMM GRANULOCYTES # BLD AUTO: 0.02 X10*3/UL (ref 0–0.5)
IMM GRANULOCYTES NFR BLD AUTO: 0.3 % (ref 0–0.9)
LDLC SERPL CALC-MCNC: 64 MG/DL
LYMPHOCYTES # BLD AUTO: 2.72 X10*3/UL (ref 0.8–3)
LYMPHOCYTES NFR BLD AUTO: 40.8 %
MCH RBC QN AUTO: 34.1 PG (ref 26–34)
MCHC RBC AUTO-ENTMCNC: 33.8 G/DL (ref 32–36)
MCV RBC AUTO: 101 FL (ref 80–100)
MONOCYTES # BLD AUTO: 0.56 X10*3/UL (ref 0.05–0.8)
MONOCYTES NFR BLD AUTO: 8.4 %
NEUTROPHILS # BLD AUTO: 3.15 X10*3/UL (ref 1.6–5.5)
NEUTROPHILS NFR BLD AUTO: 47.3 %
NON HDL CHOLESTEROL: 86 MG/DL (ref 0–149)
NRBC BLD-RTO: 0 /100 WBCS (ref 0–0)
PLATELET # BLD AUTO: 164 X10*3/UL (ref 150–450)
POTASSIUM SERPL-SCNC: 4.1 MMOL/L (ref 3.5–5.3)
PROT SERPL-MCNC: 6.7 G/DL (ref 6.4–8.2)
RBC # BLD AUTO: 3.9 X10*6/UL (ref 4–5.2)
SODIUM SERPL-SCNC: 140 MMOL/L (ref 136–145)
TRIGL SERPL-MCNC: 109 MG/DL (ref 0–149)
TSH SERPL-ACNC: 2.04 MIU/L (ref 0.44–3.98)
VLDL: 22 MG/DL (ref 0–40)
WBC # BLD AUTO: 6.7 X10*3/UL (ref 4.4–11.3)

## 2025-07-03 PROCEDURE — 86301 IMMUNOASSAY TUMOR CA 19-9: CPT

## 2025-07-03 PROCEDURE — 1126F AMNT PAIN NOTED NONE PRSNT: CPT | Performed by: INTERNAL MEDICINE

## 2025-07-03 PROCEDURE — 1160F RVW MEDS BY RX/DR IN RCRD: CPT | Performed by: INTERNAL MEDICINE

## 2025-07-03 PROCEDURE — 36415 COLL VENOUS BLD VENIPUNCTURE: CPT

## 2025-07-03 PROCEDURE — 84075 ASSAY ALKALINE PHOSPHATASE: CPT | Performed by: INTERNAL MEDICINE

## 2025-07-03 PROCEDURE — 85025 COMPLETE CBC W/AUTO DIFF WBC: CPT | Performed by: INTERNAL MEDICINE

## 2025-07-03 PROCEDURE — 99214 OFFICE O/P EST MOD 30 MIN: CPT | Performed by: INTERNAL MEDICINE

## 2025-07-03 PROCEDURE — 80061 LIPID PANEL: CPT | Performed by: INTERNAL MEDICINE

## 2025-07-03 PROCEDURE — 84443 ASSAY THYROID STIM HORMONE: CPT | Performed by: INTERNAL MEDICINE

## 2025-07-03 PROCEDURE — 1159F MED LIST DOCD IN RCRD: CPT | Performed by: INTERNAL MEDICINE

## 2025-07-03 PROCEDURE — 83036 HEMOGLOBIN GLYCOSYLATED A1C: CPT | Performed by: INTERNAL MEDICINE

## 2025-07-03 ASSESSMENT — LIFESTYLE VARIABLES
HOW OFTEN DO YOU HAVE A DRINK CONTAINING ALCOHOL: 4 OR MORE TIMES A WEEK
AUDIT TOTAL SCORE: 4
HAS A RELATIVE, FRIEND, DOCTOR, OR ANOTHER HEALTH PROFESSIONAL EXPRESSED CONCERN ABOUT YOUR DRINKING OR SUGGESTED YOU CUT DOWN: NO
HAVE YOU OR SOMEONE ELSE BEEN INJURED AS A RESULT OF YOUR DRINKING: NO
HOW MANY STANDARD DRINKS CONTAINING ALCOHOL DO YOU HAVE ON A TYPICAL DAY: 1 OR 2
HOW OFTEN DO YOU HAVE SIX OR MORE DRINKS ON ONE OCCASION: NEVER
SKIP TO QUESTIONS 9-10: 1
AUDIT-C TOTAL SCORE: 4

## 2025-07-03 ASSESSMENT — PAIN SCALES - GENERAL: PAINLEVEL_OUTOF10: 0-NO PAIN

## 2025-07-03 ASSESSMENT — ENCOUNTER SYMPTOMS
OCCASIONAL FEELINGS OF UNSTEADINESS: 0
DEPRESSION: 0
LOSS OF SENSATION IN FEET: 0

## 2025-07-03 NOTE — PROGRESS NOTES
[facility] [place]    Interventional Cardiology    History of present illness:  This is a very pleasant 74-year-old female with history of non-STEMI. Cardiac catheterization showed stable moderate LAD disease with negative FFR in 2021. Echo in 2021 was ok.  Patient returns to my office for follow-up.  Has been feeling overall okay.  Denies any chest pain shortness of breath palpitations dizziness lightheadedness or syncope.      Medical History[1]    Surgical History[2]    Allergies[3]     reports that she has quit smoking. Her smoking use included cigarettes. She has been exposed to tobacco smoke. She has never used smokeless tobacco. She reports current alcohol use of about 10.0 standard drinks of alcohol per week. She reports that she does not use drugs.    Family History[4]    Patient's Medications   New Prescriptions    No medications on file   Previous Medications    ALBUTEROL (PROVENTIL HFA) 90 MCG/ACTUATION INHALER    Inhale 2 puffs every 6 hours if needed for wheezing.    ASPIRIN (ADULT LOW DOSE ASPIRIN) 81 MG EC TABLET    Take 1 tablet (81 mg) by mouth once daily.    ATORVASTATIN (LIPITOR) 40 MG TABLET    Take 1 tablet (40 mg) by mouth once daily.    CAPECITABINE (XELODA) 500 MG TABLET    Take 4 tablets (2,000 mg total) by mouth 2 times a day for 14 days on and 7 days off for a 21 day cycle.  Swallow whole with water. Do not crush or cut.    MELATONIN 10 MG TABLET    Take by mouth once daily.    METOPROLOL SUCCINATE XL (TOPROL-XL) 50 MG 24 HR TABLET    Take 1 tablet (50 mg) by mouth once daily.    ONDANSETRON (ZOFRAN) 8 MG TABLET    Take 1 tablet (8 mg) by mouth every 8 hours if needed for nausea or vomiting.    POLYETHYLENE GLYCOL (GLYCOLAX, MIRALAX) 17 GRAM PACKET    Take 17 g by mouth once daily.    TIRZEPATIDE (MOUNJARO) 5 MG/0.5 ML PEN INJECTOR    5 mg by abdominal subcutaneous route 1 (one) time per week.    UMECLIDINIUM (INCRUSE ELLIPTA) 62.5  MCG/ACTUATION INHALATION    Inhale 1 puff (62.5 mcg) once daily.   Modified Medications    No medications on file   Discontinued Medications    No medications on file       Objective   Physical Exam  General: Patient in no acute distress   HEENT: Atraumatic normocephalic.  Neck: Supple, jugular venous pressure within normal limit.  No bruits  Lungs: Clear to auscultation bilaterally  Cardiovascular: Regular rate and rhythm, normal heart sounds, no murmurs rubs or gallops  Abdomen: Soft nontender nondistended.  Normal bowel sounds.  Extremities: Warm to touch, no edema.    Lab Review   Lab on 07/03/2025   Component Date Value    Cancer AG 19-9 07/03/2025 118.93 (H)    Orders Only on 06/29/2025   Component Date Value    Hemoglobin A1C 07/03/2025 5.4     Estimated Average Glucose 07/03/2025 108     Thyroid Stimulating Horm* 07/03/2025 2.04     Cholesterol 07/03/2025 136     HDL-Cholesterol 07/03/2025 50.3     Cholesterol/HDL Ratio 07/03/2025 2.7     LDL Calculated 07/03/2025 64     VLDL 07/03/2025 22     Triglycerides 07/03/2025 109     Non HDL Cholesterol 07/03/2025 86     Glucose 07/03/2025 104 (H)     Sodium 07/03/2025 140     Potassium 07/03/2025 4.1     Chloride 07/03/2025 109 (H)     Bicarbonate 07/03/2025 22     Anion Gap 07/03/2025 13     Urea Nitrogen 07/03/2025 13     Creatinine 07/03/2025 0.80     eGFR 07/03/2025 77     Calcium 07/03/2025 9.2     Albumin 07/03/2025 4.4     Alkaline Phosphatase 07/03/2025 48     Total Protein 07/03/2025 6.7     AST 07/03/2025 16     Bilirubin, Total 07/03/2025 0.8     ALT 07/03/2025 9     WBC 07/03/2025 6.7     nRBC 07/03/2025 0.0     RBC 07/03/2025 3.90 (L)     Hemoglobin 07/03/2025 13.3     Hematocrit 07/03/2025 39.4     MCV 07/03/2025 101 (H)     MCH 07/03/2025 34.1 (H)     MCHC 07/03/2025 33.8     RDW 07/03/2025 19.1 (H)     Platelets 07/03/2025 164     Neutrophils % 07/03/2025 47.3     Immature Granulocytes %,* 07/03/2025 0.3     Lymphocytes % 07/03/2025 40.8      Monocytes % 07/03/2025 8.4     Eosinophils % 07/03/2025 2.6     Basophils % 07/03/2025 0.6     Neutrophils Absolute 07/03/2025 3.15     Immature Granulocytes Ab* 07/03/2025 0.02     Lymphocytes Absolute 07/03/2025 2.72     Monocytes Absolute 07/03/2025 0.56     Eosinophils Absolute 07/03/2025 0.17     Basophils Absolute 07/03/2025 0.04    Lab on 06/09/2025   Component Date Value    Glucose 06/09/2025 90     Sodium 06/09/2025 140     Potassium 06/09/2025 4.3     Chloride 06/09/2025 108 (H)     Bicarbonate 06/09/2025 24     Anion Gap 06/09/2025 12     Urea Nitrogen 06/09/2025 12     Creatinine 06/09/2025 0.72     eGFR 06/09/2025 87     Calcium 06/09/2025 9.6     Albumin 06/09/2025 4.5     Alkaline Phosphatase 06/09/2025 50     Total Protein 06/09/2025 6.9     AST 06/09/2025 18     Bilirubin, Total 06/09/2025 0.6     ALT 06/09/2025 12     WBC 06/09/2025 7.9     nRBC 06/09/2025 0.0     RBC 06/09/2025 4.10     Hemoglobin 06/09/2025 13.4     Hematocrit 06/09/2025 40.7     MCV 06/09/2025 99     MCH 06/09/2025 32.7     MCHC 06/09/2025 32.9     RDW 06/09/2025 20.4 (H)     Platelets 06/09/2025 196     Neutrophils % 06/09/2025 51.3     Immature Granulocytes %,* 06/09/2025 0.3     Lymphocytes % 06/09/2025 36.2     Monocytes % 06/09/2025 8.8     Eosinophils % 06/09/2025 2.9     Basophils % 06/09/2025 0.5     Neutrophils Absolute 06/09/2025 4.06     Immature Granulocytes Ab* 06/09/2025 0.02     Lymphocytes Absolute 06/09/2025 2.86     Monocytes Absolute 06/09/2025 0.70     Eosinophils Absolute 06/09/2025 0.23     Basophils Absolute 06/09/2025 0.04     Cancer AG 19-9 06/09/2025 128.10 (H)    Lab on 05/08/2025   Component Date Value    Glucose 05/08/2025 99     Sodium 05/08/2025 137     Potassium 05/08/2025 4.0     Chloride 05/08/2025 107     Bicarbonate 05/08/2025 20 (L)     Anion Gap 05/08/2025 14     Urea Nitrogen 05/08/2025 15     Creatinine 05/08/2025 0.74     eGFR 05/08/2025 84     Calcium 05/08/2025 9.5     Albumin  05/08/2025 4.5     Alkaline Phosphatase 05/08/2025 51     Total Protein 05/08/2025 6.9     AST 05/08/2025 19     Bilirubin, Total 05/08/2025 0.7     ALT 05/08/2025 12     Cancer AG 19-9 05/08/2025 37.37 (H)     WBC 05/08/2025 7.4     nRBC 05/08/2025 0.0     RBC 05/08/2025 3.96 (L)     Hemoglobin 05/08/2025 12.2     Hematocrit 05/08/2025 38.9     MCV 05/08/2025 98     MCH 05/08/2025 30.8     MCHC 05/08/2025 31.4 (L)     RDW 05/08/2025 21.2 (H)     Platelets 05/08/2025 111 (L)     Neutrophils % 05/08/2025 45.4     Immature Granulocytes %,* 05/08/2025 0.9     Lymphocytes % 05/08/2025 37.4     Monocytes % 05/08/2025 12.1     Eosinophils % 05/08/2025 3.4     Basophils % 05/08/2025 0.8     Neutrophils Absolute 05/08/2025 3.36     Immature Granulocytes Ab* 05/08/2025 0.07     Lymphocytes Absolute 05/08/2025 2.77     Monocytes Absolute 05/08/2025 0.90 (H)     Eosinophils Absolute 05/08/2025 0.25     Basophils Absolute 05/08/2025 0.06     RBC Morphology 05/08/2025 See Below     Polychromasia 05/08/2025 Mild     Ovalocytes 05/08/2025 Few         Assessment/Plan   Problem List[5]   This is a very pleasant 74-year-old female with history of non-STEMI. Cardiac catheterization showed stable moderate LAD disease with negative FFR in 2021. Echo in 2021 was ok.  Patient returns to my office for follow-up.  Has been feeling overall okay.  Denies any chest pain shortness of breath palpitations dizziness lightheadedness or syncope.      Patient recovering from chemotherapy after being diagnosed of liver cancer stage I currently in remission.  Stable cardiac wise. Currently on optimal medical therapy.  On aspirin and atorvastatin.  Blood pressure and heart rate well-controlled on amlodipine and metoprolol succinate.  Will continue current treatment.  Will follow-up in 6 months.      Ady Bedolla MD              [1]   Past Medical History:  Diagnosis Date    Acute non-ST segment elevation myocardial infarction (Multi) 08/28/2022    AMI  (acute myocardial infarction) (Multi)     Anxiety 12/26/2023    At risk for falling     Chest pain 09/08/2023    Chronic obstructive pulmonary disease (Multi) 09/08/2023    Elevated liver enzymes 09/08/2023    H/O abdominal surgery     Hepatic adenocarcinoma (Multi)     Hepatomegaly 08/14/2023    Hiatal hernia 11/05/2024    History of hypertension 12/30/2023    Hyperlipidemia 09/08/2023    Hypertension     Macrocytosis without anemia 09/08/2023    Obesity     Palpitations 09/08/2023    Prediabetes 01/27/2023    Rhinitis 12/30/2023    Shortness of breath 09/08/2023    Weakness    [2]   Past Surgical History:  Procedure Laterality Date    APPENDECTOMY  1979    CARDIAC CATHETERIZATION  2022    x2    CATARACT EXTRACTION Bilateral 2014    COLONOSCOPY  2008    COLONOSCOPY  2013    COLONOSCOPY  2018    DILATION AND CURETTAGE OF UTERUS  2012    OTHER SURGICAL HISTORY  1979    repositioning of intestine    VAGINA SURGERY  1974   [3]   Allergies  Allergen Reactions    Penicillins Unknown, Anaphylaxis and Swelling    Hydrochlorothiazide Dizziness   [4]   Family History  Problem Relation Name Age of Onset    No Known Problems Mother      Stroke Father Issac Shields Jr/     Heart attack Father Issac Shields Jr/     No Known Problems Brother      Breast cancer Father's Sister      Breast cancer Paternal Grandmother      Hypertension Other      Diabetes Other     [5]   Patient Active Problem List  Diagnosis    Chronic obstructive pulmonary disease (Multi)    Elevated liver enzymes    Macrocytosis without anemia    Hyperlipidemia    Anxiety    Hepatomegaly    Prediabetes    History of hypertension    Obesity with body mass index 30 or greater    Rhinitis

## 2025-07-07 ENCOUNTER — SPECIALTY PHARMACY (OUTPATIENT)
Dept: PHARMACY | Facility: CLINIC | Age: 75
End: 2025-07-07

## 2025-07-07 ENCOUNTER — OFFICE VISIT (OUTPATIENT)
Dept: HEMATOLOGY/ONCOLOGY | Facility: CLINIC | Age: 75
End: 2025-07-07
Payer: MEDICARE

## 2025-07-07 VITALS
OXYGEN SATURATION: 96 % | TEMPERATURE: 96.4 F | RESPIRATION RATE: 18 BRPM | DIASTOLIC BLOOD PRESSURE: 79 MMHG | HEART RATE: 61 BPM | SYSTOLIC BLOOD PRESSURE: 137 MMHG | WEIGHT: 179.68 LBS | BODY MASS INDEX: 28.14 KG/M2

## 2025-07-07 DIAGNOSIS — C22.1 CHOLANGIOCARCINOMA (MULTI): Primary | ICD-10-CM

## 2025-07-07 PROCEDURE — 1126F AMNT PAIN NOTED NONE PRSNT: CPT | Performed by: INTERNAL MEDICINE

## 2025-07-07 PROCEDURE — RXMED WILLOW AMBULATORY MEDICATION CHARGE

## 2025-07-07 PROCEDURE — 99214 OFFICE O/P EST MOD 30 MIN: CPT | Performed by: INTERNAL MEDICINE

## 2025-07-07 PROCEDURE — 1159F MED LIST DOCD IN RCRD: CPT | Performed by: INTERNAL MEDICINE

## 2025-07-07 ASSESSMENT — PAIN SCALES - GENERAL: PAINLEVEL_OUTOF10: 0-NO PAIN

## 2025-07-07 NOTE — PROGRESS NOTES
"Patient ID: Elsa Castellano is a 74 y.o. female.    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    She is on adjuvant capecitabine.  Currently she is taking capecitabine 1500 mg p.o. twice daily.  This is a new dose which started after she had severe hand-foot syndrome.  She has been tolerating this well does well.  She will start the next cycle on July 9, 2025.    Her CA 19-9 is slightly elevated.  I ordered a scan for July 11, 2025 followed by a discussion in the tumor board.    Follow up: Phone visit on July 17.    I have placed all orders as outlined above. I advised the patient to schedule the tests and follow-up appointment as discussed by contacting the  on the way out or calling by phone.    Providers:  Surgeon: Dr. Josephine Romeroc:  RadOnc:    Chief complaint: Liver lesion    HPI: 73 year old female with a history of pre-DM, HLD, HTN, CAD s/p MI, COPD, congenital single kidney, who presented as a new patient with adenocarcinoma of the liver.    Patient notes that in 2022 she had a \"small heart attack\" and a CT done on 8/28/22 demonstrated a lateral segment 2 lesion measuring 2.6x2cm. A follow up MRI liver performed on 9/15/23 showed this lesion was 2.8cm and read as \"most compatible with adenoma\". A follow up MRI liver perfromed on 9/9/24 demonstrated this lesion had grown to 4.x2x2.29cm. This was biopsied on 10/2/24 showing moderately differentiated adenocarcinoma involving the liver.      47. Staging CT CAP with 4.3x3x3.7cm hepatic segment 2/3 mass and a 2.6 right adnexal cyst.    1/3/25: hepatectomy and portal lymphadenectomy - pT1aN0, negative margins, no perineural or LVI "     February 14, 2024: CT scan of chest abdomen pelvis showed the following-  IMPRESSION:  Cholangiocarcinoma restaging scan. When compared to the prior  examination dated 10/30/2024, there has been interval postsurgical  changes of a partial hepatectomy. New soft tissue nodule in the left upper quadrant and upper abdomen anterior peritoneum, short-term follow-up scan in 4-6 weeks recommended this could represent truer ecurrence versus evolving postoperative changes. In additionc orrelation with serum tumor markers can also be considered. New phlegmonous tissue and fluid in the left upper quadrant likely related to recent surgery. Additional stable chronic and incidental findings described above.    3/11/25: started C1 capecitabine (2 gm bid- 2 weeks on and 1 week off).   C2 to start on 4/1/2025.    April 14, 2025: CT scan does not show any evidence of recurrent cancer.    Interval history:  Patient reports complete resolution of pain and redness in her hands and feet.  She has been tolerating 1500 mg p.o. twice daily does well.   Appetite and energy are good. She denies any fevers, chills, chest pain, dyspnea, cough, rash, mouth sores, nausea, vomiting or diarrhea.    Past Medical History:   Past Medical History:  08/28/2022: Acute non-ST segment elevation myocardial infarction   (Multi)  No date: AMI (acute myocardial infarction) (Multi)  12/26/2023: Anxiety  No date: At risk for falling  09/08/2023: Chest pain  09/08/2023: Chronic obstructive pulmonary disease (Multi)  09/08/2023: Elevated liver enzymes  No date: H/O abdominal surgery  No date: Hepatic adenocarcinoma (Multi)  08/14/2023: Hepatomegaly  11/05/2024: Hiatal hernia  12/30/2023: History of hypertension  09/08/2023: Hyperlipidemia  No date: Hypertension  09/08/2023: Macrocytosis without anemia  No date: Obesity  09/08/2023: Palpitations  01/27/2023: Prediabetes  12/30/2023: Rhinitis  09/08/2023: Shortness of breath  No date: Weakness   Surgical History:     Past Surgical History:   Procedure Laterality Date    APPENDECTOMY  1979    CARDIAC CATHETERIZATION  2022    x2    CATARACT EXTRACTION Bilateral 2014    COLONOSCOPY  2008    COLONOSCOPY  2013    COLONOSCOPY  2018    DILATION AND CURETTAGE OF UTERUS  2012    OTHER SURGICAL HISTORY  1979    repositioning of intestine    VAGINA SURGERY  1974      Family History:    Family History   Problem Relation Name Age of Onset    No Known Problems Mother      Stroke Father Issac Shields Jr/     Heart attack Father Issac Shields Jr/     No Known Problems Brother      Breast cancer Father's Sister      Breast cancer Paternal Grandmother      Hypertension Other      Diabetes Other       Family Oncology History:    Cancer-related family history includes Breast cancer in her father's sister and paternal grandmother.  Social History:    Social History     Tobacco Use    Smoking status: Former     Types: Cigarettes     Passive exposure: Past    Smokeless tobacco: Never   Vaping Use    Vaping status: Never Used   Substance Use Topics    Alcohol use: Yes     Alcohol/week: 10.0 standard drinks of alcohol     Types: 10 Standard drinks or equivalent per week    Drug use: Never        Allergies  Allergies   Allergen Reactions    Penicillins Unknown, Anaphylaxis and Swelling    Hydrochlorothiazide Dizziness        Medications  Current Outpatient Medications   Medication Instructions    albuterol (Proventil HFA) 90 mcg/actuation inhaler 2 puffs, Every 6 hours PRN    aspirin (Adult Low Dose Aspirin) 81 mg EC tablet 1 tablet, Daily    atorvastatin (LIPITOR) 40 mg, oral, Daily    capecitabine (Xeloda) 500 mg tablet Take 4 tablets (2,000 mg total) by mouth 2 times a day for 14 days on and 7 days off for a 21 day cycle.  Swallow whole with water. Do not crush or cut.    melatonin 10 mg tablet Daily    metoprolol succinate XL (TOPROL-XL) 50 mg, oral, Daily    Mounjaro 5 mg, abdominal subcutaneous, Weekly    ondansetron (ZOFRAN) 8 mg, oral, Every  8 hours PRN    polyethylene glycol (GLYCOLAX, MIRALAX) 17 g, Daily    umeclidinium (Incruse Ellipta) 62.5 mcg/actuation inhalation 1 puff, Daily          Objective   VS: /79 (BP Location: Left arm, Patient Position: Sitting, BP Cuff Size: Adult)   Pulse 61   Temp 35.8 °C (96.4 °F) (Temporal)   Resp 18   Wt 81.5 kg (179 lb 10.8 oz)   SpO2 96%   BMI 28.14 kg/m²   Weight:   Vitals:    07/07/25 1032   Weight: 81.5 kg (179 lb 10.8 oz)            PHYSICAL EXAMINATION  ECOG performance status- 1  VS:  /79 (BP Location: Left arm, Patient Position: Sitting, BP Cuff Size: Adult)   Pulse 61   Temp 35.8 °C (96.4 °F) (Temporal)   Resp 18   Wt 81.5 kg (179 lb 10.8 oz)   SpO2 96%   BMI 28.14 kg/m²   Weight:   Vitals:    07/07/25 1032   Weight: 81.5 kg (179 lb 10.8 oz)     ECOG PS- 1  Alert, ambulant, and answers questions appropriately.  Eyes- No pallor or icterus.  Neck- no swelling, lymph node enlargement or thyroid gland enlargement.  Chest- Bilateral good air entry. No crackles/ronchi.  Heart- no audible abnormal heart sounds.  Abdomen- Soft, non-tender. No mass or ascites.  Extremities- no swelling or pitting edema.        BSA: 1.96 meters squared   Pain Scale: 0      Labs  10/21/24:  47, CEA 1.7, AFP 5    Path  12/3/25  A. Peritoneum, Biopsy:   -- Benign fibroadipose tissue, negative for carcinoma.     B. Liver, Partial Hepatectomy:   -- Moderately differentiated adenocarcinoma, 5 cm, consistent with intrahepatic cholangiocarcinoma, small duct type, with adjacent hepatic parenchyma showing prior procedure related changes.  -- No lymphovascular or perineural invasion identified.  -- Hepatic parenchymal margin, negative for carcinoma.  -- Background liver with:  -- Steatohepatitis with moderate steatosis (40-50%), ballooned hepatocytes, and Jayna-Denk bodies.   -- Delicate zone 3 perisinusoidal fibrosis (trichrome/reticulin stains).  -- Moderate stainable iron in periportal hepatocytes (iron  stain).  -- No evidence to support alpha-1-antitrypsin deficiency on PAS-D stain.  -- See note and synoptic report.     C. Lymph Node, Hepatoportal, Excision:   -- Six lymph nodes, negative for carcinoma (0/6).     Note: Rare foci of dysplastic biliary epithelium are identified within the intraductal location, which may represent a precursor lesion / high-grade intraepithelial neoplasia, or cancerization of the duct.    Image  10/30/24 CT CAP   IMPRESSION:  Liver lesion of unknown primary:  1. When compared to the 2023 MRI and CT there has been slight interval increase in size of the left hepatic lobe lesion consistent with patient's biopsy-proven adenocarcinoma. No significant change when compared with the recent MRI dated 09/09/2024. No definite sites of metastatic disease elsewhere.  2. Diffuse hepatic steatosis, correlate with liver function tests.  3. Cystic lesion of the right adnexa measuring 2.6 cm, further evaluation with a pelvic ultrasound is recommended.  4. Additional stable chronic and incidental findings described above.     9/9/24 MRI Liver  IMPRESSION:  1. Diffuse hepatic steatosis with lateral segment 2 hepatic  progressively enhancing solid lesion measuring currently 4.2 x 2.9 cm. This lesion has significantly increased in size since CT scan dated 08/28/2022 as it was measuring 2.6 x 2.0 cm. The growth, patient's age/demographic coupled with the absence of classic signal intensity of benign hepatic lesions including hemangioma would raise  the concern for malignant process including mass forming  cholangiocarcinoma or alternatively metastatic lesions.  2. 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 No duct dilatation. Please see below guidelines for suggested follow-up  3. Known intestinal malrotation without obstruction    This note was created using a voice recognition system ( the Dragon dictation system). Inaccuracies and misspellings are  unintentional.     Scottie Castro MD.

## 2025-07-07 NOTE — PROGRESS NOTES
Patient ambulated into clinic for follow up with Dr. Castro accompanied by her . Medications and allergies reviewed.     CT CAP scan moved to this Friday 7/11.     Follow up via phone call after tumor board on 7/16.     Oral chemo calendar provided through September.

## 2025-07-08 ENCOUNTER — PHARMACY VISIT (OUTPATIENT)
Dept: PHARMACY | Facility: CLINIC | Age: 75
End: 2025-07-08
Payer: COMMERCIAL

## 2025-07-09 ENCOUNTER — OFFICE VISIT (OUTPATIENT)
Dept: PRIMARY CARE | Facility: CLINIC | Age: 75
End: 2025-07-09
Payer: MEDICARE

## 2025-07-09 VITALS
SYSTOLIC BLOOD PRESSURE: 138 MMHG | TEMPERATURE: 96.3 F | BODY MASS INDEX: 28.25 KG/M2 | OXYGEN SATURATION: 97 % | WEIGHT: 180 LBS | HEART RATE: 57 BPM | DIASTOLIC BLOOD PRESSURE: 64 MMHG | HEIGHT: 67 IN

## 2025-07-09 DIAGNOSIS — J44.9 CHRONIC OBSTRUCTIVE PULMONARY DISEASE, UNSPECIFIED COPD TYPE (MULTI): ICD-10-CM

## 2025-07-09 DIAGNOSIS — E78.2 MIXED HYPERLIPIDEMIA: ICD-10-CM

## 2025-07-09 DIAGNOSIS — Z86.79 HISTORY OF HYPERTENSION: Primary | ICD-10-CM

## 2025-07-09 DIAGNOSIS — R73.9 HYPERGLYCEMIA: ICD-10-CM

## 2025-07-09 DIAGNOSIS — R73.03 PREDIABETES: ICD-10-CM

## 2025-07-09 PROCEDURE — 1159F MED LIST DOCD IN RCRD: CPT | Performed by: INTERNAL MEDICINE

## 2025-07-09 PROCEDURE — 1126F AMNT PAIN NOTED NONE PRSNT: CPT | Performed by: INTERNAL MEDICINE

## 2025-07-09 PROCEDURE — 99214 OFFICE O/P EST MOD 30 MIN: CPT | Performed by: INTERNAL MEDICINE

## 2025-07-09 PROCEDURE — G2211 COMPLEX E/M VISIT ADD ON: HCPCS | Performed by: INTERNAL MEDICINE

## 2025-07-09 RX ORDER — AMLODIPINE BESYLATE 10 MG/1
10 TABLET ORAL DAILY
COMMUNITY
Start: 2025-06-30

## 2025-07-09 ASSESSMENT — ENCOUNTER SYMPTOMS
RESPIRATORY NEGATIVE: 1
GASTROINTESTINAL NEGATIVE: 1
CARDIOVASCULAR NEGATIVE: 1

## 2025-07-09 ASSESSMENT — PAIN SCALES - GENERAL: PAINLEVEL_OUTOF10: 0-NO PAIN

## 2025-07-09 NOTE — PATIENT INSTRUCTIONS
It looks like you are doing fine with our current program no need for us to change your medicines I will plan on seeing you back in 6 months for your wellness visit.

## 2025-07-09 NOTE — PROGRESS NOTES
[facility]: [facility]  PROGRESS NOTE      Elsa Castellano is a 74 y.o. female that is presenting today for Follow-up.    Assessment/Plan   Diagnoses and all orders for this visit:  History of hypertension  -     CBC and Auto Differential; Future  -     Comprehensive Metabolic Panel; Future  Prediabetes  Mixed hyperlipidemia  -     Lipid Panel; Future  Chronic obstructive pulmonary disease, unspecified COPD type (Multi)  Hyperglycemia  -     Hemoglobin A1C; Future  Other orders  -     Follow Up In Primary Care - Established  -     Follow Up In Primary Care - Medicare Annual; Future  We reviewed her recent blood work and considered her current situation:  1.  Hypertension-stable on current program  2.  Hyperlipidemia-doing very well on current regimen  3.  Prediabetes-numbers are still just in the prediabetes range and are very well-controlled  4.  Weight control-the patient is still down 35 pounds from her max weight and really has not changed in the last 6 months.  She is congratulated in that regard.  And we will continue with just managing this with dietary intervention.    I will plan on seeing her back in 6 months unless needed sooner  Subjective   This 74-year-old is here for follow-up for her chronic medical problems.  Her underlying theme is her care for her cholangiocarcinoma for which she sees oncology.  She has so far been tolerating her oral chemotherapy pretty well except for some neuropathic symptoms now in her hands and feet that they Made some dosage adjustments for her.    Otherwise she is doing fine.  She was no longer able to get Mounjaro with only having prediabetes.  She has been able to maintain her weight without it      Review of Systems   Respiratory: Negative.     Cardiovascular: Negative.    Gastrointestinal: Negative.       Objective   Vitals:    07/09/25 0906   BP: 138/64   Pulse: 57   Temp: 35.7 °C (96.3 °F)   SpO2: 97%      Body mass index is 28.19  "kg/m².  Physical Exam  Cardiovascular:      Rate and Rhythm: Normal rate and regular rhythm.      Pulses: Normal pulses.      Heart sounds: Normal heart sounds.   Pulmonary:      Effort: Pulmonary effort is normal.      Breath sounds: Normal breath sounds.   Abdominal:      General: Abdomen is flat. Bowel sounds are normal.      Palpations: Abdomen is soft.   Musculoskeletal:         General: Normal range of motion.   Skin:     General: Skin is warm and dry.       Diagnostic Results   Lab Results   Component Value Date    GLUCOSE 104 (H) 07/03/2025    CALCIUM 9.2 07/03/2025     07/03/2025    K 4.1 07/03/2025    CO2 22 07/03/2025     (H) 07/03/2025    BUN 13 07/03/2025    CREATININE 0.80 07/03/2025     Lab Results   Component Value Date    ALT 9 07/03/2025    AST 16 07/03/2025    ALKPHOS 48 07/03/2025    BILITOT 0.8 07/03/2025     Lab Results   Component Value Date    WBC 6.7 07/03/2025    HGB 13.3 07/03/2025    HCT 39.4 07/03/2025     (H) 07/03/2025     07/03/2025     Lab Results   Component Value Date    CHOL 136 07/03/2025    CHOL 146 12/31/2024    CHOL 162 06/18/2024     Lab Results   Component Value Date    HDL 50.3 07/03/2025    HDL 33.9 12/31/2024    HDL 62.0 06/18/2024     Lab Results   Component Value Date    LDLCALC 64 07/03/2025    LDLCALC 89 12/31/2024    LDLCALC 80 06/18/2024     Lab Results   Component Value Date    TRIG 109 07/03/2025    TRIG 115 12/31/2024    TRIG 99 06/18/2024     No components found for: \"CHOLHDL\"  Lab Results   Component Value Date    HGBA1C 5.4 07/03/2025     Other labs not included in the list above were reviewed either before or during this encounter.    History    Medical History[1]  Surgical History[2]  Family History[3]  Social History     Socioeconomic History    Marital status:      Spouse name: Not on file    Number of children: Not on file    Years of education: Not on file    Highest education level: Not on file   Occupational History    " Not on file   Tobacco Use    Smoking status: Former     Types: Cigarettes     Passive exposure: Past    Smokeless tobacco: Never   Vaping Use    Vaping status: Never Used   Substance and Sexual Activity    Alcohol use: Yes     Alcohol/week: 10.0 standard drinks of alcohol     Types: 10 Standard drinks or equivalent per week    Drug use: Never    Sexual activity: Defer   Other Topics Concern    Not on file   Social History Narrative    Not on file     Social Drivers of Health     Financial Resource Strain: Low Risk  (12/10/2024)    Overall Financial Resource Strain (CARDIA)     Difficulty of Paying Living Expenses: Not hard at all   Food Insecurity: No Food Insecurity (12/3/2024)    Hunger Vital Sign     Worried About Running Out of Food in the Last Year: Never true     Ran Out of Food in the Last Year: Never true   Transportation Needs: No Transportation Needs (12/10/2024)    PRAPARE - Transportation     Lack of Transportation (Medical): No     Lack of Transportation (Non-Medical): No   Physical Activity: Inactive (10/17/2024)    Exercise Vital Sign     Days of Exercise per Week: 0 days     Minutes of Exercise per Session: 0 min   Stress: No Stress Concern Present (10/17/2024)    [employer] - Occupational Stress Questionnaire     Feeling of Stress : Not at all   Social Connections: Moderately Integrated (10/17/2024)    Social Connection and Isolation Panel [NHANES]     Frequency of Communication with Friends and Family: Three times a week     Frequency of Social Gatherings with Friends and Family: More than three times a week     Attends Sabianism Services: More than 4 times per year     Active Member of Clubs or Organizations: No     Attends Club or Organization Meetings: Never     Marital Status:    Intimate Partner Violence: Not At Risk (12/3/2024)    Humiliation, Afraid, Rape, and Kick questionnaire     Fear of Current or Ex-Partner: No     Emotionally Abused: No      Physically Abused: No     Sexually Abused: No   Housing Stability: Low Risk  (12/10/2024)    Housing Stability Vital Sign     Unable to Pay for Housing in the Last Year: No     Number of Times Moved in the Last Year: 0     Homeless in the Last Year: No     Allergies[4]  Medications Ordered Prior to Encounter[5]  Immunization History   Administered Date(s) Administered    DTaP vaccine, pediatric  (INFANRIX) 08/24/2007    Flu vaccine, quadrivalent, high-dose, preservative free, age 65y+ (FLUZONE) 09/23/2020, 09/28/2021, 10/19/2022    Flu vaccine, trivalent, preservative free, HIGH-DOSE, age 65y+ (Fluzone) 11/18/2015, 10/12/2018, 12/16/2019    Influenza, Seasonal, Quadrivalent, Adjuvanted 10/24/2023    Influenza, injectable, quadrivalent 10/09/2014, 11/12/2019, 10/12/2020    Influenza, seasonal, injectable 10/15/2010, 10/28/2011, 11/30/2012, 01/03/2014, 10/05/2015, 10/17/2016    Influenza, trivalent, adjuvanted 10/09/2024    Moderna COVID-19 vaccine, bivalent, blue cap/gray label *Check age/dose* 10/03/2022    Moderna SARS-CoV-2 Booster 01/31/2024    Moderna SARS-CoV-2 Vaccination 02/02/2021, 03/01/2021, 10/28/2021    Novel influenza-H1N1-09, preservative-free 01/14/2010    PPD Test 08/26/2011, 12/11/2024, 12/18/2024    Pfizer COVID-19 vaccine, 12 years and older, (30mcg/0.3mL) (Comirnaty) 10/09/2024    Pneumococcal conjugate vaccine, 13-valent (PREVNAR 13) 01/16/2015    Pneumococcal conjugate vaccine, 20-valent (PREVNAR 20) 07/27/2022    Pneumococcal polysaccharide vaccine, 23-valent, age 2 years and older (PNEUMOVAX 23) 12/16/2019    Tdap vaccine, age 7 year and older (BOOSTRIX, ADACEL) 01/19/2022    Zoster, live 02/04/2010     Patient's medical history was reviewed and updated either before or during this encounter.       Gilbert Velazco MD       [1]   Past Medical History:  Diagnosis Date    Acute non-ST segment elevation myocardial infarction (Multi) 08/28/2022    AMI (acute myocardial infarction) (Multi)      Anxiety 12/26/2023    At risk for falling     Chest pain 09/08/2023    Chronic obstructive pulmonary disease (Multi) 09/08/2023    Elevated liver enzymes 09/08/2023    H/O abdominal surgery     Hepatic adenocarcinoma (Multi)     Hepatomegaly 08/14/2023    Hiatal hernia 11/05/2024    History of hypertension 12/30/2023    Hyperlipidemia 09/08/2023    Hypertension     Macrocytosis without anemia 09/08/2023    Obesity     Palpitations 09/08/2023    Prediabetes 01/27/2023    Rhinitis 12/30/2023    Shortness of breath 09/08/2023    Weakness    [2]   Past Surgical History:  Procedure Laterality Date    APPENDECTOMY  1979    CARDIAC CATHETERIZATION  2022    x2    CATARACT EXTRACTION Bilateral 2014    COLONOSCOPY  2008    COLONOSCOPY  2013    COLONOSCOPY  2018    DILATION AND CURETTAGE OF UTERUS  2012    OTHER SURGICAL HISTORY  1979    repositioning of intestine    VAGINA SURGERY  1974   [3]   Family History  Problem Relation Name Age of Onset    No Known Problems Mother      Stroke Father Issac Shields Jr/     Heart attack Father Issac Shields Jr/     No Known Problems Brother      Breast cancer Father's Sister      Breast cancer Paternal Grandmother      Hypertension Other      Diabetes Other     [4]   Allergies  Allergen Reactions    Penicillins Unknown, Anaphylaxis and Swelling    Hydrochlorothiazide Dizziness   [5]   Current Outpatient Medications on File Prior to Visit   Medication Sig Dispense Refill    albuterol (Proventil HFA) 90 mcg/actuation inhaler Inhale 2 puffs every 6 hours if needed for wheezing.      amLODIPine (Norvasc) 10 mg tablet Take 1 tablet (10 mg) by mouth once daily.      aspirin (Adult Low Dose Aspirin) 81 mg EC tablet Take 1 tablet (81 mg) by mouth once daily.      atorvastatin (Lipitor) 40 mg tablet Take 1 tablet (40 mg) by mouth once daily. 90 tablet 3    capecitabine (Xeloda) 500 mg tablet Take 4 tablets (2,000 mg total) by mouth 2 times a day for 14 days on and 7 days off for a 21 day cycle.   Swallow whole with water. Do not crush or cut. 112 tablet 7    melatonin 10 mg tablet Take by mouth once daily.      metoprolol succinate XL (Toprol-XL) 50 mg 24 hr tablet Take 1 tablet (50 mg) by mouth once daily. 90 tablet 3    polyethylene glycol (Glycolax, Miralax) 17 gram packet Take 17 g by mouth once daily as needed.      umeclidinium (Incruse Ellipta) 62.5 mcg/actuation inhalation Inhale 1 puff (62.5 mcg) once daily.      [DISCONTINUED] ondansetron (Zofran) 8 mg tablet Take 1 tablet (8 mg) by mouth every 8 hours if needed for nausea or vomiting. (Patient not taking: Reported on 7/3/2025) 30 tablet 3    [DISCONTINUED] tirzepatide (Mounjaro) 5 mg/0.5 mL pen injector 5 mg by abdominal subcutaneous route 1 (one) time per week. (Patient not taking: Reported on 7/9/2025) 8 mL 3     No current facility-administered medications on file prior to visit.      Health Care Proxy (HCP)/Do Not Resuscitate (DNR)/Medical Orders for Life-Sustaining Treatment (MOLST)

## 2025-07-14 ENCOUNTER — ONCOLOGY MEDICATION OUTREACH (OUTPATIENT)
Dept: HEMATOLOGY/ONCOLOGY | Facility: HOSPITAL | Age: 75
End: 2025-07-14
Payer: COMMERCIAL

## 2025-07-14 DIAGNOSIS — C24.0: ICD-10-CM

## 2025-07-14 RX ORDER — CAPECITABINE 500 MG/1
825 TABLET, FILM COATED ORAL 2 TIMES DAILY
Qty: 84 TABLET | Refills: 1 | Status: SHIPPED | OUTPATIENT
Start: 2025-07-14

## 2025-07-14 NOTE — PROGRESS NOTES
[facility] Clinical Note  Patient Reassessment     Introduction  Elsa Castellano is a 74 y.o. female who is on the specialty pharmacy service for management of: Oncology Core.      [facility] supplied medication: capecitabine 500 mg tablets    Duration of therapy: Patient/Prescriber Specific- 8 cycles over 6 months     The most recent encounter visit with the referring prescriber Scottie Castro on 7/9/25 was reviewed.  Pharmacy will continue to collaborate in the care of this patient with the referring prescriber.    Discussion  Elsa was contacted on 7/14/2025 at 4:02 PM for a pharmacy visit with encounter number [id number] from:   [facility]  [address]  [address]  [facility] [address]  Dept: [phone]  Loc: [phone]  Elsa consented to a/an Telephone visit, which was performed.    Efficacy  Patient has developed new symptoms of condition: No  Patient/caregiver feels medication is affecting the disease state: prevent disease recurrence.  Taking 6 months of adjuvant capecitabine per BILCAP trial.     Goals  Provided education on goals and possible outcomes of therapy:  Adherence with therapy  Timely completion of appropriate labs  Timely and appropriate follow up with provider  Identify and address medication interactions with presciption medications, OTC medications and supplements  Optimize or maintain quality of life  Oncology: Curative with treatment  Manage side effects (ex: nausea/vomiting, constipation, fatigue) in conjunction with care team  Patient has documented target(s) for goals of therapy: Yes    Tolerance  Patient has experienced side effects from this medication: Yes - hand foot syndrome was red and tender along both hands and feet until June 2025 when Dr. Castro advised patient hold therapy additional time and then resume at reduced dose.  Patient using urea cream and wearing socks/shoes to reduce friction  and avoiding hot water.   Advised patient may also consider application of diclofenac 1% gel twice daily to soles of feet that are still affected in addition to urea cream.  Patient denies fatigue, nausea/vomiting, diarrhea, fever.     Changes to current therapy regimen: Yes - Reduced dose from 2000 mg BID to 1500 mg BID on June 18th per patient.  Rx was not updated by provider at the time.  I confirmed with patient I would send in new Rx for reduced dose for remaining 2 cycles that have not been filled by [facility].  Confirmed patient will have surplus of medication on hand when treatment is completed on September 2nd.  Patient may discard of extra capecitabine at medication drop box in front of [facility] after confirming treatment complete with provider after 8 cycles.     The follow-up timeline was discussed. Every person responds to and reacts to therapy differently. Patient should be assessed for efficacy and tolerability every 3 weeks with provider until completion of 8 cycles, then follow up with labs and reimaging scans accordingly.     Adherence  Patient Information  Informant: Self (Patient)  Demonstrates Understanding of Importance of Adherence: Yes  Does the patient have any barriers to self-administration (including physical and mental?): No  Support Network for Adherence: Family Member, Healthcare Provider  Adherence Tools Used: Calendar, Medication list  Medication Information  Medication: capecitabine (Xeloda)  Patient Reported Missed Doses in the Last 4 Weeks: 0  Estimated Medication Adherence Level: Good  Adherence Estimation Source: Claims history   The importance of adherence was discussed and patient/caregiver was advised to take the medication as prescribed by their provider. Encouraged patient/caregiver to call physician's office or specialty pharmacy if they have a question regarding a missed dose.    General Assessment  Changes to home medications, OTCs or supplements: No  Current  Medications[1]  Reported new allergies: No  Reported new medical conditions: No  Additional monitoring reviewed: Oncology - CBC-diff:   Lab Results   Component Value Date    WBC 6.7 07/03/2025    RBC 3.90 (L) 07/03/2025    HGB 13.3 07/03/2025    HCT 39.4 07/03/2025     (H) 07/03/2025    MCHC 33.8 07/03/2025     07/03/2025    RDW 19.1 (H) 07/03/2025    NEUTOPHILPCT 47.3 07/03/2025    IGPCT 0.3 07/03/2025    LYMPHOPCT 40.8 07/03/2025    MONOPCT 8.4 07/03/2025    EOSPCT 2.6 07/03/2025    BASOPCT 0.6 07/03/2025    NEUTROABS 3.15 07/03/2025    LYMPHSABS 2.72 07/03/2025    MONOSABS 0.56 07/03/2025    EOSABS 0.17 07/03/2025    BASOSABS 0.04 07/03/2025    and CMP:   Lab Results   Component Value Date    GLUCOSE 104 (H) 07/03/2025     07/03/2025    K 4.1 07/03/2025     (H) 07/03/2025    CO2 22 07/03/2025    ANIONGAP 13 07/03/2025    BUN 13 07/03/2025    CREATININE 0.80 07/03/2025    CALCIUM 9.2 07/03/2025    ALBUMIN 4.4 07/03/2025    ALKPHOS 48 07/03/2025    PROT 6.7 07/03/2025    AST 16 07/03/2025    BILITOT 0.8 07/03/2025    ALT 9 07/03/2025     Is laboratory follow up needed? Yes - every 3 weeks.  Last completed 7/9.     Advised to contact the pharmacy if there are any changes to the patient's medication list, including prescriptions, OTC medications, herbal products, or supplements.    Impression/Plan  This patient has not been identified as high risk due to Lack of high risk qualifiers.  The following action was taken: Adherence aid(s) provided.  Patient reports [facility] provides dosing calendar for each cycle and uses for adherence with no reported missed doses outside of when instructed to hold by MD.          Provided contact information ([phone]) for [facility] and reviewed dispensing process, refill timeline and patient management follow up. Confirmed understanding of education conducted during assessment. All questions and concerns were addressed and  patient/caregiver was encouraged to reach out for additional questions or concerns.    Based on the patient's diagnosis, medication list, progress towards goals, adherence, tolerance, and medication list, medication remains appropriate: Therapy remains appropriate (I attest)    Lois Mejia, ChrisD        [1]   Current Outpatient Medications   Medication Sig Dispense Refill    albuterol (Proventil HFA) 90 mcg/actuation inhaler Inhale 2 puffs every 6 hours if needed for wheezing.      amLODIPine (Norvasc) 10 mg tablet Take 1 tablet (10 mg) by mouth once daily.      aspirin (Adult Low Dose Aspirin) 81 mg EC tablet Take 1 tablet (81 mg) by mouth once daily.      atorvastatin (Lipitor) 40 mg tablet Take 1 tablet (40 mg) by mouth once daily. 90 tablet 3    capecitabine (Xeloda) 500 mg tablet Take 4 tablets (2,000 mg total) by mouth 2 times a day for 14 days on and 7 days off for a 21 day cycle.  Swallow whole with water. Do not crush or cut. 112 tablet 7    melatonin 10 mg tablet Take by mouth once daily.      metoprolol succinate XL (Toprol-XL) 50 mg 24 hr tablet Take 1 tablet (50 mg) by mouth once daily. 90 tablet 3    polyethylene glycol (Glycolax, Miralax) 17 gram packet Take 17 g by mouth once daily as needed.      umeclidinium (Incruse Ellipta) 62.5 mcg/actuation inhalation Inhale 1 puff (62.5 mcg) once daily.       No current facility-administered medications for this visit.

## 2025-07-15 ENCOUNTER — HOSPITAL ENCOUNTER (OUTPATIENT)
Dept: RADIOLOGY | Facility: CLINIC | Age: 75
Discharge: HOME | End: 2025-07-15
Payer: MEDICARE

## 2025-07-15 DIAGNOSIS — C22.1 CHOLANGIOCARCINOMA (MULTI): ICD-10-CM

## 2025-07-15 PROCEDURE — 2550000001 HC RX 255 CONTRASTS: Performed by: INTERNAL MEDICINE

## 2025-07-15 PROCEDURE — 71260 CT THORAX DX C+: CPT | Performed by: STUDENT IN AN ORGANIZED HEALTH CARE EDUCATION/TRAINING PROGRAM

## 2025-07-15 PROCEDURE — 74177 CT ABD & PELVIS W/CONTRAST: CPT

## 2025-07-15 PROCEDURE — 74177 CT ABD & PELVIS W/CONTRAST: CPT | Performed by: STUDENT IN AN ORGANIZED HEALTH CARE EDUCATION/TRAINING PROGRAM

## 2025-07-15 RX ADMIN — IOHEXOL 75 ML: 350 INJECTION, SOLUTION INTRAVENOUS at 09:38

## 2025-07-16 ENCOUNTER — APPOINTMENT (OUTPATIENT)
Dept: HEMATOLOGY/ONCOLOGY | Facility: HOSPITAL | Age: 75
End: 2025-07-16
Payer: COMMERCIAL

## 2025-07-17 ENCOUNTER — TELEMEDICINE (OUTPATIENT)
Dept: HEMATOLOGY/ONCOLOGY | Facility: HOSPITAL | Age: 75
End: 2025-07-17
Payer: MEDICARE

## 2025-07-17 DIAGNOSIS — C22.1 CHOLANGIOCARCINOMA (MULTI): Primary | ICD-10-CM

## 2025-07-17 PROCEDURE — 99213 OFFICE O/P EST LOW 20 MIN: CPT | Performed by: INTERNAL MEDICINE

## 2025-07-17 NOTE — PROGRESS NOTES
"Patient ID: Elsa Castellano is a 74 y.o. female.  This was a phone appointment.  Consent obtained.  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    She is on adjuvant capecitabine which she started in 03/2025.  Currently she is taking capecitabine 1500 mg p.o. twice daily.  This is a reduced dose after she had severe hand-foot syndrome.  She has been tolerating this dose well.  She will start the next cycle on July 30, 2025.    Her CA 19-9 is slightly elevated.  I ordered a scan which was reviewed in the GI oncology tumor board on July 16, 2025.  Her scan did not show any evidence of recurrent or progressive cancer.  The radiologist suggested to obtain a PET/CT scan when she is due for the next surveillance scan which I believe is reasonable.  I plan to continue surveillance along with adjuvant chemotherapy with capecitabine as planned.      Plan: Adjuvant capecitabine for 6 months (started on March 11, 2025).  Next scan in October 2025 which will be a PET/CT scan.    Follow up: In person follow-up at [facility] on July 28, 2025.  No labs needed.      I have placed all orders as outlined above. I advised the patient to schedule the tests and follow-up appointment as discussed by contacting the  on the way out or calling by phone.    Providers:  Surgeon: Dr. Josephine Romeroc:  RadOnc:    Chief complaint: Liver lesion    HPI: 73 year old female with a history of pre-DM, HLD, HTN, CAD s/p MI, COPD, congenital single kidney, who presented as a new patient with adenocarcinoma of the liver.    Patient notes that in 2022 she had a \"small heart attack\" and a CT done on 8/28/22 demonstrated a lateral segment " "2 lesion measuring 2.6x2cm. A follow up MRI liver performed on 9/15/23 showed this lesion was 2.8cm and read as \"most compatible with adenoma\". A follow up MRI liver perfromed on 9/9/24 demonstrated this lesion had grown to 4.x2x2.29cm. This was biopsied on 10/2/24 showing moderately differentiated adenocarcinoma involving the liver.      47. Staging CT CAP with 4.3x3x3.7cm hepatic segment 2/3 mass and a 2.6 right adnexal cyst.    1/3/25: hepatectomy and portal lymphadenectomy - pT1aN0, negative margins, no perineural or LVI     February 14, 2024: CT scan of chest abdomen pelvis showed the following-  IMPRESSION:  Cholangiocarcinoma restaging scan. When compared to the prior  examination dated 10/30/2024, there has been interval postsurgical  changes of a partial hepatectomy. New soft tissue nodule in the left upper quadrant and upper abdomen anterior peritoneum, short-term follow-up scan in 4-6 weeks recommended this could represent truer ecurrence versus evolving postoperative changes. In additionc orrelation with serum tumor markers can also be considered. New phlegmonous tissue and fluid in the left upper quadrant likely related to recent surgery. Additional stable chronic and incidental findings described above.    3/11/25: started C1 capecitabine (2 gm bid- 2 weeks on and 1 week off).   C2 to start on 4/1/2025.    April 14, 2025: CT scan does not show any evidence of recurrent cancer.    July 16, 2025: Her CT scan has been reviewed at the GI oncology tumor board.  No evidence of recurrent or progressive cancer was found.    Interval history:  Patient reports a little bit of soreness in her feet which she is managing well.  Denies soreness in mouth or redness and pain in her hands. Appetite and energy are good. She denies any fevers, chills, chest pain, dyspnea, cough, rash, mouth sores, nausea, vomiting or diarrhea.    Past Medical History:   Past Medical History:  08/28/2022: Acute non-ST segment " elevation myocardial infarction   (Multi)  No date: AMI (acute myocardial infarction) (Multi)  12/26/2023: Anxiety  No date: At risk for falling  09/08/2023: Chest pain  09/08/2023: Chronic obstructive pulmonary disease (Multi)  09/08/2023: Elevated liver enzymes  No date: H/O abdominal surgery  No date: Hepatic adenocarcinoma (Multi)  08/14/2023: Hepatomegaly  11/05/2024: Hiatal hernia  12/30/2023: History of hypertension  09/08/2023: Hyperlipidemia  No date: Hypertension  09/08/2023: Macrocytosis without anemia  No date: Obesity  09/08/2023: Palpitations  01/27/2023: Prediabetes  12/30/2023: Rhinitis  09/08/2023: Shortness of breath  No date: Weakness   Surgical History:    Past Surgical History:   Procedure Laterality Date    APPENDECTOMY  1979    CARDIAC CATHETERIZATION  2022    x2    CATARACT EXTRACTION Bilateral 2014    COLONOSCOPY  2008    COLONOSCOPY  2013    COLONOSCOPY  2018    DILATION AND CURETTAGE OF UTERUS  2012    OTHER SURGICAL HISTORY  1979    repositioning of intestine    VAGINA SURGERY  1974      Family History:    Family History   Problem Relation Name Age of Onset    No Known Problems Mother      Stroke Father Issac ShieldsJr/     Heart attack Father Issac ShieldsJr/     No Known Problems Brother      Breast cancer Father's Sister      Breast cancer Paternal Grandmother      Hypertension Other      Diabetes Other       Family Oncology History:    Cancer-related family history includes Breast cancer in her father's sister and paternal grandmother.  Social History:    Social History     Tobacco Use    Smoking status: Former     Types: Cigarettes     Passive exposure: Past    Smokeless tobacco: Never   Vaping Use    Vaping status: Never Used   Substance Use Topics    Alcohol use: Yes     Alcohol/week: 10.0 standard drinks of alcohol     Types: 10 Standard drinks or equivalent per week    Drug use: Never        Allergies  Allergies   Allergen Reactions    Penicillins Unknown, Anaphylaxis and  Swelling    Hydrochlorothiazide Dizziness        Medications  Current Outpatient Medications   Medication Instructions    albuterol (Proventil HFA) 90 mcg/actuation inhaler 2 puffs, Every 6 hours PRN    amLODIPine (NORVASC) 10 mg, Daily    aspirin (Adult Low Dose Aspirin) 81 mg EC tablet 1 tablet, Daily    atorvastatin (LIPITOR) 40 mg, oral, Daily    capecitabine (XELODA) 825 mg/m2, oral, 2 times daily, for 14 days on and 7 days off for a 21 day cycle.  Swallow whole with water. Do not crush or cut.    melatonin 10 mg tablet Daily    metoprolol succinate XL (TOPROL-XL) 50 mg, oral, Daily    polyethylene glycol (GLYCOLAX, MIRALAX) 17 g, Daily PRN    umeclidinium (Incruse Ellipta) 62.5 mcg/actuation inhalation 1 puff, Daily          Objective   VS: There were no vitals taken for this visit.  Weight:   There were no vitals filed for this visit.           PHYSICAL EXAMINATION  ECOG performance status- 1  VS:  There were no vitals taken for this visit.  Weight:   There were no vitals filed for this visit.          BSA: There is no height or weight on file to calculate BSA.   Pain Scale: 0      Labs  10/21/24:  47, CEA 1.7, AFP 5    Path  12/3/25  A. Peritoneum, Biopsy:   -- Benign fibroadipose tissue, negative for carcinoma.     B. Liver, Partial Hepatectomy:   -- Moderately differentiated adenocarcinoma, 5 cm, consistent with intrahepatic cholangiocarcinoma, small duct type, with adjacent hepatic parenchyma showing prior procedure related changes.  -- No lymphovascular or perineural invasion identified.  -- Hepatic parenchymal margin, negative for carcinoma.  -- Background liver with:  -- Steatohepatitis with moderate steatosis (40-50%), ballooned hepatocytes, and Jayna-Denk bodies.   -- Delicate zone 3 perisinusoidal fibrosis (trichrome/reticulin stains).  -- Moderate stainable iron in periportal hepatocytes (iron stain).  -- No evidence to support alpha-1-antitrypsin deficiency on PAS-D stain.  -- See note and  synoptic report.     C. Lymph Node, Hepatoportal, Excision:   -- Six lymph nodes, negative for carcinoma (0/6).     Note: Rare foci of dysplastic biliary epithelium are identified within the intraductal location, which may represent a precursor lesion / high-grade intraepithelial neoplasia, or cancerization of the duct.    Image  10/30/24 CT CAP   IMPRESSION:  Liver lesion of unknown primary:  1. When compared to the 2023 MRI and CT there has been slight interval increase in size of the left hepatic lobe lesion consistent with patient's biopsy-proven adenocarcinoma. No significant change when compared with the recent MRI dated 09/09/2024. No definite sites of metastatic disease elsewhere.  2. Diffuse hepatic steatosis, correlate with liver function tests.  3. Cystic lesion of the right adnexa measuring 2.6 cm, further evaluation with a pelvic ultrasound is recommended.  4. Additional stable chronic and incidental findings described above.     9/9/24 MRI Liver  IMPRESSION:  1. Diffuse hepatic steatosis with lateral segment 2 hepatic  progressively enhancing solid lesion measuring currently 4.2 x 2.9 cm. This lesion has significantly increased in size since CT scan dated 08/28/2022 as it was measuring 2.6 x 2.0 cm. The growth, patient's age/demographic coupled with the absence of classic signal intensity of benign hepatic lesions including hemangioma would raise  the concern for malignant process including mass forming  cholangiocarcinoma or alternatively metastatic lesions.  2. 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 No duct dilatation. Please see below guidelines for suggested follow-up  3. Known intestinal malrotation without obstruction    This note was created using a voice recognition system ( the Dragon dictation system). Inaccuracies and misspellings are unintentional.   Total time spent 20 minutes.    Scottie Castro MD.

## 2025-07-21 ENCOUNTER — APPOINTMENT (OUTPATIENT)
Dept: HEMATOLOGY/ONCOLOGY | Facility: CLINIC | Age: 75
End: 2025-07-21
Payer: COMMERCIAL

## 2025-07-28 ENCOUNTER — OFFICE VISIT (OUTPATIENT)
Dept: HEMATOLOGY/ONCOLOGY | Facility: CLINIC | Age: 75
End: 2025-07-28
Payer: MEDICARE

## 2025-07-28 VITALS
SYSTOLIC BLOOD PRESSURE: 129 MMHG | RESPIRATION RATE: 18 BRPM | HEART RATE: 60 BPM | BODY MASS INDEX: 28.12 KG/M2 | WEIGHT: 179.56 LBS | OXYGEN SATURATION: 99 % | DIASTOLIC BLOOD PRESSURE: 77 MMHG | TEMPERATURE: 97 F

## 2025-07-28 DIAGNOSIS — C22.1 CHOLANGIOCARCINOMA (MULTI): Primary | ICD-10-CM

## 2025-07-28 PROCEDURE — 1125F AMNT PAIN NOTED PAIN PRSNT: CPT | Performed by: INTERNAL MEDICINE

## 2025-07-28 PROCEDURE — 99214 OFFICE O/P EST MOD 30 MIN: CPT | Performed by: INTERNAL MEDICINE

## 2025-07-28 ASSESSMENT — PAIN SCALES - GENERAL: PAINLEVEL_OUTOF10: 8

## 2025-07-28 NOTE — PROGRESS NOTES
Patient ambulated into clinic for follow up with Dr. Castro alone. Medications and allergies reviewed.     Bilateral hands and feet discolored. Denied pain in her hands but reports bilateral foot pain.   Per MD she should not start her cycle for Capecitabine on 7/30, hold until 8/4, if she is able to walk with out pain she can restart. If she still has pain she needs to wait 2-3 more days before restarting.      Denied nausea, vomiting and diarrhea.     Follow up on August 25th with labs prior to MD appt.

## 2025-07-28 NOTE — PROGRESS NOTES
Patient ID: Elsa Castellano is a 74 y.o. female.    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    She is on adjuvant capecitabine which she started in 03/2025.  Currently she is taking capecitabine 1500 mg p.o. twice daily.  This is a reduced dose after she had severe hand-foot syndrome.  She has been tolerating this dose well.  She will start the next cycle on July 30, 2025.    Her CA 19-9 is slightly elevated.  I ordered a scan which was reviewed in the GI oncology tumor board on July 16, 2025.  Her scan did not show any evidence of recurrent or progressive cancer.  The radiologist suggested to obtain a PET/CT scan when she is due for the next surveillance scan which I believe is reasonable.  I plan to continue surveillance along with adjuvant chemotherapy with capecitabine as planned.      She was scheduled to start cycle 6 this week but she has moderate hand-foot syndrome particularly in her feet.  I advised her to push cycle 6 to August 6, 2025 if her symptoms of hand-foot syndrome subside.    Plan: Adjuvant capecitabine for 6 months (started on March 11, 2025).  Next scan in October 2025 which will be a PET/CT scan.    Follow up: August 25, 2025 with labs.        Next cycle to start on 8/6/2025 (cycle 6).    I have placed all orders as outlined above. I advised the patient to schedule the tests and follow-up appointment as discussed by contacting the  on the way out or calling by phone.    Providers:  Surgeon: Dr. Josephine Ham:  BrandonOnc:    Chief complaint: Liver lesion    HPI: 73 year old female with a history of pre-DM, HLD, HTN, CAD s/p MI, COPD, congenital single kidney, who  "presented as a new patient with adenocarcinoma of the liver.    Patient notes that in 2022 she had a \"small heart attack\" and a CT done on 8/28/22 demonstrated a lateral segment 2 lesion measuring 2.6x2cm. A follow up MRI liver performed on 9/15/23 showed this lesion was 2.8cm and read as \"most compatible with adenoma\". A follow up MRI liver perfromed on 9/9/24 demonstrated this lesion had grown to 4.x2x2.29cm. This was biopsied on 10/2/24 showing moderately differentiated adenocarcinoma involving the liver.      47. Staging CT CAP with 4.3x3x3.7cm hepatic segment 2/3 mass and a 2.6 right adnexal cyst.    1/3/25: hepatectomy and portal lymphadenectomy - pT1aN0, negative margins, no perineural or LVI     February 14, 2024: CT scan of chest abdomen pelvis showed the following-  IMPRESSION:  Cholangiocarcinoma restaging scan. When compared to the prior  examination dated 10/30/2024, there has been interval postsurgical  changes of a partial hepatectomy. New soft tissue nodule in the left upper quadrant and upper abdomen anterior peritoneum, short-term follow-up scan in 4-6 weeks recommended this could represent truer ecurrence versus evolving postoperative changes. In additionc orrelation with serum tumor markers can also be considered. New phlegmonous tissue and fluid in the left upper quadrant likely related to recent surgery. Additional stable chronic and incidental findings described above.    3/11/25: started C1 capecitabine (2 gm bid- 2 weeks on and 1 week off).   C2 to start on 4/1/2025.    April 14, 2025: CT scan does not show any evidence of recurrent cancer.    July 16, 2025: Her CT scan has been reviewed at the GI oncology tumor board.  No evidence of recurrent or progressive cancer was found.    Interval history:  Patient reports moderate soreness in her feet which makes her walking.   Denies soreness in mouth.  She has some redness but no pain in her hands. Appetite and energy are reasonable. She " denies any fevers, chills, chest pain, dyspnea, cough, rash, mouth sores, nausea, vomiting or diarrhea.    Past Medical History:   Past Medical History:  08/28/2022: Acute non-ST segment elevation myocardial infarction   (Multi)  No date: AMI (acute myocardial infarction) (Multi)  12/26/2023: Anxiety  No date: At risk for falling  09/08/2023: Chest pain  09/08/2023: Chronic obstructive pulmonary disease (Multi)  09/08/2023: Elevated liver enzymes  No date: H/O abdominal surgery  No date: Hepatic adenocarcinoma (Multi)  08/14/2023: Hepatomegaly  11/05/2024: Hiatal hernia  12/30/2023: History of hypertension  09/08/2023: Hyperlipidemia  No date: Hypertension  09/08/2023: Macrocytosis without anemia  No date: Obesity  09/08/2023: Palpitations  01/27/2023: Prediabetes  12/30/2023: Rhinitis  09/08/2023: Shortness of breath  No date: Weakness   Surgical History:    Past Surgical History:   Procedure Laterality Date    APPENDECTOMY  1979    CARDIAC CATHETERIZATION  2022    x2    CATARACT EXTRACTION Bilateral 2014    COLONOSCOPY  2008    COLONOSCOPY  2013    COLONOSCOPY  2018    DILATION AND CURETTAGE OF UTERUS  2012    OTHER SURGICAL HISTORY  1979    repositioning of intestine    VAGINA SURGERY  1974      Family History:    Family History   Problem Relation Name Age of Onset    No Known Problems Mother      Stroke Father Issac Shields Jr/     Heart attack Father Issac Shields Jr/     No Known Problems Brother      Breast cancer Father's Sister      Breast cancer Paternal Grandmother      Hypertension Other      Diabetes Other       Family Oncology History:    Cancer-related family history includes Breast cancer in her father's sister and paternal grandmother.  Social History:    Social History     Tobacco Use    Smoking status: Former     Types: Cigarettes     Passive exposure: Past    Smokeless tobacco: Never   Vaping Use    Vaping status: Never Used   Substance Use Topics    Alcohol use: Yes     Alcohol/week: 10.0  standard drinks of alcohol     Types: 10 Standard drinks or equivalent per week    Drug use: Never        Allergies  Allergies   Allergen Reactions    Penicillins Unknown, Anaphylaxis and Swelling    Hydrochlorothiazide Dizziness        Medications  Current Outpatient Medications   Medication Instructions    albuterol (Proventil HFA) 90 mcg/actuation inhaler 2 puffs, Every 6 hours PRN    amLODIPine (NORVASC) 10 mg, Daily    aspirin (Adult Low Dose Aspirin) 81 mg EC tablet 1 tablet, Daily    atorvastatin (LIPITOR) 40 mg, oral, Daily    capecitabine (XELODA) 825 mg/m2, oral, 2 times daily, for 14 days on and 7 days off for a 21 day cycle.  Swallow whole with water. Do not crush or cut.    melatonin 10 mg tablet Daily    metoprolol succinate XL (TOPROL-XL) 50 mg, oral, Daily    polyethylene glycol (GLYCOLAX, MIRALAX) 17 g, Daily PRN    umeclidinium (Incruse Ellipta) 62.5 mcg/actuation inhalation 1 puff, Daily          Objective   VS: /77 (BP Location: Right leg, Patient Position: Sitting, BP Cuff Size: Adult)   Pulse 60   Temp 36.1 °C (97 °F) (Temporal)   Resp 18   Wt 81.4 kg (179 lb 9 oz)   SpO2 99%   BMI 28.12 kg/m²   Weight:   Vitals:    07/28/25 1435   Weight: 81.4 kg (179 lb 9 oz)              PHYSICAL EXAMINATION  ECOG performance status- 1  VS:  /77 (BP Location: Right leg, Patient Position: Sitting, BP Cuff Size: Adult)   Pulse 60   Temp 36.1 °C (97 °F) (Temporal)   Resp 18   Wt 81.4 kg (179 lb 9 oz)   SpO2 99%   BMI 28.12 kg/m²   Weight:   Vitals:    07/28/25 1435   Weight: 81.4 kg (179 lb 9 oz)     ECOG PS- 1  Alert, ambulant, and answers questions appropriately.  Eyes- No pallor or icterus.  Neck- no swelling, lymph node enlargement or thyroid gland enlargement.  Chest- Bilateral good air entry. No crackles/ronchi.  Heart- no audible abnormal heart sounds.  Abdomen- Soft, non-tender. No mass or ascites.  Extremities- no swelling or pitting edema.  Hand-foot syndrome 1+          BSA:  1.96 meters squared   Pain Scale: 0      Labs  10/21/24:  47, CEA 1.7, AFP 5    Path  12/3/25  A. Peritoneum, Biopsy:   -- Benign fibroadipose tissue, negative for carcinoma.     B. Liver, Partial Hepatectomy:   -- Moderately differentiated adenocarcinoma, 5 cm, consistent with intrahepatic cholangiocarcinoma, small duct type, with adjacent hepatic parenchyma showing prior procedure related changes.  -- No lymphovascular or perineural invasion identified.  -- Hepatic parenchymal margin, negative for carcinoma.  -- Background liver with:  -- Steatohepatitis with moderate steatosis (40-50%), ballooned hepatocytes, and Jayna-Denk bodies.   -- Delicate zone 3 perisinusoidal fibrosis (trichrome/reticulin stains).  -- Moderate stainable iron in periportal hepatocytes (iron stain).  -- No evidence to support alpha-1-antitrypsin deficiency on PAS-D stain.  -- See note and synoptic report.     C. Lymph Node, Hepatoportal, Excision:   -- Six lymph nodes, negative for carcinoma (0/6).     Note: Rare foci of dysplastic biliary epithelium are identified within the intraductal location, which may represent a precursor lesion / high-grade intraepithelial neoplasia, or cancerization of the duct.    Image  10/30/24 CT CAP   IMPRESSION:  Liver lesion of unknown primary:  1. When compared to the 2023 MRI and CT there has been slight interval increase in size of the left hepatic lobe lesion consistent with patient's biopsy-proven adenocarcinoma. No significant change when compared with the recent MRI dated 09/09/2024. No definite sites of metastatic disease elsewhere.  2. Diffuse hepatic steatosis, correlate with liver function tests.  3. Cystic lesion of the right adnexa measuring 2.6 cm, further evaluation with a pelvic ultrasound is recommended.  4. Additional stable chronic and incidental findings described above.     9/9/24 MRI Liver  IMPRESSION:  1. Diffuse hepatic steatosis with lateral segment 2 hepatic  progressively  enhancing solid lesion measuring currently 4.2 x 2.9 cm. This lesion has significantly increased in size since CT scan dated 08/28/2022 as it was measuring 2.6 x 2.0 cm. The growth, patient's age/demographic coupled with the absence of classic signal intensity of benign hepatic lesions including hemangioma would raise  the concern for malignant process including mass forming  cholangiocarcinoma or alternatively metastatic lesions.  2. 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 No duct dilatation. Please see below guidelines for suggested follow-up  3. Known intestinal malrotation without obstruction    This note was created using a voice recognition system ( the Dragon dictation system). Inaccuracies and misspellings are unintentional.       Scottie Castro MD.

## 2025-08-05 ENCOUNTER — TELEPHONE (OUTPATIENT)
Dept: HEMATOLOGY/ONCOLOGY | Facility: CLINIC | Age: 75
End: 2025-08-05
Payer: COMMERCIAL

## 2025-08-05 NOTE — TELEPHONE ENCOUNTER
Pt still complaining of burning feeling in hands and feet   Pt questioning if she is to restart capecitabine and if so at what dose?  Dr. Castro pt

## 2025-08-06 ENCOUNTER — SPECIALTY PHARMACY (OUTPATIENT)
Dept: PHARMACY | Facility: CLINIC | Age: 75
End: 2025-08-06

## 2025-08-06 PROCEDURE — RXMED WILLOW AMBULATORY MEDICATION CHARGE

## 2025-08-07 ENCOUNTER — TELEPHONE (OUTPATIENT)
Dept: HEMATOLOGY/ONCOLOGY | Facility: CLINIC | Age: 75
End: 2025-08-07
Payer: COMMERCIAL

## 2025-08-07 NOTE — TELEPHONE ENCOUNTER
Called and spoke to Adwoa, she stated her hands and feet have improved since her visit with Dr. Castro on 7/28. Stated she started her Xeloda on 8/6. I informed her I would be mailing out a chemo calendar to her today. She appreciated my call, and will follow up on 8/25 with MD. Informed her to call the office if she needed anything before her appointment. No barriers to education noted, patient agreed to plan and verbalized understanding using the teach back method.

## 2025-08-07 NOTE — TELEPHONE ENCOUNTER
Called and left voicemail for Adwoa to return my call. I was calling patient to check in to see how her feet are feeling after being off her Xeloda.

## 2025-08-08 ENCOUNTER — PHARMACY VISIT (OUTPATIENT)
Dept: PHARMACY | Facility: CLINIC | Age: 75
End: 2025-08-08
Payer: COMMERCIAL

## 2025-08-13 ENCOUNTER — APPOINTMENT (OUTPATIENT)
Dept: RADIOLOGY | Facility: CLINIC | Age: 75
End: 2025-08-13
Payer: COMMERCIAL

## 2025-08-25 ENCOUNTER — LAB (OUTPATIENT)
Dept: LAB | Facility: CLINIC | Age: 75
End: 2025-08-25
Payer: MEDICARE

## 2025-08-25 ENCOUNTER — OFFICE VISIT (OUTPATIENT)
Dept: HEMATOLOGY/ONCOLOGY | Facility: CLINIC | Age: 75
End: 2025-08-25
Payer: MEDICARE

## 2025-08-25 VITALS
BODY MASS INDEX: 28.54 KG/M2 | RESPIRATION RATE: 18 BRPM | HEART RATE: 56 BPM | SYSTOLIC BLOOD PRESSURE: 159 MMHG | TEMPERATURE: 96.8 F | WEIGHT: 182.21 LBS | DIASTOLIC BLOOD PRESSURE: 73 MMHG

## 2025-08-25 DIAGNOSIS — C22.1 CHOLANGIOCARCINOMA (MULTI): Primary | ICD-10-CM

## 2025-08-25 DIAGNOSIS — C22.1 CHOLANGIOCARCINOMA (MULTI): ICD-10-CM

## 2025-08-25 LAB
ALBUMIN SERPL BCP-MCNC: 4.7 G/DL (ref 3.4–5)
ALP SERPL-CCNC: 55 U/L (ref 33–136)
ALT SERPL W P-5'-P-CCNC: 11 U/L (ref 7–45)
ANION GAP SERPL CALC-SCNC: 15 MMOL/L (ref 10–20)
AST SERPL W P-5'-P-CCNC: 22 U/L (ref 9–39)
BASOPHILS # BLD AUTO: 0.04 X10*3/UL (ref 0–0.1)
BASOPHILS NFR BLD AUTO: 0.4 %
BILIRUB SERPL-MCNC: 0.6 MG/DL (ref 0–1.2)
BUN SERPL-MCNC: 22 MG/DL (ref 6–23)
CALCIUM SERPL-MCNC: 9.4 MG/DL (ref 8.6–10.3)
CHLORIDE SERPL-SCNC: 108 MMOL/L (ref 98–107)
CO2 SERPL-SCNC: 19 MMOL/L (ref 21–32)
CREAT SERPL-MCNC: 0.91 MG/DL (ref 0.5–1.05)
EGFRCR SERPLBLD CKD-EPI 2021: 66 ML/MIN/1.73M*2
EOSINOPHIL # BLD AUTO: 0.23 X10*3/UL (ref 0–0.4)
EOSINOPHIL NFR BLD AUTO: 2.5 %
ERYTHROCYTE [DISTWIDTH] IN BLOOD BY AUTOMATED COUNT: 17.1 % (ref 11.5–14.5)
GLUCOSE SERPL-MCNC: 81 MG/DL (ref 74–99)
HCT VFR BLD AUTO: 42.9 % (ref 36–46)
HGB BLD-MCNC: 14 G/DL (ref 12–16)
IMM GRANULOCYTES # BLD AUTO: 0.03 X10*3/UL (ref 0–0.5)
IMM GRANULOCYTES NFR BLD AUTO: 0.3 % (ref 0–0.9)
LYMPHOCYTES # BLD AUTO: 3.1 X10*3/UL (ref 0.8–3)
LYMPHOCYTES NFR BLD AUTO: 34.1 %
MCH RBC QN AUTO: 35.1 PG (ref 26–34)
MCHC RBC AUTO-ENTMCNC: 32.6 G/DL (ref 32–36)
MCV RBC AUTO: 108 FL (ref 80–100)
MONOCYTES # BLD AUTO: 0.75 X10*3/UL (ref 0.05–0.8)
MONOCYTES NFR BLD AUTO: 8.3 %
NEUTROPHILS # BLD AUTO: 4.94 X10*3/UL (ref 1.6–5.5)
NEUTROPHILS NFR BLD AUTO: 54.4 %
NRBC BLD-RTO: 0 /100 WBCS (ref 0–0)
PLATELET # BLD AUTO: 162 X10*3/UL (ref 150–450)
POTASSIUM SERPL-SCNC: 4.2 MMOL/L (ref 3.5–5.3)
PROT SERPL-MCNC: 7.2 G/DL (ref 6.4–8.2)
RBC # BLD AUTO: 3.99 X10*6/UL (ref 4–5.2)
SODIUM SERPL-SCNC: 138 MMOL/L (ref 136–145)
WBC # BLD AUTO: 9.1 X10*3/UL (ref 4.4–11.3)

## 2025-08-25 PROCEDURE — 85025 COMPLETE CBC W/AUTO DIFF WBC: CPT

## 2025-08-25 PROCEDURE — 80053 COMPREHEN METABOLIC PANEL: CPT

## 2025-08-25 PROCEDURE — 36415 COLL VENOUS BLD VENIPUNCTURE: CPT

## 2025-08-25 PROCEDURE — 1126F AMNT PAIN NOTED NONE PRSNT: CPT | Performed by: INTERNAL MEDICINE

## 2025-08-25 PROCEDURE — 1159F MED LIST DOCD IN RCRD: CPT | Performed by: INTERNAL MEDICINE

## 2025-08-25 PROCEDURE — 99214 OFFICE O/P EST MOD 30 MIN: CPT | Performed by: INTERNAL MEDICINE

## 2025-08-25 ASSESSMENT — PAIN SCALES - GENERAL: PAINLEVEL_OUTOF10: 0-NO PAIN

## 2025-08-26 ENCOUNTER — DOCUMENTATION (OUTPATIENT)
Dept: HEMATOLOGY/ONCOLOGY | Facility: CLINIC | Age: 75
End: 2025-08-26
Payer: COMMERCIAL

## (undated) DEVICE — TISSEEL FIBRIN SEALANT, PRIMA, FROZEN, 10ML

## (undated) DEVICE — DEVICE, GELPOINT MINI

## (undated) DEVICE — SEAL, UNIVERSAL, 5-12MM

## (undated) DEVICE — DRAPE, COLUMN, DAVINCI XI

## (undated) DEVICE — LIGASURE, MARYLAND JAW, OPEN DELIVERY

## (undated) DEVICE — COVER, TIP HOT SHEARS ENDOWRIST

## (undated) DEVICE — Device

## (undated) DEVICE — SUTURE, VICRYL, 2-0, 27 IN, BR/SH 27, VIOLET

## (undated) DEVICE — ACCESS PORT, 12MM, 100MM LENGTH, LOW PROFILE W/BLADELESS OPTICAL TIP

## (undated) DEVICE — MANIFOLD, 4 PORT NEPTUNE STANDARD

## (undated) DEVICE — OBTURATOR, BLADELESS , SU

## (undated) DEVICE — SUTURE, VICRYL, 3-0, 27 IN, SH

## (undated) DEVICE — KIT, ROBOTIC, CUSTOM UHC

## (undated) DEVICE — SUTURE, ETHIBOND EXCEL 1, TAPER POINT CT-1 GREEN 30 INCH

## (undated) DEVICE — ADHESIVE, SKIN, DERMABOND ADVANCED, 15CM, PEN-STYLE

## (undated) DEVICE — COVER, CART, 45 X 27 X 48 IN, CLEAR

## (undated) DEVICE — DRAPE, SHEET, ENDOSCOPY, GENERAL, FENESTRATED, ARMBOARD COVER, 98 X 123.5 IN, DISPOSABLE, LF, STERILE

## (undated) DEVICE — DRAPE, ARM XI

## (undated) DEVICE — SYNCHROSEAL, IS 4000, 8 MM

## (undated) DEVICE — SUTURE, MONOCRYL, 4-0, 18 IN, PS2, UNDYED

## (undated) DEVICE — CATHETER TRAY, SURESTEP, 16FR, URINE METER W/STATLOCK

## (undated) DEVICE — SUTURE, PDS II, 1, 36 IN, CT-1, VIOLET

## (undated) DEVICE — RETRIEVAL SYSTEM, MONARCH, 10MM DISP ENDOSCOPIC

## (undated) DEVICE — TUBING SET, TRI-LUMEN, FILTERED, F/AIRSEAL

## (undated) DEVICE — DRAPE, SHEET, UTILITY, NON ABSORBENT, 18 X 26 IN, LF